# Patient Record
Sex: FEMALE | Race: WHITE | HISPANIC OR LATINO | Employment: OTHER | URBAN - METROPOLITAN AREA
[De-identification: names, ages, dates, MRNs, and addresses within clinical notes are randomized per-mention and may not be internally consistent; named-entity substitution may affect disease eponyms.]

---

## 2017-11-03 ENCOUNTER — TRANSFERRED RECORDS (OUTPATIENT)
Dept: HEALTH INFORMATION MANAGEMENT | Facility: CLINIC | Age: 72
End: 2017-11-03

## 2017-11-07 ENCOUNTER — TRANSFERRED RECORDS (OUTPATIENT)
Dept: HEALTH INFORMATION MANAGEMENT | Facility: CLINIC | Age: 72
End: 2017-11-07

## 2017-11-30 ENCOUNTER — TRANSFERRED RECORDS (OUTPATIENT)
Dept: HEALTH INFORMATION MANAGEMENT | Facility: CLINIC | Age: 72
End: 2017-11-30

## 2017-12-18 ENCOUNTER — TRANSFERRED RECORDS (OUTPATIENT)
Dept: HEALTH INFORMATION MANAGEMENT | Facility: CLINIC | Age: 72
End: 2017-12-18

## 2017-12-18 LAB — PHQ9 SCORE: 1

## 2017-12-21 ENCOUNTER — TRANSFERRED RECORDS (OUTPATIENT)
Dept: HEALTH INFORMATION MANAGEMENT | Facility: CLINIC | Age: 72
End: 2017-12-21

## 2018-02-08 ENCOUNTER — TRANSFERRED RECORDS (OUTPATIENT)
Dept: HEALTH INFORMATION MANAGEMENT | Facility: CLINIC | Age: 73
End: 2018-02-08

## 2018-03-12 ENCOUNTER — TRANSFERRED RECORDS (OUTPATIENT)
Dept: HEALTH INFORMATION MANAGEMENT | Facility: CLINIC | Age: 73
End: 2018-03-12

## 2018-04-03 LAB
ALT SERPL-CCNC: 10.85 U/L (ref 0–40)
AST SERPL-CCNC: 13.66 U/L (ref 0–45)
CREAT SERPL-MCNC: 0.83 MG/DL (ref 0.5–1.4)
GFR SERPL CREATININE-BSD FRML MDRD: >60 ML/MIN/1.73
GLUCOSE SERPL-MCNC: 108 MG/DL (ref 70–115)
POTASSIUM SERPL-SCNC: 4.75 MMOL/L (ref 3.5–5.3)

## 2018-08-01 PROCEDURE — 00000346 ZZHCL STATISTIC REVIEW OUTSIDE SLIDES TC 88321: Performed by: INTERNAL MEDICINE

## 2018-08-02 ENCOUNTER — CARE COORDINATION (OUTPATIENT)
Dept: ONCOLOGY | Facility: CLINIC | Age: 73
End: 2018-08-02
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
## 2018-08-02 NOTE — PROGRESS NOTES
Contacted patient, spoke with sister, who she lives with regarding her consult for tomorrow with Dr. Bee.  Patient, Katarzyna received her chemotherapy in [place] but was diagnosed through [facility].  She is going to hand carry her records from [place] and writer confirmed that they would be in English.  Per sister, the doctor in [place] is referring her to radiation for post-lumpectomy treatment.  Radiation oncology was messaged but they would not be able to see her tomorrow after appointment with Dr Bee - would need to schedule separate day as it would be too late in the day to get planning for radiation done.

## 2018-08-03 ENCOUNTER — ONCOLOGY VISIT (OUTPATIENT)
Dept: ONCOLOGY | Facility: CLINIC | Age: 73
End: 2018-08-03
Payer: COMMERCIAL

## 2018-08-03 VITALS
BODY MASS INDEX: 20.24 KG/M2 | DIASTOLIC BLOOD PRESSURE: 80 MMHG | RESPIRATION RATE: 16 BRPM | WEIGHT: 121.5 LBS | TEMPERATURE: 98 F | OXYGEN SATURATION: 97 % | HEIGHT: 65 IN | HEART RATE: 77 BPM | SYSTOLIC BLOOD PRESSURE: 140 MMHG

## 2018-08-03 DIAGNOSIS — M81.8 OTHER OSTEOPOROSIS WITHOUT CURRENT PATHOLOGICAL FRACTURE: ICD-10-CM

## 2018-08-03 DIAGNOSIS — C50.912 INVASIVE DUCTAL CARCINOMA OF LEFT BREAST (H): Primary | ICD-10-CM

## 2018-08-03 DIAGNOSIS — Z79.811 AROMATASE INHIBITOR USE: ICD-10-CM

## 2018-08-03 DIAGNOSIS — Z78.0 POSTMENOPAUSAL STATUS: ICD-10-CM

## 2018-08-03 LAB
ALBUMIN SERPL-MCNC: 3.9 G/DL (ref 3.4–5)
ALP SERPL-CCNC: 92 U/L (ref 40–150)
ALT SERPL W P-5'-P-CCNC: 24 U/L (ref 0–50)
ANION GAP SERPL CALCULATED.3IONS-SCNC: 6 MMOL/L (ref 3–14)
AST SERPL W P-5'-P-CCNC: 18 U/L (ref 0–45)
BASOPHILS # BLD AUTO: 0 10E9/L (ref 0–0.2)
BASOPHILS NFR BLD AUTO: 0.4 %
BILIRUB SERPL-MCNC: 0.5 MG/DL (ref 0.2–1.3)
BUN SERPL-MCNC: 12 MG/DL (ref 7–30)
CALCIUM SERPL-MCNC: 9.6 MG/DL (ref 8.5–10.1)
CHLORIDE SERPL-SCNC: 109 MMOL/L (ref 94–109)
CO2 SERPL-SCNC: 28 MMOL/L (ref 20–32)
CREAT SERPL-MCNC: 0.77 MG/DL (ref 0.52–1.04)
DIFFERENTIAL METHOD BLD: NORMAL
EOSINOPHIL # BLD AUTO: 0.1 10E9/L (ref 0–0.7)
EOSINOPHIL NFR BLD AUTO: 1.4 %
ERYTHROCYTE [DISTWIDTH] IN BLOOD BY AUTOMATED COUNT: 12.8 % (ref 10–15)
GFR SERPL CREATININE-BSD FRML MDRD: 73 ML/MIN/1.7M2
GLUCOSE SERPL-MCNC: 102 MG/DL (ref 70–99)
HCT VFR BLD AUTO: 38.2 % (ref 35–47)
HGB BLD-MCNC: 12.7 G/DL (ref 11.7–15.7)
IMM GRANULOCYTES # BLD: 0 10E9/L (ref 0–0.4)
IMM GRANULOCYTES NFR BLD: 0.4 %
LYMPHOCYTES # BLD AUTO: 0.8 10E9/L (ref 0.8–5.3)
LYMPHOCYTES NFR BLD AUTO: 11.7 %
MCH RBC QN AUTO: 30.8 PG (ref 26.5–33)
MCHC RBC AUTO-ENTMCNC: 33.2 G/DL (ref 31.5–36.5)
MCV RBC AUTO: 93 FL (ref 78–100)
MONOCYTES # BLD AUTO: 0.7 10E9/L (ref 0–1.3)
MONOCYTES NFR BLD AUTO: 10.2 %
NEUTROPHILS # BLD AUTO: 5.5 10E9/L (ref 1.6–8.3)
NEUTROPHILS NFR BLD AUTO: 75.9 %
PLATELET # BLD AUTO: 183 10E9/L (ref 150–450)
POTASSIUM SERPL-SCNC: 3.9 MMOL/L (ref 3.4–5.3)
PROT SERPL-MCNC: 7.3 G/DL (ref 6.8–8.8)
RBC # BLD AUTO: 4.12 10E12/L (ref 3.8–5.2)
SODIUM SERPL-SCNC: 143 MMOL/L (ref 133–144)
WBC # BLD AUTO: 7.2 10E9/L (ref 4–11)

## 2018-08-03 PROCEDURE — 80053 COMPREHEN METABOLIC PANEL: CPT | Performed by: INTERNAL MEDICINE

## 2018-08-03 PROCEDURE — 99205 OFFICE O/P NEW HI 60 MIN: CPT | Performed by: INTERNAL MEDICINE

## 2018-08-03 PROCEDURE — 85025 COMPLETE CBC W/AUTO DIFF WBC: CPT | Performed by: INTERNAL MEDICINE

## 2018-08-03 PROCEDURE — 36415 COLL VENOUS BLD VENIPUNCTURE: CPT | Performed by: INTERNAL MEDICINE

## 2018-08-03 RX ORDER — EPINEPHRINE 0.3 MG/.3ML
0.3 INJECTION SUBCUTANEOUS EVERY 5 MIN PRN
Status: CANCELLED | OUTPATIENT
Start: 2018-08-10

## 2018-08-03 RX ORDER — ALBUTEROL SULFATE 0.83 MG/ML
2.5 SOLUTION RESPIRATORY (INHALATION)
Status: CANCELLED | OUTPATIENT
Start: 2018-08-10

## 2018-08-03 RX ORDER — MEPERIDINE HYDROCHLORIDE 25 MG/ML
25 INJECTION INTRAMUSCULAR; INTRAVENOUS; SUBCUTANEOUS EVERY 30 MIN PRN
Status: CANCELLED | OUTPATIENT
Start: 2018-08-10

## 2018-08-03 RX ORDER — DIPHENHYDRAMINE HYDROCHLORIDE 50 MG/ML
50 INJECTION INTRAMUSCULAR; INTRAVENOUS
Status: CANCELLED
Start: 2018-08-10

## 2018-08-03 RX ORDER — ALBUTEROL SULFATE 90 UG/1
1-2 AEROSOL, METERED RESPIRATORY (INHALATION)
Status: CANCELLED
Start: 2018-08-10

## 2018-08-03 RX ORDER — METHYLPREDNISOLONE SODIUM SUCCINATE 125 MG/2ML
125 INJECTION, POWDER, LYOPHILIZED, FOR SOLUTION INTRAMUSCULAR; INTRAVENOUS
Status: CANCELLED
Start: 2018-08-10

## 2018-08-03 RX ORDER — SODIUM CHLORIDE 9 MG/ML
1000 INJECTION, SOLUTION INTRAVENOUS CONTINUOUS PRN
Status: CANCELLED
Start: 2018-08-10

## 2018-08-03 RX ORDER — FLUOXETINE 10 MG/1
10 CAPSULE ORAL DAILY
COMMUNITY
Start: 2018-02-16 | End: 2020-03-26

## 2018-08-03 RX ORDER — ANASTROZOLE 1 MG/1
1 TABLET ORAL DAILY
Qty: 90 TABLET | Refills: 3 | Status: SHIPPED | OUTPATIENT
Start: 2018-08-03 | End: 2019-02-15

## 2018-08-03 RX ORDER — EPINEPHRINE 1 MG/ML
0.3 INJECTION, SOLUTION INTRAMUSCULAR; SUBCUTANEOUS EVERY 5 MIN PRN
Status: CANCELLED | OUTPATIENT
Start: 2018-08-10

## 2018-08-03 ASSESSMENT — PAIN SCALES - GENERAL: PAINLEVEL: NO PAIN (0)

## 2018-08-03 NOTE — MR AVS SNAPSHOT
After Visit Summary   8/3/2018    Radha Patel    MRN: [id number]           Patient Information     Date Of Birth          1945        Visit Information        Provider Department      8/3/2018 1:00 PM Chastity Bee MD [facility]        Today's Diagnoses     Invasive ductal carcinoma of left breast (H)    -  1    Other osteoporosis without current pathological fracture        Aromatase inhibitor use        Postmenopausal status           Follow-ups after your visit        Your next 10 appointments already scheduled     Aug 10, 2018 12:30 PM CDT   Level O with [address])    [address] [place] [id number] [phone]            Aug 10, 2018  1:00 PM CDT   New Visit with Melvin Jones MD   [facility])    [address] [place] [id number] [phone]            Aug 10, 2018  2:30 PM CDT   Ct Sim with Melvin Jones MD, MG RT SIMULATION   [facility])    [address] [place] [id number] [phone]            Oct 05, 2018  3:30 PM CDT   Return Visit with Chastity Bee MD   [facility])    [address] [place] [id number]   [phone]              Who to contact     If you have questions or need follow up information about today's clinic visit or your schedule please contact [facility] directly at [phone].  Normal or non-critical lab and imaging results will be communicated to you by MyChart, letter or phone within 4 business days after the clinic has received the results. If you do not hear from us within 7 days, please contact the clinic through MyChart or phone. If you have a critical or abnormal lab result, we will notify you by phone as soon as possible.  Submit  "refill requests through [employer] or call your pharmacy and they will forward the refill request to us. Please allow 3 business days for your refill to be completed.          Additional Information About Your Visit        [employer] Information     [employer] is an electronic gateway that provides easy, online access to your medical records. With [employer], you can request a clinic appointment, read your test results, renew a prescription or communicate with your care team.     To sign up for [employer] visit the website at [email or web address]   You will be asked to enter the access code listed below, as well as some personal information. Please follow the directions to create your username and password.     Your access code is: [email or web address]  Expires: 2018 12:32 PM     Your access code will  in 90 days. If you need help or a new code, please contact your [facility] or call [phone] for assistance.        Care EveryWhere ID     This is your Care EveryWhere ID. This could be used by other organizations to access your [facility] medical records  [id number]        Your Vitals Were     Pulse Temperature Respirations Height Pulse Oximetry BMI (Body Mass Index)    77 98  F (36.7  C) 16 1.655 m (5' 5.16\") 97% 20.12 kg/m2       Blood Pressure from Last 3 Encounters:   18 140/80   12 148/61    Weight from Last 3 Encounters:   18 55.1 kg (121 lb 8 oz)   12 53.1 kg (117 lb 1 oz)              We Performed the Following     CBC with platelets differential     Comprehensive metabolic panel          Today's Medication Changes          These changes are accurate as of 8/3/18  3:17 PM.  If you have any questions, ask your nurse or doctor.               Start taking these medicines.        Dose/Directions    anastrozole 1 MG tablet   Commonly known as:  ARIMIDEX   Used for:  Invasive ductal carcinoma of left breast (H), Other osteoporosis without current pathological " fracture, Aromatase inhibitor use   Started by:  Chastity Bee MD        Dose:  1 mg   Take 1 tablet (1 mg) by mouth daily   Quantity:  90 tablet   Refills:  3            Where to get your medicines      These medications were sent to [id number] - [place] - [address] AT [facility] [address] [place] [address]     Phone:  [phone]     anastrozole 1 MG tablet                Primary Care Provider Office Phone # Fax #    [facility] [phone] [address] [facility] [address]        Equal Access to Services     : Hadii zina morocho hadasho Sociaraali, waaxda luqadaha, qaybta kaalmada adeegyada, waxcintia moody . So [facility] [phone].    ATENCIÓN: Si habla español, tiene a yoon disposición servicios gratuitos de asistencia lingüística. [facility] [phone].    We comply with applicable federal civil rights laws and [place] laws. We do not discriminate on the basis of race, color, national origin, age, disability, sex, sexual orientation, or gender identity.            Thank you!     Thank you for choosing [facility]  for your care. Our goal is always to provide you with excellent care. Hearing back from our patients is one way we can continue to improve our services. Please take a few minutes to complete the written survey that you may receive in the mail after your visit with us. Thank you!             Your Updated Medication List - Protect others around you: Learn how to safely use, store and throw away your medicines at www.disposemymeds.org.          This list is accurate as of 8/3/18  3:17 PM.  Always use your most recent med list.                   Brand Name Dispense Instructions for use Diagnosis    anastrozole 1 MG tablet    ARIMIDEX    90 tablet    Take 1 tablet (1 mg) by mouth daily    Invasive ductal carcinoma of left breast (H),  Other osteoporosis without current pathological fracture, Aromatase inhibitor use       ASPIRIN PO      Take 81 mg by mouth daily    Invasive ductal carcinoma of left breast (H), Other osteoporosis without current pathological fracture, Aromatase inhibitor use       ATENOLOL PO      Take 12.5 mg by mouth daily.        FLUoxetine 10 MG capsule    PROzac     Take 10 mg by mouth daily    Invasive ductal carcinoma of left breast (H), Other osteoporosis without current pathological fracture, Aromatase inhibitor use       hydrochlorothiazide 12.5 MG capsule    MICROZIDE     Take 25 mg by mouth daily.        HYDROcodone-acetaminophen 5-325 MG per tablet    NORCO    30 tablet    Take 1 tablet by mouth every 6 hours as needed for pain.    Caries       ibuprofen 600 MG tablet    IBU    30 tablet    Take 1 tablet by mouth every 6 hours as needed for pain.    Caries       LIPITOR PO      Take 40 mg by mouth daily.        LISINOPRIL PO      Take 20 mg by mouth daily.        METFORMIN HCL PO      Take 750 mg by mouth daily (with breakfast).        PLAVIX PO      Take 75 mg by mouth daily.
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
## 2018-08-03 NOTE — NURSING NOTE
"Oncology Rooming Note    August 3, 2018 1:11 PM   Katarzyna Patel is a 72 year old female who presents for:    Chief Complaint   Patient presents with     Oncology Clinic Visit     new breast ca - transfer of care      Initial Vitals: /72  Pulse 77  Temp 98  F (36.7  C)  Resp 16  Ht 1.655 m (5' 5.16\")  Wt 55.1 kg (121 lb 8 oz)  SpO2 97%  BMI 20.12 kg/m2 Estimated body mass index is 20.12 kg/(m^2) as calculated from the following:    Height as of this encounter: 1.655 m (5' 5.16\").    Weight as of this encounter: 55.1 kg (121 lb 8 oz). Body surface area is 1.59 meters squared.  No Pain (0) Comment: Data Unavailable   No LMP recorded. Patient has had a hysterectomy.  Allergies reviewed: Yes  Medications reviewed: Yes    Medications: MEDICATION REFILLS NEEDED TODAY. Provider was notified.  Pharmacy name entered into Stio:    [facility] - [place], [place] - [address] [facility] [address] [place] [address] [address] [facility]  [address]        5 minutes for nursing intake (face to face time)     Elizabeth Madden LPN              "
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
## 2018-08-10 ENCOUNTER — OFFICE VISIT (OUTPATIENT)
Dept: RADIATION ONCOLOGY | Facility: CLINIC | Age: 73
End: 2018-08-10
Payer: COMMERCIAL

## 2018-08-10 ENCOUNTER — INFUSION THERAPY VISIT (OUTPATIENT)
Dept: INFUSION THERAPY | Facility: CLINIC | Age: 73
End: 2018-08-10
Payer: COMMERCIAL

## 2018-08-10 ENCOUNTER — APPOINTMENT (OUTPATIENT)
Dept: RADIATION ONCOLOGY | Facility: CLINIC | Age: 73
End: 2018-08-10
Payer: COMMERCIAL

## 2018-08-10 VITALS
WEIGHT: 120.8 LBS | TEMPERATURE: 97.7 F | OXYGEN SATURATION: 94 % | DIASTOLIC BLOOD PRESSURE: 62 MMHG | RESPIRATION RATE: 16 BRPM | SYSTOLIC BLOOD PRESSURE: 133 MMHG | HEART RATE: 59 BPM | BODY MASS INDEX: 20.01 KG/M2

## 2018-08-10 VITALS
TEMPERATURE: 98.1 F | BODY MASS INDEX: 20.12 KG/M2 | WEIGHT: 120.8 LBS | RESPIRATION RATE: 18 BRPM | HEART RATE: 55 BPM | SYSTOLIC BLOOD PRESSURE: 145 MMHG | DIASTOLIC BLOOD PRESSURE: 57 MMHG | OXYGEN SATURATION: 96 % | HEIGHT: 65 IN

## 2018-08-10 DIAGNOSIS — C50.912 INVASIVE DUCTAL CARCINOMA OF LEFT BREAST (H): Primary | ICD-10-CM

## 2018-08-10 DIAGNOSIS — Z79.811 AROMATASE INHIBITOR USE: ICD-10-CM

## 2018-08-10 DIAGNOSIS — C50.912 INVASIVE DUCTAL CARCINOMA OF LEFT BREAST (H): ICD-10-CM

## 2018-08-10 DIAGNOSIS — M81.8 OTHER OSTEOPOROSIS WITHOUT CURRENT PATHOLOGICAL FRACTURE: Primary | ICD-10-CM

## 2018-08-10 PROCEDURE — 77263 THER RADIOLOGY TX PLNG CPLX: CPT | Performed by: RADIOLOGY

## 2018-08-10 PROCEDURE — 77290 THER RAD SIMULAJ FIELD CPLX: CPT | Performed by: RADIOLOGY

## 2018-08-10 PROCEDURE — 96372 THER/PROPH/DIAG INJ SC/IM: CPT | Performed by: INTERNAL MEDICINE

## 2018-08-10 PROCEDURE — 99205 OFFICE O/P NEW HI 60 MIN: CPT | Mod: 25 | Performed by: RADIOLOGY

## 2018-08-10 PROCEDURE — 99207 ZZC NO CHARGE NURSE ONLY: CPT

## 2018-08-10 RX ORDER — METOPROLOL TARTRATE 25 MG/1
25 TABLET, FILM COATED ORAL DAILY
COMMUNITY

## 2018-08-10 ASSESSMENT — PAIN SCALES - GENERAL
PAINLEVEL: NO PAIN (0)
PAINLEVEL: NO PAIN (0)

## 2018-08-10 NOTE — NURSING NOTE
INITIAL PATIENT ASSESSMENT      Diagnosis: Breast cancer (left breast)    Prior radiation therapy: None    Prior chemotherapy:   Protocol: CMF x 6 cycles  Facility: Dr. Debra Cuevas [facility]  Dates: Completed 6/8/2018      Prior hormonal therapy: Patient currently taking Arimidex.    Pain Eval:  Denies    Psychosocial  Living arrangements: Lives at home with family in [place], patient works as a  at [employer] three days per week.  Fall Risk: independent   referral needs: Not needed    Advanced Directive: Yes - Location: patient's family has copy  Implantable Cardiac Device: No  Authorization To Share Protected Health Information: Patient and sister report completed Authorization to Discuss form when in clinic for consultation with Dr. Bee, not currently scanned into medical chart, will continue to watch for scanning, otherwise will have patient complete new form.      Onset of menopause: Patient reports history of JELENA/BSO  Abnormal vaginal bleeding/discharge: No  Are you pregnant? No    Review of Systems     Constitutional: Negative.    HENT: Positive for hearing loss. Negative for congestion, ear discharge, ear pain, nosebleeds, sinus pain, sore throat and tinnitus.         Patient reports hearing loss, bilateral hearing aides.   Eyes: Negative.    Respiratory: Negative.  Negative for stridor.    Cardiovascular: Negative.    Gastrointestinal: Negative.    Genitourinary: Negative.    Musculoskeletal: Negative.    Skin: Negative.    Neurological: Positive for tingling, speech change and focal weakness. Negative for dizziness, tremors, sensory change, seizures, loss of consciousness and headaches.        Patient and sister report speech aphasia related to history of stroke, patient also reports slight right sided weakness.  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   Endo/Heme/Allergies: Negative.    Psychiatric/Behavioral: Negative.         Nurse face-to-face time: Level 5:  over 15 min face to face time

## 2018-08-10 NOTE — MR AVS SNAPSHOT
After Visit Summary   8/10/2018    Radha Patel    MRN: [id number]           Patient Information     Date Of Birth          1945        Visit Information        Provider Department      8/10/2018 1:00 PM Melvin Jones MD [facility]        Today's Diagnoses     Invasive ductal carcinoma of left breast (H)    -  1      Care Instructions          What to expect at your Simulation visit:    You will meet with a Radiation Therapist and other team members who will be doing a planning session called a  simulation  with you. This process will determine your daily treatment.    ~ You will lie on a flat table and have a treatment planning CT scan.  It is important during the scan to hold very still and breathe normally.    ~ Your therapist may construct a body mold to help you hold still for your treatments.    ~ If you are having treatment to the head or neck area you will be fitted with a plastic mesh mask that fits very snugly over your face and neck.     ~ Your therapist will be taking some digital photos that will go in your treatment chart.      ~Your therapist will make marks on your skin and take measurements. Your therapist may ask you about making small tattoos (a permanent small dot) over these marks.  These marks are used to position you daily for your radiation therapy treatments. Please do not wash off any marks until all of your radiation therapy treatments are complete unless you are instructed to do so by your therapist.    ~ Once the simulation is completed it can take from 3 to 10 business days before you start radiation therapy treatments.    ~ You may meet with a nurse who will go over management of treatment side effects and self care during your treatments. The nurse will help to plan care with other departments and physicians if needed.      Preventive Care:    Colorectal Cancer Screening: During our visit today, we discussed that it is recommended you  receive colorectal cancer screening. Please call or make an appointment with your primary care provider to discuss this. You may also call the [facility] scheduling line ([phone]) to set up a colonoscopy appointment.    Please contact [facility] RN with questions or concerns following today's appointment: [phone].    Thank you!              Follow-ups after your visit        Your next 10 appointments already scheduled     Aug 10, 2018  2:30 PM CDT   Ct Sim with Melvin Jones MD, MG RT SIMULATION   [facility] ([facility])    [address] [place] [address] [phone]            Oct 05, 2018  3:30 PM CDT   Return Visit with Chastity Bee MD   [facility])    [address] [address] [facility] [address] [phone]              Who to contact     If you have questions or need follow up information about today's clinic visit or your schedule please contact [facility] directly at [phone].  Normal or non-critical lab and imaging results will be communicated to you by Chumbakhart, letter or phone within 4 business days after the clinic has received the results. If you do not hear from us within 7 days, please contact the clinic through Chumbakhart or phone. If you have a critical or abnormal lab result, we will notify you by phone as soon as possible.  Submit refill requests through Scan or call your pharmacy and they will forward the refill request to us. Please allow 3 business days for your refill to be completed.          Additional Information About Your Visit        Scan Information     Scan is an electronic gateway that provides easy, online access to your medical records. With Scan, you can request a clinic appointment, read your test results, renew a prescription or communicate with your care team.     To sign up for Scan visit the website at  "[email or web address]   You will be asked to enter the access code listed below, as well as some personal information. Please follow the directions to create your username and password.     Your access code is: [email or web address]  Expires: 2018 12:32 PM     Your access code will  in 90 days. If you need help or a new code, please contact your [facility] or call [phone] for assistance.        Care EveryWhere ID     This is your Care EveryWhere ID. This could be used by other organizations to access your [facility] medical records  [id number]        Your Vitals Were     Pulse Temperature Respirations Height Pulse Oximetry BMI (Body Mass Index)    55 98.1  F (36.7  C) (Oral) 18 5' 5.16\" 96% 20 kg/m2       Blood Pressure from Last 3 Encounters:   08/10/18 145/57   08/10/18 133/62   18 140/80    Weight from Last 3 Encounters:   08/10/18 120 lb 12.8 oz   08/10/18 120 lb 12.8 oz   18 121 lb 8 oz              We Performed the Following     Mammogram - HIM Scan        Primary Care Provider Office Phone # Fax #    [facility] [phone] [address] [facility] [address]        Equal Access to Services     CÉSAR GIBSON : Hadii zina ku hadasho Soeugene, waaxda luqadaha, qaybta kaalmada adeshaziada, rody gale. So [place] [phone].    ATENCIÓN: Si habla español, tiene a yoon disposición servicios gratuitos de asistencia lingüística. Tom al [phone].    We comply with applicable federal civil rights laws and [place] laws. We do not discriminate on the basis of race, color, national origin, age, disability, sex, sexual orientation, or gender identity.            Thank you!     Thank you for choosing [facility]  for your care. Our goal is always to provide you with excellent care. Hearing back from our patients is one way we can continue to improve our services. " Please take a few minutes to complete the written survey that you may receive in the mail after your visit with us. Thank you!             Your Updated Medication List - Protect others around you: Learn how to safely use, store and throw away your medicines at www.disposemymeds.org.          This list is accurate as of 8/10/18  2:13 PM.  Always use your most recent med list.                   Brand Name Dispense Instructions for use Diagnosis    anastrozole 1 MG tablet    ARIMIDEX    90 tablet    Take 1 tablet (1 mg) by mouth daily    Invasive ductal carcinoma of left breast (H), Other osteoporosis without current pathological fracture, Aromatase inhibitor use       ASPIRIN PO      Take 81 mg by mouth daily    Invasive ductal carcinoma of left breast (H), Other osteoporosis without current pathological fracture, Aromatase inhibitor use       CALCIUM PO      Take 1,000 mg by mouth daily        FLUoxetine 10 MG capsule    PROzac     Take 10 mg by mouth daily    Invasive ductal carcinoma of left breast (H), Other osteoporosis without current pathological fracture, Aromatase inhibitor use       LIPITOR PO      Take 40 mg by mouth daily.        LISINOPRIL PO      Take 20 mg by mouth daily.        metoprolol tartrate 25 MG tablet    LOPRESSOR     Take 25 mg by mouth daily        PLAVIX PO      Take 75 mg by mouth daily.        VITAMIN D-3 PO      Take 2,000 mg by mouth

## 2018-08-10 NOTE — MR AVS SNAPSHOT
After Visit Summary   8/10/2018    Radha Patel    MRN: [id number]           Patient Information     Date Of Birth          1945        Visit Information        Provider Department      8/10/2018 12:30 PM [address] [facility]        Today's Diagnoses     Other osteoporosis without current pathological fracture    -  1    Invasive ductal carcinoma of left breast (H)        Aromatase inhibitor use           Follow-ups after your visit        Your next 10 appointments already scheduled     Aug 10, 2018  1:00 PM CDT   New Visit with Melvin Jones MD   [facility])    [address] [place] [id number] [phone]            Aug 10, 2018  2:30 PM CDT   Ct Sim with Melvin Jones MD, MG RT SIMULATION   [facility])    [address] [place] [address] [phone]            Oct 05, 2018  3:30 PM CDT   Return Visit with Chastity Bee MD   [facility])    [address] [place] [address] [phone]              Who to contact     If you have questions or need follow up information about today's clinic visit or your schedule please contact [facility] directly at [phone].  Normal or non-critical lab and imaging results will be communicated to you by MyChart, letter or phone within 4 business days after the clinic has received the results. If you do not hear from us within 7 days, please contact the clinic through MyChart or phone. If you have a critical or abnormal lab result, we will notify you by phone as soon as possible.  Submit refill requests through MakerCraft or call your pharmacy and they will forward the refill request to us. Please allow 3 business days for your refill to be completed.          Additional Information About Your Visit        Scarossohart  Information     xkoto is an electronic gateway that provides easy, online access to your medical records. With xkoto, you can request a clinic appointment, read your test results, renew a prescription or communicate with your care team.     To sign up for xkoto visit the website at [email or web address]   You will be asked to enter the access code listed below, as well as some personal information. Please follow the directions to create your username and password.     Your access code is: [email or web address]  Expires: 2018 12:32 PM     Your access code will  in 90 days. If you need help or a new code, please contact your [facility] or call [phone] for assistance.        Care EveryWhere ID     This is your Care EveryWhere ID. This could be used by other organizations to access your [facility] medical records  [id number]        Your Vitals Were     Pulse Temperature Respirations Pulse Oximetry BMI (Body Mass Index)       59 97.7  F (36.5  C) (Oral) 16 94% 20.01 kg/m2        Blood Pressure from Last 3 Encounters:   08/10/18 133/62   18 140/80   12 148/61    Weight from Last 3 Encounters:   08/10/18 54.8 kg (120 lb 12.8 oz)   18 55.1 kg (121 lb 8 oz)   12 53.1 kg (117 lb 1 oz)              Today, you had the following     No orders found for display       Primary Care Provider Office Phone # Fax #    [facility] [phone] [address] [facility] [address]        Equal Access to Services     CÉSAR GIBSON : Hadcarolina Justin, yovana greenberg, qabibi barralrody rodriguez. So [facility] [phone].    ATENCIÓN: Si habla español, tiene a yoon disposición servicios gratuitos de asistencia lingüística. Llame al [phone].    We comply with applicable federal civil rights laws and [place] laws. We do not discriminate on the basis of race,  color, national origin, age, disability, sex, sexual orientation, or gender identity.            Thank you!     Thank you for choosing [facility]  for your care. Our goal is always to provide you with excellent care. Hearing back from our patients is one way we can continue to improve our services. Please take a few minutes to complete the written survey that you may receive in the mail after your visit with us. Thank you!             Your Updated Medication List - Protect others around you: Learn how to safely use, store and throw away your medicines at www.disposemymeds.org.          This list is accurate as of 8/10/18 12:36 PM.  Always use your most recent med list.                   Brand Name Dispense Instructions for use Diagnosis    anastrozole 1 MG tablet    ARIMIDEX    90 tablet    Take 1 tablet (1 mg) by mouth daily    Invasive ductal carcinoma of left breast (H), Other osteoporosis without current pathological fracture, Aromatase inhibitor use       ASPIRIN PO      Take 81 mg by mouth daily    Invasive ductal carcinoma of left breast (H), Other osteoporosis without current pathological fracture, Aromatase inhibitor use       ATENOLOL PO      Take 12.5 mg by mouth daily.        FLUoxetine 10 MG capsule    PROzac     Take 10 mg by mouth daily    Invasive ductal carcinoma of left breast (H), Other osteoporosis without current pathological fracture, Aromatase inhibitor use       hydrochlorothiazide 12.5 MG capsule    MICROZIDE     Take 25 mg by mouth daily.        HYDROcodone-acetaminophen 5-325 MG per tablet    NORCO    30 tablet    Take 1 tablet by mouth every 6 hours as needed for pain.    Caries       ibuprofen 600 MG tablet    IBU    30 tablet    Take 1 tablet by mouth every 6 hours as needed for pain.    Caries       LIPITOR PO      Take 40 mg by mouth daily.        LISINOPRIL PO      Take 20 mg by mouth daily.        METFORMIN HCL PO      Take 750 mg by mouth daily (with breakfast).         PLAVIX PO      Take 75 mg by mouth daily.
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
## 2018-08-10 NOTE — PATIENT INSTRUCTIONS
What to expect at your Simulation visit:    You will meet with a Radiation Therapist and other team members who will be doing a planning session called a  simulation  with you. This process will determine your daily treatment.    ~ You will lie on a flat table and have a treatment planning CT scan.  It is important during the scan to hold very still and breathe normally.    ~ Your therapist may construct a body mold to help you hold still for your treatments.    ~ If you are having treatment to the head or neck area you will be fitted with a plastic mesh mask that fits very snugly over your face and neck.     ~ Your therapist will be taking some digital photos that will go in your treatment chart.      ~Your therapist will make marks on your skin and take measurements. Your therapist may ask you about making small tattoos (a permanent small dot) over these marks.  These marks are used to position you daily for your radiation therapy treatments. Please do not wash off any marks until all of your radiation therapy treatments are complete unless you are instructed to do so by your therapist.    ~ Once the simulation is completed it can take from 3 to 10 business days before you start radiation therapy treatments.    ~ You may meet with a nurse who will go over management of treatment side effects and self care during your treatments. The nurse will help to plan care with other departments and physicians if needed.      Preventive Care:    Colorectal Cancer Screening: During our visit today, we discussed that it is recommended you receive colorectal cancer screening. Please call or make an appointment with your primary care provider to discuss this. You may also call the [facility] scheduling line ([phone]) to set up a colonoscopy appointment.    Please contact [facility] RN with questions or concerns following today's appointment: [phone].    Thank you!

## 2018-08-10 NOTE — PROGRESS NOTES
Infusion Nursing Note:  Rahda Patel presents today for Prolia injection.    Patient seen by provider today: No   present during visit today: Not Applicable.    Note: N/A.    Intravenous Access:  No Intravenous access/labs at this visit.    Treatment Conditions:  Not Applicable.      Post Infusion Assessment:  Patient tolerated injection without incident.    Discharge Plan:   Patient discharged in stable condition accompanied by: sister.  Departure Mode: Ambulatory.  Face to Face time: 10.    Radha Treviño RN

## 2018-08-13 ENCOUNTER — PRENATAL OFFICE VISIT (OUTPATIENT)
Dept: RADIATION ONCOLOGY | Facility: CLINIC | Age: 73
End: 2018-08-13
Payer: COMMERCIAL

## 2018-08-13 DIAGNOSIS — Z53.9 ERRONEOUS ENCOUNTER--DISREGARD: Primary | ICD-10-CM

## 2018-08-13 NOTE — MR AVS SNAPSHOT
After Visit Summary   8/13/2018    Radha Patel    MRN: [id number]           Patient Information     Date Of Birth          1945        Visit Information        Provider Department      8/13/2018 8:44 AM Melvin Jones MD [facility]        Today's Diagnoses     ERRONEOUS ENCOUNTER--DISREGARD    -  1       Follow-ups after your visit        Your next 10 appointments already scheduled     Aug 17, 2018 11:00 AM CDT   TREATMENT with RADIATION THERAPIST   [facility] ([facility])    [address] [facility] [address]   [phone]            Aug 20, 2018 11:00 AM CDT   TREATMENT with RADIATION THERAPIST   [facility] ([facility])    [address] [facility] [address]   [phone]            Aug 20, 2018 11:30 AM CDT   on treatment visit with Melvin Jones MD   [facility] ([facility])    [address] [facility] [address]   [phone]            Aug 21, 2018 11:00 AM CDT   TREATMENT with RADIATION THERAPIST   [facility] ([facility])    [address] [facility] [address]   [phone]            Aug 22, 2018 11:00 AM CDT   TREATMENT with RADIATION THERAPIST   [facility] ([facility])    [address] [facility] [address]   [phone]            Aug 24, 2018 11:00 AM CDT   TREATMENT with RADIATION THERAPIST   [facility] ([facility])    [address] [facility] [address]   [phone]            Aug 27, 2018 10:30 AM CDT   TREATMENT with RADIATION THERAPIST   [facility] ([facility])    [address] [facility] [address]   [phone]            Aug 27, 2018 11:00 AM CDT   on treatment visit with Melvin Jones MD   [facility] ([facility])    [address] [place] [address]   [phone]            Aug 28, 2018 11:00 AM CDT   TREATMENT with RADIATION THERAPIST   [facility] ([facility])    [address] [address]   [phone]            Aug 29, 2018 11:00 AM CDT   TREATMENT with RADIATION THERAPIST   [facility] ([facility])    [address] [place] [address] [phone]              Who to contact     If you have questions or need follow up information about today's clinic visit or your schedule please contact [facility] directly at [phone].  Normal or non-critical lab and imaging results will be communicated to you by TopDown Conservationhart, letter or phone within 4 business days after the clinic has received the results. If you do not hear from us within 7 days, please contact the clinic through MyChart or phone. If you have a critical or abnormal lab result, we will notify you by phone as soon as possible.  Submit refill requests through Tehnologii obratnyh zadach or call your pharmacy and they will forward the refill request to us. Please allow 3 business days for your refill to be completed.          Additional Information About Your Visit        Te[employer]ologii obratnyh [employer]adach Information     Te[employer] obratny[employer] zadach is an electronic gateway that provides easy, online access to your medical records. With Tehnologii obratnyh zadach, you can request a clinic appointment, read your test results, renew a prescription or communicate with your care team.     To sign up for Tehnologii obratnyh zadach visit the website at [email or web address]   You will be asked to enter the access code listed below, as well as some personal information. Please follow the directions to create your username and password.     Your access code is: [email or web address]  Expires: 2018 12:32 PM     Your access code will  in 90 days. If you need help or a new code, please contact your  [facility] or call [phone] for assistance.        Care EveryWhere ID     This is your Care EveryWhere ID. This could be used by other organizations to access your [place] medical records  [id number]         Blood Pressure from Last 3 Encounters:   08/10/18 145/57   08/10/18 133/62   08/03/18 140/80    Weight from Last 3 Encounters:   08/10/18 120 lb 12.8 oz   08/10/18 120 lb 12.8 oz   08/03/18 121 lb 8 oz              Today, you had the following     No orders found for display       Primary Care Provider Office Phone # Fax #    [facility] [phone] [address] [facility] [address]        Equal Access to Services     CÉSAR GIBSON : Hadcarolina hollingswortho Soeugene, waaxda luqadaha, qaybta kaalmada adeegyada, rody gale. So [facility] [phone].    ATENCIÓN: Si habla español, tiene a yoon disposición servicios gratuitos de asistencia lingüística. [facility] [phone].    We comply with applicable federal civil rights laws and [place] laws. We do not discriminate on the basis of race, color, national origin, age, disability, sex, sexual orientation, or gender identity.            Thank you!     Thank you for choosing [facility]  for your care. Our goal is always to provide you with excellent care. Hearing back from our patients is one way we can continue to improve our services. Please take a few minutes to complete the written survey that you may receive in the mail after your visit with us. Thank you!             Your Updated Medication List - Protect others around you: Learn how to safely use, store and throw away your medicines at www.disposemymeds.org.          This list is accurate as of 8/13/18 11:59 PM.  Always use your most recent med list.                   Brand Name Dispense Instructions for use Diagnosis    anastrozole 1 MG tablet    ARIMIDEX    90 tablet     Take 1 tablet (1 mg) by mouth daily    Invasive ductal carcinoma of left breast (H), Other osteoporosis without current pathological fracture, Aromatase inhibitor use       ASPIRIN PO      Take 81 mg by mouth daily    Invasive ductal carcinoma of left breast (H), Other osteoporosis without current pathological fracture, Aromatase inhibitor use       CALCIUM PO      Take 1,000 mg by mouth daily        FLUoxetine 10 MG capsule    PROzac     Take 10 mg by mouth daily    Invasive ductal carcinoma of left breast (H), Other osteoporosis without current pathological fracture, Aromatase inhibitor use       LIPITOR PO      Take 40 mg by mouth daily.        LISINOPRIL PO      Take 20 mg by mouth daily.        metoprolol tartrate 25 MG tablet    LOPRESSOR     Take 25 mg by mouth daily        PLAVIX PO      Take 75 mg by mouth daily.        VITAMIN D-3 PO      Take 2,000 mg by mouth

## 2018-08-16 ENCOUNTER — APPOINTMENT (OUTPATIENT)
Dept: RADIATION ONCOLOGY | Facility: CLINIC | Age: 73
End: 2018-08-16
Payer: COMMERCIAL

## 2018-08-16 PROCEDURE — 77300 RADIATION THERAPY DOSE PLAN: CPT | Performed by: RADIOLOGY

## 2018-08-16 PROCEDURE — 77293 RESPIRATOR MOTION MGMT SIMUL: CPT | Performed by: RADIOLOGY

## 2018-08-16 PROCEDURE — 77295 3-D RADIOTHERAPY PLAN: CPT | Performed by: RADIOLOGY

## 2018-08-16 PROCEDURE — 77334 RADIATION TREATMENT AID(S): CPT | Performed by: RADIOLOGY

## 2018-08-17 ENCOUNTER — APPOINTMENT (OUTPATIENT)
Dept: RADIATION ONCOLOGY | Facility: CLINIC | Age: 73
End: 2018-08-17
Payer: COMMERCIAL

## 2018-08-17 PROCEDURE — 77280 THER RAD SIMULAJ FIELD SMPL: CPT | Performed by: RADIOLOGY

## 2018-08-20 ENCOUNTER — OFFICE VISIT (OUTPATIENT)
Dept: RADIATION ONCOLOGY | Facility: CLINIC | Age: 73
End: 2018-08-20
Payer: COMMERCIAL

## 2018-08-20 ENCOUNTER — APPOINTMENT (OUTPATIENT)
Dept: RADIATION ONCOLOGY | Facility: CLINIC | Age: 73
End: 2018-08-20
Payer: COMMERCIAL

## 2018-08-20 VITALS
HEART RATE: 48 BPM | BODY MASS INDEX: 20.2 KG/M2 | WEIGHT: 122 LBS | DIASTOLIC BLOOD PRESSURE: 60 MMHG | SYSTOLIC BLOOD PRESSURE: 137 MMHG | OXYGEN SATURATION: 96 %

## 2018-08-20 DIAGNOSIS — C50.912 INVASIVE DUCTAL CARCINOMA OF LEFT BREAST (H): Primary | ICD-10-CM

## 2018-08-20 PROCEDURE — G6017 INTRAFRACTION TRACK MOTION: HCPCS | Performed by: RADIOLOGY

## 2018-08-20 PROCEDURE — 99207 ZZC DROP WITH A PROCEDURE: CPT | Performed by: RADIOLOGY

## 2018-08-20 PROCEDURE — G6013 RADIATION TREATMENT DELIVERY: HCPCS | Performed by: RADIOLOGY

## 2018-08-20 ASSESSMENT — PAIN SCALES - GENERAL: PAINLEVEL: NO PAIN (0)

## 2018-08-20 NOTE — PROGRESS NOTES
[facility] PHYSICIANS  SPECIALIZING IN BREAKTHROUGHS  Radiation Oncology    On Treatment Visit Note      Radha Patel      Date: 2018   MRN: [id number]   : 1945         Reason for Visit:  On Radiation Treatment Visit     Treatment Summary to Date   L breast and regional LN  200/5000 cGy + boost   fx          Subjective:   Began RT today; tolerated well, no complaints.    Objective:   /60 (BP Location: Right arm, Patient Position: Right side, Cuff Size: Adult Regular)  Pulse (!) 48  Wt 122 lb  SpO2 96%  BMI 20.2 kg/m2  NAD  No skin changes    Labs:  CBC RESULTS:   Recent Labs   Lab Test  18   1409   WBC  7.2   RBC  4.12   HGB  12.7   HCT  38.2   MCV  93   MCH  30.8   MCHC  33.2   RDW  12.8   PLT  183     ELECTROLYTES:  Recent Labs   Lab Test  18   1409   NA  143   POTASSIUM  3.9   CHLORIDE  109   BERTIN  9.6   CO2  28   BUN  12   CR  0.77   GLC  102*       Assessment:    Tolerating radiation therapy well.  All questions and concerns addressed.    Plan:   1. Continue current therapy.  Discussed side fx and skin care.      [facility] chart and setup information reviewed  Ports checked                Melvin Jones MD

## 2018-08-20 NOTE — MR AVS SNAPSHOT
After Visit Summary   8/20/2018    Radha Patel    MRN: [id number]           Patient Information     Date Of Birth          1945        Visit Information        Provider Department      8/20/2018 11:30 AM Melvin Jones MD [facility]        Today's Diagnoses     Invasive ductal carcinoma of left breast (H)    -  1       Follow-ups after your visit        Your next 10 appointments already scheduled     Aug 21, 2018  8:15 AM CDT   TREATMENT with RADIATION THERAPIST   [facility] ([facility])    [address] [facility] [address]   [phone]            Aug 22, 2018  9:30 AM CDT   TREATMENT with RADIATION THERAPIST   [facility] ([facility])    [address] [facility] [address]   [phone]            Aug 24, 2018  9:30 AM CDT   TREATMENT with RADIATION THERAPIST   [facility] ([facility])    [address] [facility] [address]   [phone]            Aug 27, 2018  9:30 AM CDT   TREATMENT with RADIATION THERAPIST   [facility] ([facility])    [address] [facility] [address]   [phone]            Aug 27, 2018 10:00 AM CDT   on treatment visit with Melvin Jones MD   [facility] ([facility])    [address] [facility] [address]   [phone]            Aug 28, 2018  9:30 AM CDT   TREATMENT with RADIATION THERAPIST   [facility] ([facility])    [address] [facility] [address]   [phone]            Aug 29, 2018  9:30 AM CDT   TREATMENT with RADIATION THERAPIST   [facility] ([facility])    [address] [facility] [address]   [phone]            Aug 30, 2018  9:15 AM CDT   TREATMENT with RADIATION THERAPIST   [facility] ([facility])    [address] [place] [address]   [phone]            Aug 31, 2018 11:00 AM CDT   TREATMENT with RADIATION THERAPIST   [facility] ([facility])    [address] [facility] [address]   [phone]            Sep 04, 2018  7:30 AM CDT   TREATMENT with RADIATION THERAPIST   [facility] ([facility])    [address] [place] [address]   [phone]              Who to contact     If you have questions or need follow up information about today's clinic visit or your schedule please contact [facility] directly at [phone].  Normal or non-critical lab and imaging results will be communicated to you by MyChart, letter or phone within 4 business days after the clinic has received the results. If you do not hear from us within 7 days, please contact the clinic through MyChart or phone. If you have a critical or abnormal lab result, we will notify you by phone as soon as possible.  Submit refill requests through Lifetable or call your pharmacy and they will forward the refill request to us. Please allow 3 business days for your refill to be completed.          Additional Information About Your Visit        Lifetable Information     Lifetable is an electronic gateway that provides easy, online access to your medical records. With Lifetable, you can request a clinic appointment, read your test results, renew a prescription or communicate with your care team.     To sign up for Lifetable visit the website at [email or web address]   You will be asked to enter the access code listed below, as well as some personal information. Please follow the directions to create your username and password.     Your access code is: [email or web address]  Expires: 2018 12:32 PM     Your access code will  in 90 days. If you need help or a new code, please contact  your [facility] or call [phone] for assistance.        Care EveryWhere ID     This is your Care EveryWhere ID. This could be used by other organizations to access your [facility] medical records  [id number]        Your Vitals Were     Pulse Pulse Oximetry BMI (Body Mass Index)             48 96% 20.2 kg/m2          Blood Pressure from Last 3 Encounters:   08/20/18 137/60   08/10/18 145/57   08/10/18 133/62    Weight from Last 3 Encounters:   08/20/18 122 lb   08/10/18 120 lb 12.8 oz   08/10/18 120 lb 12.8 oz              Today, you had the following     No orders found for display       Primary Care Provider Office Phone # Fax #    [facility] [phone] [address] [facility] [address]        Equal Access to Services     CÉSAR GIBSON : Hadii zina hollingswortho Soeugene, waaxda luqadaha, qaybta kaalmada adeegyada, rody moody . So [facility] [phone].    ATENCIÓN: Si habla español, tiene a yoon disposición servicios gratuitos de asistencia lingüística. Tom al [phone].    We comply with applicable federal civil rights laws and [place] laws. We do not discriminate on the basis of race, color, national origin, age, disability, sex, sexual orientation, or gender identity.            Thank you!     Thank you for choosing [facility]  for your care. Our goal is always to provide you with excellent care. Hearing back from our patients is one way we can continue to improve our services. Please take a few minutes to complete the written survey that you may receive in the mail after your visit with us. Thank you!             Your Updated Medication List - Protect others around you: Learn how to safely use, store and throw away your medicines at www.disposemymeds.org.          This list is accurate as of 8/20/18 11:37 AM.  Always use your most recent med list.                   Brand  Name Dispense Instructions for use Diagnosis    anastrozole 1 MG tablet    ARIMIDEX    90 tablet    Take 1 tablet (1 mg) by mouth daily    Invasive ductal carcinoma of left breast (H), Other osteoporosis without current pathological fracture, Aromatase inhibitor use       ASPIRIN PO      Take 81 mg by mouth daily    Invasive ductal carcinoma of left breast (H), Other osteoporosis without current pathological fracture, Aromatase inhibitor use       CALCIUM PO      Take 1,000 mg by mouth daily        FLUoxetine 10 MG capsule    PROzac     Take 10 mg by mouth daily    Invasive ductal carcinoma of left breast (H), Other osteoporosis without current pathological fracture, Aromatase inhibitor use       LIPITOR PO      Take 40 mg by mouth daily.        LISINOPRIL PO      Take 20 mg by mouth daily.        metoprolol tartrate 25 MG tablet    LOPRESSOR     Take 25 mg by mouth daily        PLAVIX PO      Take 75 mg by mouth daily.        VITAMIN D-3 PO      Take 2,000 mg by mouth

## 2018-08-21 ENCOUNTER — APPOINTMENT (OUTPATIENT)
Dept: RADIATION ONCOLOGY | Facility: CLINIC | Age: 73
End: 2018-08-21
Payer: COMMERCIAL

## 2018-08-21 PROCEDURE — G6013 RADIATION TREATMENT DELIVERY: HCPCS | Performed by: RADIOLOGY

## 2018-08-21 PROCEDURE — G6017 INTRAFRACTION TRACK MOTION: HCPCS | Performed by: RADIOLOGY

## 2018-08-22 ENCOUNTER — APPOINTMENT (OUTPATIENT)
Dept: RADIATION ONCOLOGY | Facility: CLINIC | Age: 73
End: 2018-08-22
Payer: COMMERCIAL

## 2018-08-22 PROCEDURE — G6017 INTRAFRACTION TRACK MOTION: HCPCS | Performed by: RADIOLOGY

## 2018-08-22 PROCEDURE — G6013 RADIATION TREATMENT DELIVERY: HCPCS | Performed by: RADIOLOGY

## 2018-08-23 ENCOUNTER — APPOINTMENT (OUTPATIENT)
Dept: RADIATION ONCOLOGY | Facility: CLINIC | Age: 73
End: 2018-08-23
Payer: COMMERCIAL

## 2018-08-23 PROCEDURE — G6013 RADIATION TREATMENT DELIVERY: HCPCS | Performed by: RADIOLOGY

## 2018-08-23 PROCEDURE — G6017 INTRAFRACTION TRACK MOTION: HCPCS | Performed by: RADIOLOGY

## 2018-08-24 ENCOUNTER — APPOINTMENT (OUTPATIENT)
Dept: RADIATION ONCOLOGY | Facility: CLINIC | Age: 73
End: 2018-08-24
Payer: COMMERCIAL

## 2018-08-24 PROCEDURE — 77336 RADIATION PHYSICS CONSULT: CPT | Performed by: RADIOLOGY

## 2018-08-24 PROCEDURE — G6013 RADIATION TREATMENT DELIVERY: HCPCS | Performed by: RADIOLOGY

## 2018-08-24 PROCEDURE — G6017 INTRAFRACTION TRACK MOTION: HCPCS | Performed by: RADIOLOGY

## 2018-08-24 PROCEDURE — 77427 RADIATION TX MANAGEMENT X5: CPT | Performed by: RADIOLOGY

## 2018-08-24 PROCEDURE — 77417 THER RADIOLOGY PORT IMAGE(S): CPT | Performed by: RADIOLOGY

## 2018-08-27 ENCOUNTER — APPOINTMENT (OUTPATIENT)
Dept: RADIATION ONCOLOGY | Facility: CLINIC | Age: 73
End: 2018-08-27
Payer: COMMERCIAL

## 2018-08-27 ENCOUNTER — OFFICE VISIT (OUTPATIENT)
Dept: RADIATION ONCOLOGY | Facility: CLINIC | Age: 73
End: 2018-08-27
Payer: COMMERCIAL

## 2018-08-27 VITALS
WEIGHT: 120.5 LBS | OXYGEN SATURATION: 99 % | SYSTOLIC BLOOD PRESSURE: 158 MMHG | DIASTOLIC BLOOD PRESSURE: 78 MMHG | RESPIRATION RATE: 16 BRPM | BODY MASS INDEX: 19.95 KG/M2 | HEART RATE: 50 BPM

## 2018-08-27 DIAGNOSIS — C50.912 INVASIVE DUCTAL CARCINOMA OF LEFT BREAST (H): Primary | ICD-10-CM

## 2018-08-27 PROCEDURE — G6013 RADIATION TREATMENT DELIVERY: HCPCS | Performed by: RADIOLOGY

## 2018-08-27 PROCEDURE — 99207 ZZC DROP WITH A PROCEDURE: CPT | Performed by: RADIOLOGY

## 2018-08-27 PROCEDURE — G6017 INTRAFRACTION TRACK MOTION: HCPCS | Performed by: RADIOLOGY

## 2018-08-27 ASSESSMENT — PAIN SCALES - GENERAL: PAINLEVEL: NO PAIN (0)

## 2018-08-27 NOTE — MR AVS SNAPSHOT
After Visit Summary   8/27/2018    Radha Patel    MRN: [id number]           Patient Information     Date Of Birth          1945        Visit Information        Provider Department      8/27/2018 10:00 AM Melvin Jones MD [facility]        Today's Diagnoses     Invasive ductal carcinoma of left breast (H)    -  1      Care Instructions    Please contact [facility] RN with questions or concerns following today's appointment: [phone].    Thank you!            Follow-ups after your visit        Your next 10 appointments already scheduled     Aug 28, 2018  9:30 AM CDT   TREATMENT with RADIATION THERAPIST   [facility] ([facility])    [address] [facility] [address]   [phone]            Aug 29, 2018  9:30 AM CDT   TREATMENT with RADIATION THERAPIST   [facility] ([facility])    [address] [facility] [address]   [phone]            Aug 30, 2018  9:15 AM CDT   TREATMENT with RADIATION THERAPIST   [facility] ([facility])    [address] [facility] [address]   [phone]            Aug 31, 2018 11:00 AM CDT   TREATMENT with RADIATION THERAPIST   [facility] ([facility])    [address] [facility] [address]   [phone]            Sep 04, 2018  7:30 AM CDT   TREATMENT with RADIATION THERAPIST   [facility] ([facility])    [address] [facility] [address]   [phone]            Sep 04, 2018  8:00 AM CDT   on treatment visit with Melvin Joens MD   [facility] ([facility])    [address] [facility] [address]   [phone]            Sep 05, 2018 11:00 AM CDT   TREATMENT with RADIATION THERAPIST   [facility] ([facility])    [address] [place] [address]   [phone]            Sep 06, 2018 11:00 AM CDT   TREATMENT with RADIATION THERAPIST   [facility]    [address] [address]   [phone]            Sep 07, 2018 11:00 AM CDT   TREATMENT with RADIATION THERAPIST   [facility] ([facility])    [address] [place] [address]   [phone]            Sep 10, 2018 10:30 AM CDT   TREATMENT with RADIATION THERAPIST   [facility])    [address] [place] [address]   [phone]              Who to contact     If you have questions or need follow up information about today's clinic visit or your schedule please contact [facility] directly at [phone].  Normal or non-critical lab and imaging results will be communicated to you by Skyline Financialhart, letter or phone within 4 business days after the clinic has received the results. If you do not hear from us within 7 days, please contact the clinic through FanBreadt or phone. If you have a critical or abnormal lab result, we will notify you by phone as soon as possible.  Submit refill requests through Eachpal or call your pharmacy and they will forward the refill request to us. Please allow 3 business days for your refill to be completed.          Additional Information About Your Visit        Eachpal Information     Eachpal is an electronic gateway that provides easy, online access to your medical records. With Eachpal, you can request a clinic appointment, read your test results, renew a prescription or communicate with your care team.     To sign up for Eachpal visit the website at [email or web address]   You will be asked to enter the access code listed below, as well as some personal information. Please follow the directions to create your username and  password.     Your access code is: [email or web address]  Expires: 2018 12:32 PM     Your access code will  in 90 days. If you need help or a new code, please contact your [facility] or call [phone] for assistance.        Care EveryWhere ID     This is your Care EveryWhere ID. This could be used by other organizations to access your [place] medical records  [id number]        Your Vitals Were     Pulse Respirations Pulse Oximetry BMI (Body Mass Index)          50 16 99% 19.95 kg/m2         Blood Pressure from Last 3 Encounters:   18 158/78   18 137/60   08/10/18 145/57    Weight from Last 3 Encounters:   18 120 lb 8 oz   18 122 lb   08/10/18 120 lb 12.8 oz              Today, you had the following     No orders found for display       Primary Care Provider Office Phone # Fax #    [facility] [phone] [address] [facility] [address]        Equal Access to Services     CARLA [facility] Hospital[facility] : Hadii aad ku hadasho Soomaali, waaxda luqadaha, qaybta kaalmada adeegyathien, rody moody . So [facility] [phone].    ATENCIÓN: Si habla español, tiene a yoon disposición servicios gratuitos de asistencia lingüística. [facility] [phone].    We comply with applicable federal civil rights laws and [place] laws. We do not discriminate on the basis of race, color, national origin, age, disability, sex, sexual orientation, or gender identity.            Thank you!     Thank you for choosing [facility]  for your care. Our goal is always to provide you with excellent care. Hearing back from our patients is one way we can continue to improve our services. Please take a few minutes to complete the written survey that you may receive in the mail after your visit with us. Thank you!             Your Updated Medication List - Protect others around you: Learn how to safely use,  store and throw away your medicines at www.disposemymeds.org.          This list is accurate as of 8/27/18 10:26 AM.  Always use your most recent med list.                   Brand Name Dispense Instructions for use Diagnosis    anastrozole 1 MG tablet    ARIMIDEX    90 tablet    Take 1 tablet (1 mg) by mouth daily    Invasive ductal carcinoma of left breast (H), Other osteoporosis without current pathological fracture, Aromatase inhibitor use       ASPIRIN PO      Take 81 mg by mouth daily    Invasive ductal carcinoma of left breast (H), Other osteoporosis without current pathological fracture, Aromatase inhibitor use       CALCIUM PO      Take 1,000 mg by mouth daily        FLUoxetine 10 MG capsule    PROzac     Take 10 mg by mouth daily    Invasive ductal carcinoma of left breast (H), Other osteoporosis without current pathological fracture, Aromatase inhibitor use       LIPITOR PO      Take 40 mg by mouth daily.        LISINOPRIL PO      Take 20 mg by mouth daily.        metoprolol tartrate 25 MG tablet    LOPRESSOR     Take 25 mg by mouth daily        PLAVIX PO      Take 75 mg by mouth daily.        VITAMIN D-3 PO      Take 2,000 mg by mouth

## 2018-08-27 NOTE — PATIENT INSTRUCTIONS
Please contact [facility] RN with questions or concerns following today's appointment: [phone].    Thank you!

## 2018-08-27 NOTE — PROGRESS NOTES
[facility] PHYSICIANS  SPECIALIZING IN BREAKTHROUGHS  Radiation Oncology    On Treatment Visit Note  [facility] PHYSICIANS  SPECIALIZING IN BREAKTHROUGHS  Radiation Oncology    On Treatment Visit Note      Radha Patel      Date: 2018   MRN: [id number]   : 1945  Diagnosis: breast cancer      Reason for Visit:  On Radiation Treatment Visit     Treatment Summary to Date  Treatment Site: left breast + LNs, left SCV Current Dose: 1200/6000, 1200/5000 cGy Fractions: ,       Chemotherapy  Chemo concurrent with radx?: No (Chemo completed 2018 - Dr. Bee)    Subjective:   Tolerating RT well, with moderate fatigue.    Nursing ROS:   Nutrition Alteration  Diet Type: Patient's Preference  Skin  Skin Reaction: 0 - No changes  Skin Intervention: patient using Aquaphor two to three times daily        Cardiovascular  Respiratory effort: 1 - Normal - without distress        Psychosocial  Mood - Anxiety: 0 - Normal  Mood - Depression: 0 - Normal  Pyschosocial Note: patient reports slept most of day yesterday, feels re-energized today  Pain Assessment  0-10 Pain Scale: 0      Objective:   /78 (BP Location: Right arm, Patient Position: Chair, Cuff Size: Adult Regular)  Pulse 50  Resp 16  Wt 120 lb 8 oz  SpO2 99%  BMI 19.95 kg/m2  NAD  No skin changes in L breast/axilla  Labs:  CBC RESULTS:   Recent Labs   Lab Test  18   1409   WBC  7.2   RBC  4.12   HGB  12.7   HCT  38.2   MCV  93   MCH  30.8   MCHC  33.2   RDW  12.8   PLT  183     ELECTROLYTES:  Recent Labs   Lab Test  18   1409   NA  143   POTASSIUM  3.9   CHLORIDE  109   BERTIN  9.6   CO2  28   BUN  12   CR  0.77   GLC  102*       Assessment:    Tolerating radiation therapy well.  All questions and concerns addressed.    Plan:   1. Continue current therapy.        Mosaiq chart and setup information reviewed  Ports checked    Medication Review  Med list reviewed with patient?: Yes  Med list printed and  given: Offered and declined    Educational Topic Discussed  Education Instructions: radiation therapy side effects: fatigue and fatigue management, skin changes and skin cares      Melvin Jones MD

## 2018-08-28 ENCOUNTER — APPOINTMENT (OUTPATIENT)
Dept: RADIATION ONCOLOGY | Facility: CLINIC | Age: 73
End: 2018-08-28
Payer: COMMERCIAL

## 2018-08-28 PROCEDURE — G6013 RADIATION TREATMENT DELIVERY: HCPCS | Performed by: RADIOLOGY

## 2018-08-28 PROCEDURE — G6017 INTRAFRACTION TRACK MOTION: HCPCS | Performed by: RADIOLOGY

## 2018-08-29 ENCOUNTER — APPOINTMENT (OUTPATIENT)
Dept: RADIATION ONCOLOGY | Facility: CLINIC | Age: 73
End: 2018-08-29
Payer: COMMERCIAL

## 2018-08-29 PROCEDURE — G6013 RADIATION TREATMENT DELIVERY: HCPCS | Performed by: RADIOLOGY

## 2018-08-29 PROCEDURE — G6017 INTRAFRACTION TRACK MOTION: HCPCS | Performed by: RADIOLOGY

## 2018-08-30 ENCOUNTER — APPOINTMENT (OUTPATIENT)
Dept: RADIATION ONCOLOGY | Facility: CLINIC | Age: 73
End: 2018-08-30
Payer: COMMERCIAL

## 2018-08-30 PROCEDURE — G6013 RADIATION TREATMENT DELIVERY: HCPCS | Performed by: RADIOLOGY

## 2018-08-30 PROCEDURE — G6017 INTRAFRACTION TRACK MOTION: HCPCS | Performed by: RADIOLOGY

## 2018-08-31 ENCOUNTER — APPOINTMENT (OUTPATIENT)
Dept: RADIATION ONCOLOGY | Facility: CLINIC | Age: 73
End: 2018-08-31
Payer: COMMERCIAL

## 2018-08-31 PROCEDURE — G6013 RADIATION TREATMENT DELIVERY: HCPCS | Performed by: RADIOLOGY

## 2018-08-31 PROCEDURE — 77334 RADIATION TREATMENT AID(S): CPT | Performed by: RADIOLOGY

## 2018-08-31 PROCEDURE — G6017 INTRAFRACTION TRACK MOTION: HCPCS | Performed by: RADIOLOGY

## 2018-08-31 PROCEDURE — 77427 RADIATION TX MANAGEMENT X5: CPT | Performed by: RADIOLOGY

## 2018-08-31 PROCEDURE — 77336 RADIATION PHYSICS CONSULT: CPT | Mod: 59 | Performed by: RADIOLOGY

## 2018-08-31 PROCEDURE — 77417 THER RADIOLOGY PORT IMAGE(S): CPT | Performed by: RADIOLOGY

## 2018-08-31 PROCEDURE — 77307 TELETHX ISODOSE PLAN CPLX: CPT | Performed by: RADIOLOGY

## 2018-09-04 ENCOUNTER — APPOINTMENT (OUTPATIENT)
Dept: RADIATION ONCOLOGY | Facility: CLINIC | Age: 73
End: 2018-09-04
Payer: COMMERCIAL

## 2018-09-04 ENCOUNTER — OFFICE VISIT (OUTPATIENT)
Dept: RADIATION ONCOLOGY | Facility: CLINIC | Age: 73
End: 2018-09-04
Payer: COMMERCIAL

## 2018-09-04 VITALS
DIASTOLIC BLOOD PRESSURE: 65 MMHG | BODY MASS INDEX: 19.71 KG/M2 | WEIGHT: 119 LBS | HEART RATE: 59 BPM | SYSTOLIC BLOOD PRESSURE: 130 MMHG

## 2018-09-04 DIAGNOSIS — C50.912 INVASIVE DUCTAL CARCINOMA OF LEFT BREAST (H): Primary | ICD-10-CM

## 2018-09-04 PROCEDURE — 99207 ZZC DROP WITH A PROCEDURE: CPT | Performed by: RADIOLOGY

## 2018-09-04 PROCEDURE — G6013 RADIATION TREATMENT DELIVERY: HCPCS | Performed by: RADIOLOGY

## 2018-09-04 PROCEDURE — G6017 INTRAFRACTION TRACK MOTION: HCPCS | Performed by: RADIOLOGY

## 2018-09-04 NOTE — MR AVS SNAPSHOT
After Visit Summary   9/4/2018    Radha Patel    MRN: [id number]           Patient Information     Date Of Birth          1945        Visit Information        Provider Department      9/4/2018 8:00 AM Melvin Jones MD [facility]        Today's Diagnoses     Invasive ductal carcinoma of left breast (H)    -  1       Follow-ups after your visit        Your next 10 appointments already scheduled     Sep 05, 2018 11:00 AM CDT   TREATMENT with RADIATION THERAPIST   [facility]    [address] [facility] [address]   [phone]            Sep 06, 2018 11:00 AM CDT   TREATMENT with RADIATION THERAPIST   [facility]    [address] [facility] [address]   [phone]            Sep 07, 2018 11:00 AM CDT   TREATMENT with RADIATION THERAPIST   [facility] ([facility]    [address] [facility] [address]   [phone]            Sep 10, 2018 10:30 AM CDT   TREATMENT with RADIATION THERAPIST   [facility] ([facility]    [address] [facility] [address]   [phone]            Sep 10, 2018 11:00 AM CDT   on treatment visit with Melvin Jones MD   [facility] ([facility]    [address] [facility] [address]   [phone]            Sep 11, 2018  9:45 AM CDT   TREATMENT with RADIATION THERAPIST   [facility] ([facility])    [address] [facility] [address]   [phone]            Sep 12, 2018  8:15 AM CDT   TREATMENT with RADIATION THERAPIST   [facility] ([facility])    [address] [facility] [address]   [phone]            Sep 13, 2018  8:15 AM CDT   TREATMENT with RADIATION THERAPIST   [facility]    [address] [facility] [address]   [phone]            Sep 14, 2018  8:15 AM CDT   TREATMENT with RAD[facility] THERAPIST   [facility]    [address] [facility] [address] [phone]            Sep 17, 2018  8:15 AM CDT   TREATMENT with RADIATION THERAPIST   [facility]    [address] [id number] [phone]              Who to contact     If you have questions or need follow up information about today's clinic visit or your schedule please contact [facility] directly at [phone].  Normal or non-critical lab and imaging results will be communicated to you by MyChart, letter or phone within 4 business days after the clinic has received the results. If you do not hear from us within 7 days, please contact the clinic through MyChart or phone. If you have a critical or abnormal lab result, we will notify you by phone as soon as possible.  Submit refill requests through [employer] V[employer] or call your pharmacy and they will forward the refill request to us. Please allow 3 business days for your refill to be completed.          Additional Information About Your Visit        Care EveryWhere ID     This is your Care EveryWhere ID. This could be used by other organizations to access your [facility] medical records  [id number]        Your Vitals Were     Pulse BMI (Body Mass Index)                59 19.71 kg/m2           Blood Pressure from Last 3 Encounters:   09/04/18 130/65   08/27/18 158/78   08/20/18 137/60    Weight from Last 3 Encounters:   09/04/18 119 lb   08/27/18 120 lb 8 oz   08/20/18 122 lb              Today, you had the following     No orders found for display       Primary Care Provider Office Phone # Fax #    [facility] [phone] [address] [facility]  [facility] [address]        Equal Access to Services     CÉSAR GIBSON : Hadii aad ku hadameenahollie Justin, wagriseldathien greenberg, qarody lowery. So [facility] [phone].    ATENCIÓN: Si habla español, tiene a yoon disposición servicios gratuitos de asistencia lingüística. Llame al [phone].    We comply with applicable federal civil rights laws and [place] laws. We do not discriminate on the basis of race, color, national origin, age, disability, sex, sexual orientation, or gender identity.            Thank you!     Thank you for choosing [facility]  for your care. Our goal is always to provide you with excellent care. Hearing back from our patients is one way we can continue to improve our services. Please take a few minutes to complete the written survey that you may receive in the mail after your visit with us. Thank you!             Your Updated Medication List - Protect others around you: Learn how to safely use, store and throw away your medicines at www.disposemymeds.org.          This list is accurate as of 9/4/18  8:30 AM.  Always use your most recent med list.                   Brand Name Dispense Instructions for use Diagnosis    anastrozole 1 MG tablet    ARIMIDEX    90 tablet    Take 1 tablet (1 mg) by mouth daily    Invasive ductal carcinoma of left breast (H), Other osteoporosis without current pathological fracture, Aromatase inhibitor use       ASPIRIN PO      Take 81 mg by mouth daily    Invasive ductal carcinoma of left breast (H), Other osteoporosis without current pathological fracture, Aromatase inhibitor use       CALCIUM PO      Take 1,000 mg by mouth daily        FLUoxetine 10 MG capsule    PROzac     Take 10 mg by mouth daily    Invasive ductal carcinoma of left breast (H), Other osteoporosis without current pathological fracture, Aromatase inhibitor use       LIPITOR PO      Take 40 mg by mouth daily.         LISINOPRIL PO      Take 20 mg by mouth daily.        metoprolol tartrate 25 MG tablet    LOPRESSOR     Take 25 mg by mouth daily        PLAVIX PO      Take 75 mg by mouth daily.        VITAMIN D-3 PO      Take 2,000 mg by mouth

## 2018-09-04 NOTE — PROGRESS NOTES
[facility] PHYSICIANS  SPECIALIZING IN BREAKTHROUGHS  Radiation Oncology    On Treatment Visit Note      Radha Patel      Date: 2018   MRN: [id number]   : 1945  Diagnosis: breast cancer      Reason for Visit:  On Radiation Treatment Visit     Treatment Summary to Date  Treatment Site: left breast + LNs, left SCV Current Dose: 2000/6000, 2000/5000 cGy Fractions: 10/30, 10/25      Chemotherapy  Chemo concurrent with radx?: No (Chemo completed 2018 - Dr. Bee)    Subjective:   Tolerating RT well, no significant skin reaction or fatigue.    Nursing ROS:   Nutrition Alteration  Diet Type: Patient's Preference  Skin  Skin Reaction: 0 - No changes  Skin Intervention: Aquaphor twice daily        Cardiovascular  Respiratory effort: 1 - Normal - without distress        Psychosocial  Mood - Anxiety: 0 - Normal  Mood - Depression: 0 - Normal  Pain Assessment  0-10 Pain Scale: 0      Objective:   /65  Pulse 59  Wt 119 lb  BMI 19.71 kg/m2  NAD  No erythema or desquamation in L breast/axilla    Labs:  CBC RESULTS:   Recent Labs   Lab Test  18   1409   WBC  7.2   RBC  4.12   HGB  12.7   HCT  38.2   MCV  93   MCH  30.8   MCHC  33.2   RDW  12.8   PLT  183     ELECTROLYTES:  Recent Labs   Lab Test  18   1409   NA  143   POTASSIUM  3.9   CHLORIDE  109   BERTIN  9.6   CO2  28   BUN  12   CR  0.77   GLC  102*       Assessment:    Tolerating radiation therapy well.  All questions and concerns addressed.    Plan:   1. Continue current therapy.  Discussed skin care and plan for boost.      Mosaiq chart and setup information reviewed  Ports checked    Medication Review  Med list reviewed with patient?: Yes    Educational Topic Discussed  Additional Instructions: Hard of Hearing      Melvin Jones MD

## 2018-09-05 ENCOUNTER — APPOINTMENT (OUTPATIENT)
Dept: RADIATION ONCOLOGY | Facility: CLINIC | Age: 73
End: 2018-09-05
Payer: COMMERCIAL

## 2018-09-05 PROCEDURE — G6017 INTRAFRACTION TRACK MOTION: HCPCS | Performed by: RADIOLOGY

## 2018-09-05 PROCEDURE — G6013 RADIATION TREATMENT DELIVERY: HCPCS | Performed by: RADIOLOGY

## 2018-09-07 ENCOUNTER — APPOINTMENT (OUTPATIENT)
Dept: RADIATION ONCOLOGY | Facility: CLINIC | Age: 73
End: 2018-09-07
Payer: COMMERCIAL

## 2018-09-07 PROCEDURE — G6013 RADIATION TREATMENT DELIVERY: HCPCS | Performed by: RADIOLOGY

## 2018-09-07 PROCEDURE — G6017 INTRAFRACTION TRACK MOTION: HCPCS | Performed by: RADIOLOGY

## 2018-09-10 ENCOUNTER — APPOINTMENT (OUTPATIENT)
Dept: RADIATION ONCOLOGY | Facility: CLINIC | Age: 73
End: 2018-09-10
Payer: COMMERCIAL

## 2018-09-10 ENCOUNTER — OFFICE VISIT (OUTPATIENT)
Dept: RADIATION ONCOLOGY | Facility: CLINIC | Age: 73
End: 2018-09-10
Payer: COMMERCIAL

## 2018-09-10 VITALS
HEART RATE: 53 BPM | RESPIRATION RATE: 16 BRPM | WEIGHT: 119 LBS | SYSTOLIC BLOOD PRESSURE: 136 MMHG | BODY MASS INDEX: 19.71 KG/M2 | OXYGEN SATURATION: 97 % | DIASTOLIC BLOOD PRESSURE: 57 MMHG

## 2018-09-10 DIAGNOSIS — C50.912 INVASIVE DUCTAL CARCINOMA OF LEFT BREAST (H): Primary | ICD-10-CM

## 2018-09-10 PROCEDURE — G6013 RADIATION TREATMENT DELIVERY: HCPCS | Performed by: RADIOLOGY

## 2018-09-10 PROCEDURE — 99207 ZZC DROP WITH A PROCEDURE: CPT | Performed by: RADIOLOGY

## 2018-09-10 PROCEDURE — G6017 INTRAFRACTION TRACK MOTION: HCPCS | Performed by: RADIOLOGY

## 2018-09-10 ASSESSMENT — PAIN SCALES - GENERAL: PAINLEVEL: NO PAIN (0)

## 2018-09-10 NOTE — MR AVS SNAPSHOT
After Visit Summary   9/10/2018    Radha Patel    MRN: [id number]           Patient Information     Date Of Birth          1945        Visit Information        Provider Department      9/10/2018 11:00 AM Melvin Jones MD [facility]        Today's Diagnoses     Invasive ductal carcinoma of left breast (H)    -  1      Care Instructions    [facility] RN with questions or concerns following today's appointment: [phone].    Thank you!            Follow-ups after your visit        Your next 10 appointments already scheduled     Sep 10, 2018 11:00 AM CDT   on treatment visit with Melvin Jones MD   [facility] ([facility]    [address] [facility] [address]   [phone]            Sep 11, 2018  9:45 AM CDT   TREATMENT with RADIATION THERAPIST   [facility] ([facility])    [address] [facility] [address]   [phone]            Sep 12, 2018  8:15 AM CDT   TREATMENT with RADIATION THERAPIST   [facility] ([facility])    [address] [facility] [address]   [phone]            Sep 13, 2018  8:15 AM CDT   TREATMENT with RADIATION THERAPIST   [facility] ([facility])    [address] [facility] [address]   [phone]            Sep 14, 2018  8:15 AM CDT   TREATMENT with RADIATION THERAPIST   [facility] ([facility])    [address] [facility] [address]   [phone]            Sep 17, 2018  8:15 AM CDT   TREATMENT with RADIATION THERAPIST   [facility] ([facility])    [address] [facility] [address]   [phone]            Sep 17, 2018  8:45 AM CDT   on treatment visit with Melvin Jones MD   [facility])    [address] [facility] [address]   [phone]            Sep 18, 2018  8:15 AM CDT   TREATMENT with RADIATION THERAPIST   [facility]    [address] [facility] [address]   [phone]            Sep 19, 2018  8:15 AM CDT   TREATMENT with RADIATION THERAPIST   [facility] ([facility])    [address] [facility] [address]   [phone]            Sep 20, 2018  8:15 AM CDT   TREATMENT with RADIATION THERAPIST   [facility] ([facility])    [address] [facility] [address]   [phone]              Who to contact     If you have questions or need follow up information about today's clinic visit or your schedule please contact [facility] directly at [phone].  Normal or non-critical lab and imaging results will be communicated to you by MyChart, letter or phone within 4 business days after the clinic has received the results. If you do not hear from us within 7 days, please contact the clinic through Netcord[employer]art or phone. If you have a critical or abnormal lab result, we will notify you by phone as soon as possible.  Submit refill requests through [employer] or call your pharmacy and they will forward the refill request to us. Please allow 3 business days for your refill to be completed.          Additional Information About Your Visit        Care EveryWhere ID     This is your Care EveryWhere ID. This could be used by other organizations to access your [employer] medical records  [id number]        Your Vitals Were     Pulse Respirations Pulse Oximetry BMI (Body Mass Index)          53 16 97% 19.71 kg/m2         Blood Pressure from Last 3 Encounters:   09/10/18 136/57   09/04/18 130/65   08/27/18 158/78    Weight from Last 3 Encounters:   09/10/18 119 lb   09/04/18 119 lb   08/27/18 120 lb 8 oz               Today, you had the following     No orders found for display       Primary Care Provider Office Phone # Fax #    [facility] [phone] [address] [facility] [address]        Equal Access to Services     CÉSAR GIBSON : Hadcarolina morocho darricko Sociaraali, waaxda luqadaha, qaybta kaalmada adeegyada, rody lesiain hayaan ambrosevannessa real heidy gale. So [facility] [phone].    ATENCIÓN: Si habla español, tiene a yoon disposición servicios gratuitos de asistencia lingüística. Llame al [phone].    We comply with applicable federal civil rights laws and [place] laws. We do not discriminate on the basis of race, color, national origin, age, disability, sex, sexual orientation, or gender identity.            Thank you!     Thank you for choosing [facility]  for your care. Our goal is always to provide you with excellent care. Hearing back from our patients is one way we can continue to improve our services. Please take a few minutes to complete the written survey that you may receive in the mail after your visit with us. Thank you!             Your Updated Medication List - Protect others around you: Learn how to safely use, store and throw away your medicines at www.disposemymeds.org.          This list is accurate as of 9/10/18 10:32 AM.  Always use your most recent med list.                   Brand Name Dispense Instructions for use Diagnosis    anastrozole 1 MG tablet    ARIMIDEX    90 tablet    Take 1 tablet (1 mg) by mouth daily    Invasive ductal carcinoma of left breast (H), Other osteoporosis without current pathological fracture, Aromatase inhibitor use       ASPIRIN PO      Take 81 mg by mouth daily    Invasive ductal carcinoma of left breast (H), Other osteoporosis without current pathological fracture, Aromatase inhibitor use       CALCIUM PO      Take 1,000 mg by mouth daily        FLUoxetine 10 MG capsule    PROzac     Take 10 mg by  mouth daily    Invasive ductal carcinoma of left breast (H), Other osteoporosis without current pathological fracture, Aromatase inhibitor use       LIPITOR PO      Take 40 mg by mouth daily.        LISINOPRIL PO      Take 20 mg by mouth daily.        metoprolol tartrate 25 MG tablet    LOPRESSOR     Take 25 mg by mouth daily        PLAVIX PO      Take 75 mg by mouth daily.        VITAMIN D-3 PO      Take 2,000 mg by mouth

## 2018-09-10 NOTE — PATIENT INSTRUCTIONS
Please contact [facility] On[facility] RN with questions or concerns following today's appointment: [phone].    Thank you!

## 2018-09-10 NOTE — PROGRESS NOTES
[facility] PHYSICIANS  SPECIALIZING IN BREAKTHROUGHS  Radiation Oncology    On Treatment Visit Note      Radha Patel      Date: 9/10/2018   MRN: [id number]   : 1945  Diagnosis: breast cancer      Reason for Visit:  On Radiation Treatment Visit     Treatment Summary to Date  Treatment Site: left breast + LNs, left SCV Current Dose: 2800/6000, 2800/5000 cGy Fractions: ,       Chemotherapy  Chemo concurrent with radx?: No (Chemo completed 2018 - Dr. Bee)    Subjective:   Starting to have itching in the L breast, but not bad. No pain or erythema in breast. Mild fatigue.    Nursing ROS:   Nutrition Alteration  Diet Type: Patient's Preference  Skin  Skin Reaction: 0 - No changes  Skin Intervention: Aquaphor twice daily        Cardiovascular  Respiratory effort: 1 - Normal - without distress        Psychosocial  Mood - Anxiety: 0 - Normal  Mood - Depression: 0 - Normal  Pain Assessment  0-10 Pain Scale: 0      Objective:   /57 (BP Location: Right arm, Patient Position: Chair, Cuff Size: Adult Regular)  Pulse 53  Resp 16  Wt 119 lb  SpO2 97%  BMI 19.71 kg/m2  NAD  No erythema or desquamation in L breast    Labs:  CBC RESULTS:   Recent Labs   Lab Test  18   1409   WBC  7.2   RBC  4.12   HGB  12.7   HCT  38.2   MCV  93   MCH  30.8   MCHC  33.2   RDW  12.8   PLT  183     ELECTROLYTES:  Recent Labs   Lab Test  18   1409   NA  143   POTASSIUM  3.9   CHLORIDE  109   BERTIN  9.6   CO2  28   BUN  12   CR  0.77   GLC  102*       Assessment:    Tolerating radiation therapy well.  All questions and concerns addressed.    Plan:   1. Continue current therapy.  Cont aquaphor on skin, may use calamine lotion or HC cream if itching gets bothersome.      Mosaiq chart and setup information reviewed  Ports checked    Medication Review  Med list reviewed with patient?: Yes  Med list printed and given: Offered and declined           Melvin Jones MD

## 2018-09-11 ENCOUNTER — APPOINTMENT (OUTPATIENT)
Dept: RADIATION ONCOLOGY | Facility: CLINIC | Age: 73
End: 2018-09-11
Payer: COMMERCIAL

## 2018-09-11 PROCEDURE — 77427 RADIATION TX MANAGEMENT X5: CPT | Performed by: RADIOLOGY

## 2018-09-11 PROCEDURE — G6013 RADIATION TREATMENT DELIVERY: HCPCS | Performed by: RADIOLOGY

## 2018-09-11 PROCEDURE — 77336 RADIATION PHYSICS CONSULT: CPT | Performed by: RADIOLOGY

## 2018-09-11 PROCEDURE — G6017 INTRAFRACTION TRACK MOTION: HCPCS | Performed by: RADIOLOGY

## 2018-09-12 ENCOUNTER — APPOINTMENT (OUTPATIENT)
Dept: RADIATION ONCOLOGY | Facility: CLINIC | Age: 73
End: 2018-09-12
Payer: COMMERCIAL

## 2018-09-12 PROCEDURE — G6017 INTRAFRACTION TRACK MOTION: HCPCS | Performed by: RADIOLOGY

## 2018-09-12 PROCEDURE — 77417 THER RADIOLOGY PORT IMAGE(S): CPT | Performed by: RADIOLOGY

## 2018-09-12 PROCEDURE — G6013 RADIATION TREATMENT DELIVERY: HCPCS | Performed by: RADIOLOGY

## 2018-09-13 ENCOUNTER — APPOINTMENT (OUTPATIENT)
Dept: RADIATION ONCOLOGY | Facility: CLINIC | Age: 73
End: 2018-09-13
Payer: COMMERCIAL

## 2018-09-13 PROCEDURE — G6013 RADIATION TREATMENT DELIVERY: HCPCS | Performed by: RADIOLOGY

## 2018-09-13 PROCEDURE — G6017 INTRAFRACTION TRACK MOTION: HCPCS | Performed by: RADIOLOGY

## 2018-09-14 ENCOUNTER — APPOINTMENT (OUTPATIENT)
Dept: RADIATION ONCOLOGY | Facility: CLINIC | Age: 73
End: 2018-09-14
Payer: COMMERCIAL

## 2018-09-14 PROCEDURE — G6013 RADIATION TREATMENT DELIVERY: HCPCS | Performed by: RADIOLOGY

## 2018-09-14 PROCEDURE — G6017 INTRAFRACTION TRACK MOTION: HCPCS | Performed by: RADIOLOGY

## 2018-09-17 ENCOUNTER — OFFICE VISIT (OUTPATIENT)
Dept: RADIATION ONCOLOGY | Facility: CLINIC | Age: 73
End: 2018-09-17
Payer: COMMERCIAL

## 2018-09-17 ENCOUNTER — APPOINTMENT (OUTPATIENT)
Dept: RADIATION ONCOLOGY | Facility: CLINIC | Age: 73
End: 2018-09-17
Payer: COMMERCIAL

## 2018-09-17 VITALS
SYSTOLIC BLOOD PRESSURE: 147 MMHG | HEART RATE: 60 BPM | OXYGEN SATURATION: 97 % | BODY MASS INDEX: 19.87 KG/M2 | WEIGHT: 120 LBS | DIASTOLIC BLOOD PRESSURE: 70 MMHG | RESPIRATION RATE: 18 BRPM

## 2018-09-17 DIAGNOSIS — C50.912 INVASIVE DUCTAL CARCINOMA OF LEFT BREAST (H): Primary | ICD-10-CM

## 2018-09-17 PROCEDURE — 99207 ZZC DROP WITH A PROCEDURE: CPT | Performed by: RADIOLOGY

## 2018-09-17 PROCEDURE — G6013 RADIATION TREATMENT DELIVERY: HCPCS | Performed by: RADIOLOGY

## 2018-09-17 PROCEDURE — G6017 INTRAFRACTION TRACK MOTION: HCPCS | Performed by: RADIOLOGY

## 2018-09-17 ASSESSMENT — PAIN SCALES - GENERAL: PAINLEVEL: NO PAIN (0)

## 2018-09-17 NOTE — MR AVS SNAPSHOT
After Visit Summary   9/17/2018    Radha Patel    MRN: [id number]           Patient Information     Date Of Birth          1945        Visit Information        Provider Department      9/17/2018 8:45 AM Melvin Jones MD [facility]        Today's Diagnoses     Invasive ductal carcinoma of left breast (H)    -  1      Care Instructions    Please contact [facility] RN with questions or concerns following today's appointment: [phone].    Thank you!            Follow-ups after your visit        Your next 10 appointments already scheduled     Sep 18, 2018  8:15 AM CDT   TREATMENT with RADIATION THERAPIST   [facility]    [address] [facility] [address]   [phone]            Sep 19, 2018  8:15 AM CDT   TREATMENT with RADIATION THERAPIST   [facility]    [address] [facility] [address]   [phone]            Sep 20, 2018  8:15 AM CDT   TREATMENT with RADIATION THERAPIST   [facility]    [address] [facility] [address]   [phone]            Sep 21, 2018  8:15 AM CDT   TREATMENT with RADIATION THERAPIST   [facility]    [address] [facility] [address]   [phone]            Sep 24, 2018  8:15 AM CDT   TREATMENT with RADIATION THERAPIST   [facility]    [address] [facility] [address]   [phone]            Sep 24, 2018  8:45 AM CDT   on treatment visit with Melvin Jones MD   [facility]    [address] [facility] [address]   [phone]            Sep 25, 2018  8:15 AM CDT   TREATMENT with RADIATION THERAPIST   [facility] ([facility]  [facility])    [address] [facility] [address]   [phone]            Sep 26, 2018  8:15 AM CDT   TREATMENT with RADIATION THERAPIST   [facility] ([facility])    [address] [facility] [address]   [phone]            Sep 27, 2018  8:15 AM CDT   TREATMENT with RADIATION THERAPIST   [facility] ([facility])    [address] [facility] [address] [phone]            Sep 28, 2018  8:15 AM CDT   TREATMENT with RADIATION THERAPIST   [facility] ([facility])    [address] [facility] [address] [phone]              Who to contact     If you have questions or need follow up information about today's clinic visit or your schedule please contact [facility] directly at [phone].  Normal or non-critical lab and imaging results will be communicated to you by MyChart, letter or phone within 4 business days after the clinic has received the results. If you do not hear from us within 7 days, please contact the clinic through [employer] or phone. If you have a critical or abnormal lab result, we will notify you by phone as soon as possible.  Submit refill requests through [employer] or call your pharmacy and they will forward the refill request to us. Please allow 3 business days for your refill to be completed.          Additional Information About Your Visit        Care EveryWhere ID     This is your Care EveryWhere ID. This could be used by other organizations to access your [place] medical records  [id number]        Your Vitals Were     Pulse Respirations Pulse Oximetry BMI (Body Mass Index)          60 18 97% 19.87 kg/m2         Blood Pressure from Last 3 Encounters:   09/17/18 147/70   09/10/18 136/57   09/04/18 130/65    Weight from Last 3 Encounters:   09/17/18 120 lb   09/10/18 119 lb   09/04/18 119 lb              Today, you had  the following     No orders found for display       Primary Care Provider Office Phone # Fax #    [facility] [phone] [address] [facility] [address]        Equal Access to Services     CÉSAR GIBSON : Hadii zina morocho darricko Sociaraali, waaxda luqadaha, qaybta kaalmada adeeli, rody real heidy gale. So [facility] [phone].    ATENCIÓN: Si habla español, tiene a yoon disposición servicios gratuitos de asistencia lingüística. Llame al [phone].    We comply with applicable federal civil rights laws and [place] laws. We do not discriminate on the basis of race, color, national origin, age, disability, sex, sexual orientation, or gender identity.            Thank you!     Thank you for choosing [facility]  for your care. Our goal is always to provide you with excellent care. Hearing back from our patients is one way we can continue to improve our services. Please take a few minutes to complete the written survey that you may receive in the mail after your visit with us. Thank you!             Your Updated Medication List - Protect others around you: Learn how to safely use, store and throw away your medicines at www.disposemymeds.org.          This list is accurate as of 9/17/18  9:10 AM.  Always use your most recent med list.                   Brand Name Dispense Instructions for use Diagnosis    anastrozole 1 MG tablet    ARIMIDEX    90 tablet    Take 1 tablet (1 mg) by mouth daily    Invasive ductal carcinoma of left breast (H), Other osteoporosis without current pathological fracture, Aromatase inhibitor use       ASPIRIN PO      Take 81 mg by mouth daily    Invasive ductal carcinoma of left breast (H), Other osteoporosis without current pathological fracture, Aromatase inhibitor use       CALCIUM PO      Take 1,000 mg by mouth daily        FLUoxetine 10 MG capsule    PROzac     Take 10 mg by mouth daily     Invasive ductal carcinoma of left breast (H), Other osteoporosis without current pathological fracture, Aromatase inhibitor use       LIPITOR PO      Take 40 mg by mouth daily.        LISINOPRIL PO      Take 20 mg by mouth daily.        metoprolol tartrate 25 MG tablet    LOPRESSOR     Take 25 mg by mouth daily        PLAVIX PO      Take 75 mg by mouth daily.        VITAMIN D-3 PO      Take 2,000 mg by mouth

## 2018-09-17 NOTE — PATIENT INSTRUCTIONS
Please contact [facility] RN with questions or concerns following today's appointment: [phone].    Thank you!

## 2018-09-17 NOTE — PROGRESS NOTES
[facility] PHYSICIANS  SPECIALIZING IN BREAKTHROUGHS  Radiation Oncology    On Treatment Visit Note      Radha Patel      Date: 2018   MRN: [id number]   : 1945  Diagnosis: breast cancer      Reason for Visit:  On Radiation Treatment Visit     Treatment Summary to Date  Treatment Site: left breast + LNs, left SCV Current Dose: 3800/6000, 3800/5000,  cGy Fractions: , , 0/5      Chemotherapy  Chemo concurrent with radx?: No (Chemo completed 2018 - Dr. Bee)    Subjective:   Tolerating RT well, faint hyperpigmentation in L axilla and itchiness. Good energy/appetite.    Nursing ROS:   Nutrition Alteration  Diet Type: Patient's Preference  Skin  Skin Reaction: 1 - Faint erythema or dry desquamation  Skin Intervention: Aquaphor twice daily  Skin Progress: patient reports pruritis of left breast/nipple, discussed use of Hydrocortisone 1% cream  Skin Note: hyperpigmentation noted, greater in left axilla, no desquamation noted        Cardiovascular  Respiratory effort: 1 - Normal - without distress        Psychosocial  Mood - Anxiety: 0 - Normal  Mood - Depression: 0 - Normal  Pyschosocial Note: energy level at baseline  Pain Assessment  0-10 Pain Scale: 0      Objective:   /70 (BP Location: Right arm, Patient Position: Chair, Cuff Size: Adult Regular)  Pulse 60  Resp 18  Wt 120 lb  SpO2 97%  BMI 19.87 kg/m2  NAD  Faint hyperpigmentation w/o desquamation in L axilla    Labs:  CBC RESULTS:   Recent Labs   Lab Test  18   1409   WBC  7.2   RBC  4.12   HGB  12.7   HCT  38.2   MCV  93   MCH  30.8   MCHC  33.2   RDW  12.8   PLT  183     ELECTROLYTES:  Recent Labs   Lab Test  18   1409   NA  143   POTASSIUM  3.9   CHLORIDE  109   BERTIN  9.6   CO2  28   BUN  12   CR  0.77   GLC  102*       Assessment:    Tolerating radiation therapy well.  All questions and concerns addressed.    Plan:   1. Continue current therapy. Discussed skin care.      Mosaiq chart and  setup information reviewed  Ports checked    Medication Review  Med list reviewed with patient?: Yes  Med list printed and given: Offered and declined    Educational Topic Discussed  Education Instructions: reviewed continued skin cares, reviewed skin changes, discussed use of Hydrocortisone 1% cream      Melvin Jones MD

## 2018-09-18 ENCOUNTER — APPOINTMENT (OUTPATIENT)
Dept: RADIATION ONCOLOGY | Facility: CLINIC | Age: 73
End: 2018-09-18
Payer: COMMERCIAL

## 2018-09-18 PROCEDURE — 77427 RADIATION TX MANAGEMENT X5: CPT | Performed by: RADIOLOGY

## 2018-09-18 PROCEDURE — G6013 RADIATION TREATMENT DELIVERY: HCPCS | Performed by: RADIOLOGY

## 2018-09-18 PROCEDURE — G6002 STEREOSCOPIC X-RAY GUIDANCE: HCPCS | Performed by: RADIOLOGY

## 2018-09-18 PROCEDURE — 77336 RADIATION PHYSICS CONSULT: CPT | Performed by: RADIOLOGY

## 2018-09-19 ENCOUNTER — APPOINTMENT (OUTPATIENT)
Dept: RADIATION ONCOLOGY | Facility: CLINIC | Age: 73
End: 2018-09-19
Payer: COMMERCIAL

## 2018-09-19 PROCEDURE — G6002 STEREOSCOPIC X-RAY GUIDANCE: HCPCS | Performed by: RADIOLOGY

## 2018-09-19 PROCEDURE — G6013 RADIATION TREATMENT DELIVERY: HCPCS | Performed by: RADIOLOGY

## 2018-09-20 ENCOUNTER — APPOINTMENT (OUTPATIENT)
Dept: RADIATION ONCOLOGY | Facility: CLINIC | Age: 73
End: 2018-09-20
Payer: COMMERCIAL

## 2018-09-20 PROCEDURE — G6013 RADIATION TREATMENT DELIVERY: HCPCS | Performed by: RADIOLOGY

## 2018-09-20 PROCEDURE — 77280 THER RAD SIMULAJ FIELD SMPL: CPT | Performed by: RADIOLOGY

## 2018-09-20 PROCEDURE — G6002 STEREOSCOPIC X-RAY GUIDANCE: HCPCS | Performed by: RADIOLOGY

## 2018-09-21 ENCOUNTER — APPOINTMENT (OUTPATIENT)
Dept: RADIATION ONCOLOGY | Facility: CLINIC | Age: 73
End: 2018-09-21
Payer: COMMERCIAL

## 2018-09-21 PROCEDURE — G6002 STEREOSCOPIC X-RAY GUIDANCE: HCPCS | Performed by: RADIOLOGY

## 2018-09-21 PROCEDURE — G6013 RADIATION TREATMENT DELIVERY: HCPCS | Performed by: RADIOLOGY

## 2018-09-23 LAB — COPATH REPORT: NORMAL

## 2018-09-24 ENCOUNTER — OFFICE VISIT (OUTPATIENT)
Dept: RADIATION ONCOLOGY | Facility: CLINIC | Age: 73
End: 2018-09-24
Payer: COMMERCIAL

## 2018-09-24 ENCOUNTER — APPOINTMENT (OUTPATIENT)
Dept: RADIATION ONCOLOGY | Facility: CLINIC | Age: 73
End: 2018-09-24
Payer: COMMERCIAL

## 2018-09-24 VITALS
OXYGEN SATURATION: 100 % | WEIGHT: 120 LBS | BODY MASS INDEX: 19.87 KG/M2 | RESPIRATION RATE: 18 BRPM | SYSTOLIC BLOOD PRESSURE: 144 MMHG | DIASTOLIC BLOOD PRESSURE: 64 MMHG | HEART RATE: 53 BPM | TEMPERATURE: 97.7 F

## 2018-09-24 DIAGNOSIS — C50.912 INVASIVE DUCTAL CARCINOMA OF LEFT BREAST (H): Primary | ICD-10-CM

## 2018-09-24 PROCEDURE — G6002 STEREOSCOPIC X-RAY GUIDANCE: HCPCS | Performed by: RADIOLOGY

## 2018-09-24 PROCEDURE — 99207 ZZC DROP WITH A PROCEDURE: CPT | Performed by: RADIOLOGY

## 2018-09-24 PROCEDURE — G6013 RADIATION TREATMENT DELIVERY: HCPCS | Performed by: RADIOLOGY

## 2018-09-24 ASSESSMENT — PAIN SCALES - GENERAL: PAINLEVEL: NO PAIN (0)

## 2018-09-24 NOTE — PATIENT INSTRUCTIONS
Please contact [facility] RN with questions or concerns following today's appointment: [phone].    Thank you!

## 2018-09-24 NOTE — MR AVS SNAPSHOT
After Visit Summary   9/24/2018    Radha Patel    MRN: [id number]           Patient Information     Date Of Birth          1945        Visit Information        Provider Department      9/24/2018 8:45 AM Melvin Jones MD [facility]        Today's Diagnoses     Invasive ductal carcinoma of left breast (H)    -  1      Care Instructions    Please contact [facility] RN with questions or concerns following today's appointment: [phone].    Thank you!            Follow-ups after your visit        Your next 10 appointments already scheduled     Sep 25, 2018  8:15 AM CDT   TREATMENT with RADIATION THERAPIST   [facility] ([facility])    [address] [facility] [address]   [phone]            Sep 26, 2018  8:15 AM CDT   TREATMENT with RADIATION THERAPIST   [facility] ([facility])    [address] [facility] [address]   [phone]            Sep 27, 2018  8:15 AM CDT   TREATMENT with RADIATION THERAPIST   [facility] ([facility])    [address] [facility] [address]   [phone]            Sep 28, 2018  8:15 AM CDT   TREATMENT with RADIATION THERAPIST   [facility] ([facility])    [address] [facility] [address]   [phone]            Oct 01, 2018  8:15 AM CDT   TREATMENT with RADIATION THERAPIST   [facility] ([facility])    [address] [facility] [address]   [phone]            Oct 01, 2018  8:45 AM CDT   on treatment visit with Melvin Jones MD   [facility] ([facility])    [address] [facility] [address]   [phone]            Oct 02, 2018  9:15 AM CDT   TREATMENT with RADIATION THERAPIST   [facility] ([facility]  [facility])    [address] [place] [id number] [phone]            Oct 05, 2018  3:30 PM CDT   Return Visit with Chastity Bee MD   [facility] ([facility])    [address] [place] [address] [phone]              Who to contact     If you have questions or need follow up information about today's clinic visit or your schedule please contact [facility] directly at [phone].  Normal or non-critical lab and imaging results will be communicated to you by MyChart, letter or phone within 4 business days after the clinic has received the results. If you do not hear from us within 7 days, please contact the clinic through MyChart or phone. If you have a critical or abnormal lab result, we will notify you by phone as soon as possible.  Submit refill requests through [employer] or call your pharmacy and they will forward the refill request to us. Please allow 3 business days for your refill to be completed.          Additional Information About Your Visit        Care EveryWhere ID     This is your Care EveryWhere ID. This could be used by other organizations to access your [place] medical records  [id number]        Your Vitals Were     Pulse Temperature Respirations Pulse Oximetry BMI (Body Mass Index)       53 97.7  F (36.5  C) (Oral) 18 100% 19.87 kg/m2        Blood Pressure from Last 3 Encounters:   09/24/18 144/64   09/17/18 147/70   09/10/18 136/57    Weight from Last 3 Encounters:   09/24/18 120 lb   09/17/18 120 lb   09/10/18 119 lb              Today, you had the following     No orders found for display       Primary Care Provider Office Phone # Fax #    [facility] [phone] [address] [facility] [address]        Equal Access to Services     CÉSAR GIBSON AH: Tony Justin, waaxda luqadaha, qaybta kaalmada carmita, rody levine  farhan moody ah. So [facility] [phone].    ATENCIÓN: Si habla corrie, tiene a yoon disposición servicios gratuitos de asistencia lingüística. Tom al [phone].    We comply with applicable federal civil rights laws and [place] laws. We do not discriminate on the basis of race, color, national origin, age, disability, sex, sexual orientation, or gender identity.            Thank you!     Thank you for choosing [facility]  for your care. Our goal is always to provide you with excellent care. Hearing back from our patients is one way we can continue to improve our services. Please take a few minutes to complete the written survey that you may receive in the mail after your visit with us. Thank you!             Your Updated Medication List - Protect others around you: Learn how to safely use, store and throw away your medicines at www.disposemymeds.org.          This list is accurate as of 9/24/18  9:35 AM.  Always use your most recent med list.                   Brand Name Dispense Instructions for use Diagnosis    anastrozole 1 MG tablet    ARIMIDEX    90 tablet    Take 1 tablet (1 mg) by mouth daily    Invasive ductal carcinoma of left breast (H), Other osteoporosis without current pathological fracture, Aromatase inhibitor use       ASPIRIN PO      Take 81 mg by mouth daily    Invasive ductal carcinoma of left breast (H), Other osteoporosis without current pathological fracture, Aromatase inhibitor use       CALCIUM PO      Take 1,000 mg by mouth daily        FLUoxetine 10 MG capsule    PROzac     Take 10 mg by mouth daily    Invasive ductal carcinoma of left breast (H), Other osteoporosis without current pathological fracture, Aromatase inhibitor use       LIPITOR PO      Take 40 mg by mouth daily.        LISINOPRIL PO      Take 20 mg by mouth daily.        metoprolol tartrate 25 MG tablet    LOPRESSOR     Take 25 mg by mouth daily        PLAVIX PO      Take 75 mg by mouth daily.         VITAMIN D-3 PO      Take 2,000 mg by mouth

## 2018-09-24 NOTE — PROGRESS NOTES
[facility] PHYSICIANS  SPECIALIZING IN BREAKTHROUGHS  Radiation Oncology    On Treatment Visit Note      Radha Patel      Date: 2018   MRN: [id number]   : 1945  Diagnosis: breast cancer      Reason for Visit:  On Radiation Treatment Visit     Treatment Summary to Date  Treatment Site: left breast + LNs, left SCV, left axillary boost Current Dose: 4800/6000, 4800/5000,  cGy Fractions: 24/30, 24, 0/5      Chemotherapy  Chemo concurrent with radx?: No (Chemo completed 2018 - Dr. Bee)    Subjective:   Tolerating RT well, faint erythema in L breast. No pain. Sister notes her hearing has gotten worse.    Nursing ROS:   Nutrition Alteration  Diet Type: Patient's Preference  Skin  Skin Intervention: Aquaphor twice daily  Skin Note: Pt reports some redness at treatment site        Cardiovascular  Respiratory effort: 1 - Normal - without distress        Psychosocial  Pyschosocial Note: denies fatigue         Objective:   /64 (BP Location: Right arm, Patient Position: Sitting, Cuff Size: Adult Regular)  Pulse 53  Temp 97.7  F (36.5  C) (Oral)  Resp 18  Wt 120 lb  SpO2 100%  BMI 19.87 kg/m2  No acute distress.  Erythema/hyperpigmentation in radiation fields. No desquamation.    Labs:  CBC RESULTS:   Recent Labs   Lab Test  18   1409   WBC  7.2   RBC  4.12   HGB  12.7   HCT  38.2   MCV  93   MCH  30.8   MCHC  33.2   RDW  12.8   PLT  183     ELECTROLYTES:  Recent Labs   Lab Test  18   1409   NA  143   POTASSIUM  3.9   CHLORIDE  109   BERTIN  9.6   CO2  28   BUN  12   CR  0.77   GLC  102*       Assessment:    Tolerating radiation therapy well.  All questions and concerns addressed.    Plan:   1. Continue current therapy and skin care.  Discussed plan of care and prognosis. Hearing loss not common from her chemo regimen, but recommended discussing with med onc. Not related to RT.      [employer] chart and setup information reviewed  Ports checked    Medication  Review  Med list reviewed with patient?: Yes  Med list printed and given: Offered and declined           Melvin Jones MD

## 2018-09-25 ENCOUNTER — APPOINTMENT (OUTPATIENT)
Dept: RADIATION ONCOLOGY | Facility: CLINIC | Age: 73
End: 2018-09-25
Payer: COMMERCIAL

## 2018-09-25 PROCEDURE — 77427 RADIATION TX MANAGEMENT X5: CPT | Performed by: RADIOLOGY

## 2018-09-25 PROCEDURE — G6002 STEREOSCOPIC X-RAY GUIDANCE: HCPCS | Performed by: RADIOLOGY

## 2018-09-25 PROCEDURE — 77336 RADIATION PHYSICS CONSULT: CPT | Performed by: RADIOLOGY

## 2018-09-25 PROCEDURE — G6013 RADIATION TREATMENT DELIVERY: HCPCS | Performed by: RADIOLOGY

## 2018-09-26 ENCOUNTER — APPOINTMENT (OUTPATIENT)
Dept: RADIATION ONCOLOGY | Facility: CLINIC | Age: 73
End: 2018-09-26
Payer: COMMERCIAL

## 2018-09-26 PROCEDURE — G6012 RADIATION TREATMENT DELIVERY: HCPCS | Performed by: RADIOLOGY

## 2018-09-27 ENCOUNTER — APPOINTMENT (OUTPATIENT)
Dept: RADIATION ONCOLOGY | Facility: CLINIC | Age: 73
End: 2018-09-27
Payer: COMMERCIAL

## 2018-09-27 PROCEDURE — G6012 RADIATION TREATMENT DELIVERY: HCPCS | Performed by: RADIOLOGY

## 2018-09-28 ENCOUNTER — APPOINTMENT (OUTPATIENT)
Dept: RADIATION ONCOLOGY | Facility: CLINIC | Age: 73
End: 2018-09-28
Payer: COMMERCIAL

## 2018-09-28 PROCEDURE — G6012 RADIATION TREATMENT DELIVERY: HCPCS | Performed by: RADIOLOGY

## 2018-10-01 ENCOUNTER — APPOINTMENT (OUTPATIENT)
Dept: RADIATION ONCOLOGY | Facility: CLINIC | Age: 73
End: 2018-10-01
Payer: COMMERCIAL

## 2018-10-01 ENCOUNTER — OFFICE VISIT (OUTPATIENT)
Dept: RADIATION ONCOLOGY | Facility: CLINIC | Age: 73
End: 2018-10-01
Payer: COMMERCIAL

## 2018-10-01 VITALS
RESPIRATION RATE: 16 BRPM | DIASTOLIC BLOOD PRESSURE: 55 MMHG | OXYGEN SATURATION: 99 % | BODY MASS INDEX: 19.87 KG/M2 | HEART RATE: 50 BPM | SYSTOLIC BLOOD PRESSURE: 105 MMHG | WEIGHT: 120 LBS

## 2018-10-01 DIAGNOSIS — C50.912 INVASIVE DUCTAL CARCINOMA OF LEFT BREAST (H): Primary | ICD-10-CM

## 2018-10-01 PROCEDURE — G6012 RADIATION TREATMENT DELIVERY: HCPCS | Performed by: RADIOLOGY

## 2018-10-01 PROCEDURE — 99207 ZZC DROP WITH A PROCEDURE: CPT | Performed by: RADIOLOGY

## 2018-10-01 ASSESSMENT — PAIN SCALES - GENERAL: PAINLEVEL: NO PAIN (0)

## 2018-10-01 NOTE — MR AVS SNAPSHOT
After Visit Summary   10/1/2018    Radha Patel    MRN: [id number]           Patient Information     Date Of Birth          1945        Visit Information        Provider Department      10/1/2018 8:45 AM Melvin Jones MD [facility]        Today's Diagnoses     Invasive ductal carcinoma of left breast (H)    -  1      Care Instructions          Managing radiation side effects after treatment has ended    The side effects of radiation therapy should gradually decrease in 2 to 3 weeks after you have finished radiation.  Some effects take longer to resolve.    Fatigue caused by radiation therapy will decrease and your energy will improve.    Skin reactions:  Skin changes (such as redness or irritation) will slowly begin to get better. Some people may have a permanent change in skin color.  Their skin may be more pink or  tan  than the untreated skin. The skin may be thinner or more fragile than before treatment.  Continue to use a gentle moisturizing lotion for several months.  You should always protect the skin in the area that was treated by using sunscreen of SPF30 or higher.      Other skin care instructions: continue with twice daily skin cares using Aquaphor for the next one month.        For concerns or questions call [facility] [phone]          Follow-ups after your visit        Your next 10 appointments already scheduled     Oct 02, 2018  9:15 AM CDT   TREATMENT with RADIATION THERAPIST   [facility]    [address] [place] [address]   [phone]            Oct 05, 2018  3:30 PM CDT   Return Visit with Chastity eBe MD   [facility])    [address] [place] [address]   [phone]            Nov 02, 2018  8:30 AM CDT   Return Visit with Melvin Jones MD   [facility]  [facility])    [address] [place] [address] [phone]              Who to contact     If you have questions or need follow up information about today's clinic visit or your schedule please contact [facility] directly at [phone].  Normal or non-critical lab and imaging results will be communicated to you by MyChart, letter or phone within 4 business days after the clinic has received the results. If you do not hear from us within 7 days, please contact the clinic through MyChart or phone. If you have a critical or abnormal lab result, we will notify you by phone as soon as possible.  Submit refill requests through [employer] or call your pharmacy and they will forward the refill request to us. Please allow 3 business days for your refill to be completed.          Additional Information About Your Visit        Care EveryWhere ID     This is your Care EveryWhere ID. This could be used by other organizations to access your [place] medical records  [id number]        Your Vitals Were     Pulse Respirations Pulse Oximetry BMI (Body Mass Index)          50 16 99% 19.87 kg/m2         Blood Pressure from Last 3 Encounters:   10/01/18 105/55   09/24/18 144/64   09/17/18 147/70    Weight from Last 3 Encounters:   10/01/18 120 lb   09/24/18 120 lb   09/17/18 120 lb              Today, you had the following     No orders found for display       Primary Care Provider Office Phone # Fax #    [facility] [phone] [address] [facility] [address]        Equal Access to Services     CARLA GIBSON AH: Hadii aad ku hadasho Soomaali, waaxda luqadaha, qaybta kaalmada adeegyada, rody gale. So [facility] [phone].    ATENCIÓN: Si habla español, tiene a yoon disposición servicios gratuitos de asistencia lingüística. Llame al [phone].    We comply with applicable federal civil rights laws and [place] laws.  We do not discriminate on the basis of race, color, national origin, age, disability, sex, sexual orientation, or gender identity.            Thank you!     Thank you for choosing [facility]  for your care. Our goal is always to provide you with excellent care. Hearing back from our patients is one way we can continue to improve our services. Please take a few minutes to complete the written survey that you may receive in the mail after your visit with us. Thank you!             Your Updated Medication List - Protect others around you: Learn how to safely use, store and throw away your medicines at www.disposemymeds.org.          This list is accurate as of 10/1/18  8:52 AM.  Always use your most recent med list.                   Brand Name Dispense Instructions for use Diagnosis    anastrozole 1 MG tablet    ARIMIDEX    90 tablet    Take 1 tablet (1 mg) by mouth daily    Invasive ductal carcinoma of left breast (H), Other osteoporosis without current pathological fracture, Aromatase inhibitor use       ASPIRIN PO      Take 81 mg by mouth daily    Invasive ductal carcinoma of left breast (H), Other osteoporosis without current pathological fracture, Aromatase inhibitor use       CALCIUM PO      Take 1,000 mg by mouth daily        FLUoxetine 10 MG capsule    PROzac     Take 10 mg by mouth daily    Invasive ductal carcinoma of left breast (H), Other osteoporosis without current pathological fracture, Aromatase inhibitor use       LIPITOR PO      Take 40 mg by mouth daily.        LISINOPRIL PO      Take 20 mg by mouth daily.        metoprolol tartrate 25 MG tablet    LOPRESSOR     Take 25 mg by mouth daily        PLAVIX PO      Take 75 mg by mouth daily.        VITAMIN D-3 PO      Take 2,000 mg by mouth

## 2018-10-01 NOTE — PATIENT INSTRUCTIONS
Managing radiation side effects after treatment has ended    The side effects of radiation therapy should gradually decrease in 2 to 3 weeks after you have finished radiation.  Some effects take longer to resolve.    Fatigue caused by radiation therapy will decrease and your energy will improve.    Skin reactions:  Skin changes (such as redness or irritation) will slowly begin to get better. Some people may have a permanent change in skin color.  Their skin may be more pink or  tan  than the untreated skin. The skin may be thinner or more fragile than before treatment.  Continue to use a gentle moisturizing lotion for several months.  You should always protect the skin in the area that was treated by using sunscreen of SPF30 or higher.      Other skin care instructions: continue with twice daily skin cares using Aquaphor for the next one month.        For concerns or questions call [facility] [phone]

## 2018-10-01 NOTE — PROGRESS NOTES
[facility] PHYSICIANS  SPECIALIZING IN BREAKTHROUGHS  Radiation Oncology    On Treatment Visit Note      Radha Patel      Date: 10/1/2018   MRN: [id number]   : 1945  Diagnosis: breast cancer      Reason for Visit:  On Radiation Treatment Visit     Treatment Summary to Date  Treatment Site: left breast + LNs, left SCV, left axillary boost Current Dose: 5800/6000, 5000/5000, 800/1000 cGy Fractions: , , /5      Chemotherapy  Chemo concurrent with radx?: No (Chemo completed 2018 - Dr. Bee)    Subjective:   Completes RT Tomorrow; tolerating well. Darkening skin in axilla. No pain. Energy level stable.    Nursing ROS:   Nutrition Alteration  Diet Type: Patient's Preference  Skin  Skin Reaction: 2 - Moderate to brisk erythema, patchy moist desquamation, mostly confined to skin folds and creases, moderate edema (no desquamation, hyperpigmentation noted)  Skin Intervention: Aquaphor twice daily        Cardiovascular  Respiratory effort: 1 - Normal - without distress        Psychosocial  Pyschosocial Note: denies fatigue  Pain Assessment  0-10 Pain Scale: 0      Objective:   /55 (BP Location: Right arm, Patient Position: Chair, Cuff Size: Adult Regular)  Pulse 50  Resp 16  Wt 120 lb  SpO2 99%  BMI 19.87 kg/m2  NAD  Hyperpigmentation in L axilla w/o desquamation    Labs:  CBC RESULTS:   Recent Labs   Lab Test  18   1409   WBC  7.2   RBC  4.12   HGB  12.7   HCT  38.2   MCV  93   MCH  30.8   MCHC  33.2   RDW  12.8   PLT  183     ELECTROLYTES:  Recent Labs   Lab Test  18   1409   NA  143   POTASSIUM  3.9   CHLORIDE  109   BERTIN  9.6   CO2  28   BUN  12   CR  0.77   GLC  102*       Assessment:    Tolerating radiation therapy well.  All questions and concerns addressed.    Plan:   1. Continue current therapy, completes tomorrow. Cont aquaphor on skin. F/u in 1 mo.      Mosaiq chart and setup information reviewed  Ports checked    Medication Review  Med list reviewed  with patient?: Yes  Med list printed and given: Offered and declined    Educational Topic Discussed  Additional Instructions: follow-up with Dr. Bee scheduled 10/5/2018 and follow-up with Dr. Jones on 11/2/2018  Education Instructions: reviewed continued skin cares with use of Aquaphor two times daily for one month      Melvin Jones MD

## 2018-10-02 ENCOUNTER — APPOINTMENT (OUTPATIENT)
Dept: RADIATION ONCOLOGY | Facility: CLINIC | Age: 73
End: 2018-10-02
Payer: COMMERCIAL

## 2018-10-02 PROCEDURE — 77336 RADIATION PHYSICS CONSULT: CPT | Performed by: RADIOLOGY

## 2018-10-02 PROCEDURE — G6012 RADIATION TREATMENT DELIVERY: HCPCS | Performed by: RADIOLOGY

## 2018-10-02 PROCEDURE — 77427 RADIATION TX MANAGEMENT X5: CPT | Performed by: RADIOLOGY

## 2018-10-03 ENCOUNTER — ONCOLOGY VISIT (OUTPATIENT)
Dept: RADIATION ONCOLOGY | Facility: CLINIC | Age: 73
End: 2018-10-03
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
## 2018-10-04 NOTE — PROCEDURES
Radiotherapy Treatment Summary          Date of Report: 2018     PATIENT: SHAN RECINOS  MEDICAL RECORD NO: [id number]  : 1945     DIAGNOSIS: C50.412 Malignant neoplasm of upper-outer quadrant of left female breast  INTENT OF RADIOTHERAPY: Cure  PATHOLOGY: invasive ductal carcinoma of breast, ER/LA+, HER2-                                 STAGE:  pT2N1a                         Details of the treatments summarized below are found in records kept in the Department of Radiation Oncology at [facility].     Treatment Summary:  Radiation Oncology - Course: 1    Treatment Site Current Dose Modality From  To Elapsed Days Fx.  1Lt DIBH Breast + LN  5,000 cGy 6X/10X   8/20/2018  2018  36 25  2Lt DIBH SCV   5,000 cGy 6X/18X   8/20/2018  2018  36 25  1Lt Breast Bst   1,000 cGy e09   2018 10/02/2018   6  5  2Lt Axilla Bst   1,000 cGy 10 X   9/26/2018 10/02/2018   6  5                Dose per Fraction:  200 cGy      Total Dose:      5000 cGy to L breast and LN, with 1000   cGy boost to surgical cavity and suspicious axillary LN        COMMENTS:                      Patient experienced grade 2 skin reaction managed well with moisturizer.  Patient experienced mild fatigue   related to radiation therapy.     PAIN MANAGEMENT:      Patient did not require any pain medications.                          FOLLOW UP PLAN:  Patient will follow up in Radiation Oncology in 1 month or sooner if any concerns..                            Staff Physician: Melvin Jones M.D.  Physicist: Isidra Harris MS     CC:   MD Dr. Linette Saucedo           Radiation Oncology [address] [place] [address] Phone: [phone]

## 2018-10-05 ENCOUNTER — ONCOLOGY VISIT (OUTPATIENT)
Dept: ONCOLOGY | Facility: CLINIC | Age: 73
End: 2018-10-05
Payer: COMMERCIAL

## 2018-10-05 ENCOUNTER — TELEPHONE (OUTPATIENT)
Dept: RADIATION ONCOLOGY | Facility: CLINIC | Age: 73
End: 2018-10-05

## 2018-10-05 VITALS
SYSTOLIC BLOOD PRESSURE: 157 MMHG | BODY MASS INDEX: 20.1 KG/M2 | HEART RATE: 57 BPM | OXYGEN SATURATION: 97 % | DIASTOLIC BLOOD PRESSURE: 103 MMHG | TEMPERATURE: 97.6 F | WEIGHT: 121.4 LBS

## 2018-10-05 DIAGNOSIS — Z78.0 POSTMENOPAUSAL STATUS: ICD-10-CM

## 2018-10-05 DIAGNOSIS — Z79.811 AROMATASE INHIBITOR USE: ICD-10-CM

## 2018-10-05 DIAGNOSIS — Z12.31 VISIT FOR SCREENING MAMMOGRAM: ICD-10-CM

## 2018-10-05 DIAGNOSIS — C50.912 INVASIVE DUCTAL CARCINOMA OF LEFT BREAST (H): Primary | ICD-10-CM

## 2018-10-05 PROCEDURE — 99214 OFFICE O/P EST MOD 30 MIN: CPT | Performed by: INTERNAL MEDICINE

## 2018-10-05 RX ORDER — ALBUTEROL SULFATE 0.83 MG/ML
2.5 SOLUTION RESPIRATORY (INHALATION)
Status: CANCELLED | OUTPATIENT
Start: 2019-02-15

## 2018-10-05 RX ORDER — EPINEPHRINE 0.3 MG/.3ML
0.3 INJECTION SUBCUTANEOUS EVERY 5 MIN PRN
Status: CANCELLED | OUTPATIENT
Start: 2019-02-15

## 2018-10-05 RX ORDER — MEPERIDINE HYDROCHLORIDE 25 MG/ML
25 INJECTION INTRAMUSCULAR; INTRAVENOUS; SUBCUTANEOUS EVERY 30 MIN PRN
Status: CANCELLED | OUTPATIENT
Start: 2019-02-15

## 2018-10-05 RX ORDER — METHYLPREDNISOLONE SODIUM SUCCINATE 125 MG/2ML
125 INJECTION, POWDER, LYOPHILIZED, FOR SOLUTION INTRAMUSCULAR; INTRAVENOUS
Status: CANCELLED
Start: 2019-02-15

## 2018-10-05 RX ORDER — SODIUM CHLORIDE 9 MG/ML
1000 INJECTION, SOLUTION INTRAVENOUS CONTINUOUS PRN
Status: CANCELLED
Start: 2019-02-15

## 2018-10-05 RX ORDER — ALBUTEROL SULFATE 90 UG/1
1-2 AEROSOL, METERED RESPIRATORY (INHALATION)
Status: CANCELLED
Start: 2019-02-15

## 2018-10-05 RX ORDER — EPINEPHRINE 1 MG/ML
0.3 INJECTION, SOLUTION INTRAMUSCULAR; SUBCUTANEOUS EVERY 5 MIN PRN
Status: CANCELLED | OUTPATIENT
Start: 2019-02-15

## 2018-10-05 RX ORDER — DIPHENHYDRAMINE HYDROCHLORIDE 50 MG/ML
50 INJECTION INTRAMUSCULAR; INTRAVENOUS
Status: CANCELLED
Start: 2019-02-15

## 2018-10-05 ASSESSMENT — PAIN SCALES - GENERAL: PAINLEVEL: NO PAIN (0)

## 2018-10-05 NOTE — MR AVS SNAPSHOT
After Visit Summary   10/5/2018    Radha Patel    MRN: [id number]           Patient Information     Date Of Birth          1945        Visit Information        Provider Department      10/5/2018 3:30 PM Chastity Bee MD [facility]        Today's Diagnoses     Invasive ductal carcinoma of left breast (H)    -  1    Visit for screening mammogram           Follow-ups after your visit        Your next 10 appointments already scheduled     Nov 02, 2018  8:30 AM CDT   Return Visit with Melvin Joens MD   [facility] ([facility])    [address] [place] [address] [id number] [phone]            Feb 15, 2019  2:15 PM CST   MA SCREENING BILATERAL W/ KEV with MGMA2, MG MA TECH   [facility] ([facility])    [address] [place] [address] [id number] [phone]           How do I prepare for my exam? (Food and drink instructions) No Food and Drink Restrictions.  How do I prepare for my exam? (Other instructions) Do not use any powder, lotion or deodorant under your arms or on your breast. If you do, we will ask you to remove it before your exam.  What should I wear: Wear comfortable, two-piece clothing.  How long does the exam take: Most scans will take 15 minutes.  What should I bring: Bring any previous mammograms from other facilities or have them mailed to the breast center.  Do I need a :  No  is needed.  What do I need to tell my doctor: If you have any allergies, tell your care team.  What should I do after the exam: No restrictions, You may resume normal activities.  What is this test: This test is an x-ray of the breast to look for breast disease. The breast is pressed between two plates to flatten and spread the tissue. An X-ray is taken of the breast from different angles.  Who should I call with questions: If you have any questions, please call the Imaging Department  "where you will have your exam. Directions, parking instructions, and other information is available on our website, [email or web address].  Other information about my exam Three-dimensional (3D) mammograms are available at [facility] locations in [facility], [facility], [place], and [place]. [facility] locations include [place] and the [facility] in [place].  Benefits of 3D mammograms include: * Improved rate of cancer detection * Decreases your chance of having to go back for more tests, which means fewer: * \"False-positive\" results (This means that there is an abnormal area but it isn't cancer.) * Invasive testing procedures, such as a biopsy or surgery * Can provide clearer images of the breast if you have dense breast tissue.  *3D mammography is an optional exam that anyone can have with a 2D mammogram. It doesn't replace or take the place of a 2D mammogram. 2D mammograms remain an effective screening test for all women.  Not all insurance companies cover the cost of a 3D mammogram. Check with your insurance.            Feb 15, 2019  2:45 PM CST   LAB with LAB ONC [facility] ([facility])    [address] [place] [phone]           Please do not eat 10-12 hours before your appointment if you are coming in fasting for labs on lipids, cholesterol, or glucose (sugar). This does not apply to pregnant women. Water, hot tea and black coffee (with nothing added) are okay. Do not drink other fluids, diet soda or chew gum.            Feb 15, 2019  3:30 PM CST   Return Visit with Chastity Bee MD   [facility] ([facility])    [address] [place] [address] [phone]            Feb 15, 2019  4:00 PM CST   Level O with [address])    [address] [place] [address] " [id number]   [phone]              Future tests that were ordered for you today     Open Future Orders        Priority Expected Expires Ordered    Comprehensive metabolic panel Routine  10/5/2019 10/5/2018    MA Screen Bilateral w/William Routine  10/5/2019 10/5/2018            Who to contact     If you have questions or need follow up information about today's clinic visit or your schedule please contact [facility] directly at [phone].  Normal or non-critical lab and imaging results will be communicated to you by MyChart, letter or phone within 4 business days after the clinic has received the results. If you do not hear from us within 7 days, please contact the clinic through MyChart or phone. If you have a critical or abnormal lab result, we will notify you by phone as soon as possible.  Submit refill requests through [email or web address] or call your pharmacy and they will forward the refill request to us. Please allow 3 business days for your refill to be completed.          Additional Information About Your Visit        Care EveryWhere ID     This is your Care EveryWhere ID. This could be used by other organizations to access your [facility] medical records  [id number]        Your Vitals Were     Pulse Temperature Pulse Oximetry BMI (Body Mass Index)          57 97.6  F (36.4  C) (Oral) 97% 20.1 kg/m2         Blood Pressure from Last 3 Encounters:   10/05/18 (!) 157/103   10/01/18 105/55   09/24/18 144/64    Weight from Last 3 Encounters:   10/05/18 55.1 kg (121 lb 6.4 oz)   10/01/18 54.4 kg (120 lb)   09/24/18 54.4 kg (120 lb)               Primary Care Provider Office Phone # Fax #    [facility] [phone] [address] [facility] [address]        Equal Access to Services     CÉSAR GIBSON : Tony Justin, waeunice greenberg, qaybta kaalmarody bosch. So [facility]  [phone].    ATENCIÓN: Si miguelito denton, tiene a yoon disposición servicios gratuitos de asistencia lingüística. Tom reddy [phone].    We comply with applicable federal civil rights laws and [place] laws. We do not discriminate on the basis of race, color, national origin, age, disability, sex, sexual orientation, or gender identity.            Thank you!     Thank you for choosing [facility]  for your care. Our goal is always to provide you with excellent care. Hearing back from our patients is one way we can continue to improve our services. Please take a few minutes to complete the written survey that you may receive in the mail after your visit with us. Thank you!             Your Updated Medication List - Protect others around you: Learn how to safely use, store and throw away your medicines at www.disposemymeds.org.          This list is accurate as of 10/5/18  4:28 PM.  Always use your most recent med list.                   Brand Name Dispense Instructions for use Diagnosis    anastrozole 1 MG tablet    ARIMIDEX    90 tablet    Take 1 tablet (1 mg) by mouth daily    Invasive ductal carcinoma of left breast (H), Other osteoporosis without current pathological fracture, Aromatase inhibitor use       ASPIRIN PO      Take 81 mg by mouth daily    Invasive ductal carcinoma of left breast (H), Other osteoporosis without current pathological fracture, Aromatase inhibitor use       CALCIUM PO      Take 1,000 mg by mouth daily        FLUoxetine 10 MG capsule    PROzac     Take 10 mg by mouth daily    Invasive ductal carcinoma of left breast (H), Other osteoporosis without current pathological fracture, Aromatase inhibitor use       LIPITOR PO      Take 40 mg by mouth daily.        LISINOPRIL PO      Take 20 mg by mouth daily.        metoprolol tartrate 25 MG tablet    LOPRESSOR     Take 25 mg by mouth daily        PLAVIX PO      Take 75 mg by mouth daily.        VITAMIN D-3 PO      Take 2,000  mg by mouth
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
## 2018-10-05 NOTE — NURSING NOTE
"Oncology Rooming Note    October 5, 2018 3:41 PM   Radha Patel is a 72 year old female who presents for:    Chief Complaint   Patient presents with     Oncology Clinic Visit     Return 8 week f/u     Initial Vitals: BP (!) 157/103  Pulse 57  Temp 97.6  F (36.4  C) (Oral)  Wt 55.1 kg (121 lb 6.4 oz)  SpO2 97%  BMI 20.1 kg/m2 Estimated body mass index is 20.1 kg/(m^2) as calculated from the following:    Height as of 8/10/18: 1.655 m (5' 5.16\").    Weight as of this encounter: 55.1 kg (121 lb 6.4 oz). Body surface area is 1.59 meters squared.  No Pain (0) Comment: Data Unavailable   No LMP recorded. Patient has had a hysterectomy.  Allergies reviewed: Yes  Medications reviewed: Yes    Medications: Medication refills not needed today.  Pharmacy name entered into b-datum:    [facility] - [place], [place] - [address] [facility] [place] [address] [facility] [address]    Clinical concerns: worried about what MD will tell her Dr. Bee was notified.    5 minutes for nursing intake (face to face time)     Kashmir Frost RN              "

## 2018-10-18 ENCOUNTER — TELEPHONE (OUTPATIENT)
Dept: RADIATION ONCOLOGY | Facility: CLINIC | Age: 73
End: 2018-10-18

## 2018-10-18 NOTE — TELEPHONE ENCOUNTER
I left a voicemail for Bridgette on 10/18/2018 at 1500, I am attempting to contact patient to reschedule appointment on 11/02/2018 at 0830 with Dr. Jones at [facility].  Dr. Jones will not be available that time/day and has asked that I contact the patient to reschedule.      Evelyn Brandt MA

## 2018-10-19 ENCOUNTER — TELEPHONE (OUTPATIENT)
Dept: RADIATION ONCOLOGY | Facility: CLINIC | Age: 73
End: 2018-10-19

## 2018-10-19 NOTE — TELEPHONE ENCOUNTER
Patient contacted to reschedule appointment for 11/02/2018 at 0830.  Patient selected a new appointment date and time.  Scheduled new appointment with Dr. Jones on 11/09/2018 at 1500 at [facility].  Patient confirmed appointment date and time and had no questions at this time.      Evelyn Brandt MA

## 2018-11-02 ENCOUNTER — TELEPHONE (OUTPATIENT)
Dept: RADIATION ONCOLOGY | Facility: CLINIC | Age: 73
End: 2018-11-02

## 2018-11-02 NOTE — TELEPHONE ENCOUNTER
Attempted to contact patient for a reminder call regarding their appointment with Dr. Jones on 11/09/2018 at 1500 at [facility].  Voicemail was left with appointment time, date and contact information for rescheduling if needed.  Requested that patient return call to confirm appointment date and time to [phone].        Evelyn Brandt MA

## 2018-11-09 ENCOUNTER — TELEPHONE (OUTPATIENT)
Dept: RADIATION ONCOLOGY | Facility: CLINIC | Age: 73
End: 2018-11-09

## 2018-11-09 ENCOUNTER — OFFICE VISIT (OUTPATIENT)
Dept: RADIATION ONCOLOGY | Facility: CLINIC | Age: 73
End: 2018-11-09
Payer: COMMERCIAL

## 2018-11-09 VITALS
WEIGHT: 121.4 LBS | BODY MASS INDEX: 20.1 KG/M2 | RESPIRATION RATE: 18 BRPM | HEART RATE: 58 BPM | DIASTOLIC BLOOD PRESSURE: 67 MMHG | SYSTOLIC BLOOD PRESSURE: 137 MMHG

## 2018-11-09 DIAGNOSIS — C50.912 INVASIVE DUCTAL CARCINOMA OF LEFT BREAST (H): Primary | ICD-10-CM

## 2018-11-09 PROCEDURE — 99024 POSTOP FOLLOW-UP VISIT: CPT | Performed by: RADIOLOGY

## 2018-11-09 RX ORDER — AMLODIPINE BESYLATE 5 MG/1
5 TABLET ORAL
COMMUNITY
Start: 2018-11-09 | End: 2020-03-26

## 2018-11-09 ASSESSMENT — PAIN SCALES - GENERAL: PAINLEVEL: NO PAIN (0)
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
## 2018-11-09 NOTE — NURSING NOTE
FOLLOW-UP VISIT    Patient Name: Radha Patel      : 1945     Age: 73 year old        ______________________________________________________________________________     Chief Complaint   Patient presents with     Cancer     Radiation Oncology return visit with Dr. Jones     /67 (BP Location: Left arm, Patient Position: Sitting, Cuff Size: Adult Regular)  Pulse 58  Resp 18  Wt 121 lb 6.4 oz  BMI 20.1 kg/m2     Date Radiation Completed: 6,000 cGy to left breast + LNs completed on 10/02/2018.    Pain  Denies    Labs  Other Labs: Yes: 10/05/2018    Imaging  Mammogram: 10/05/2018        Other Appointments:     MD Name: Dr. Bee Appointment Date: 02/15/2019   MD Name: Dr. Jones Appointment Date: 2019   MD Name: Appointment Date:   Other Appointment Notes: CT scan 12/10/2018           Nurse face-to-face time: Level 2:  5 min face to face time

## 2018-11-09 NOTE — TELEPHONE ENCOUNTER
I left a voicemail for patient to reschedule her missed appointment on 11/09/2018 at 1500 with Dr. Jones.      Evelyn Brandt MA

## 2018-11-09 NOTE — PATIENT INSTRUCTIONS
Please contact [facility] RN with questions or concerns following today's appointment: [phone].    Thank you!

## 2018-11-09 NOTE — MR AVS SNAPSHOT
After Visit Summary   11/9/2018    Radha Patel    MRN: [id number]           Patient Information     Date Of Birth          1945        Visit Information        Provider Department      11/9/2018 3:00 PM Melvin Jones MD [facility]        Today's Diagnoses     Invasive ductal carcinoma of left breast (H)    -  1      Care Instructions    Please contact [facility] RN with questions or concerns following today's appointment: [phone].    Thank you!            Follow-ups after your visit        Your next 10 appointments already scheduled     Dec 10, 2018  9:15 AM CST   CT CHEST W/O & W CONTRAST with MGCT1   [facility] ([facility])    [address] [place] [id number] [phone]           How do I prepare for my exam? (Food and drink instructions) **You will have contrast for this exam.** Do not eat or drink for 2 hours before your exam. If you need to take medicine, you may take it with small sips of water. (We may ask you to take liquid medicine as well.)  The day before your exam, drink extra fluids at least six 8-ounce glasses (unless your doctor tells you to restrict your fluids).  How do I prepare for my exam? (Other instructions) Patients over 70 or patients with diabetes or kidney problems: If you haven t had a blood test (creatinine test) within the last 30 days, the Cardiologist/Radiologist may require you to get this test prior to your exam.  What should I wear: Please wear loose clothing, such as a sweat suit or jogging clothes.  Avoid snaps, zippers and other metal. We may ask you to undress and put on a hospital gown.  How long does the exam take: Most scans take less than 20 minutes.  What should I bring: Please bring any scans or X-rays taken at other hospitals, if similar tests were done. Also bring a list of your medicines, including vitamins, minerals and over-the-counter drugs. It  is safest to leave personal items at home.  Do I need a :  No  is needed.  What do I need to tell my doctor? Be sure to tell your doctor: * If you have any allergies. * If there s any chance you are pregnant. * If you are breastfeeding. * If you have diabetes as your medication may need to be adjusted for this exam.  What should I do after the exam: No restrictions, You may resume normal activities.  What is this test: A CT (computed tomography) scan is a series of pictures that allows us to look inside your body. The scanner creates images of the body in cross sections, much like slices of bread. This helps us see any problems more clearly. You may receive contrast (X-ray dye) before or during your scan. Contrast is given through an IV (small needle in your arm).  Who should I call with questions: If you have any questions, please call the Imaging Department where you will have your exam. Directions, parking instructions, and other information is available on our website, [email or web address].            Feb 08, 2019  3:00 PM CST   [facility] Visit with Melvin Jones MD   [facility]    [address] [place] [id number] [phone]            Feb 15, 2019  2:15 PM CST   MA SCREENING BILATERAL W/ KEV with MGMA2, MG MA TECH   [facility] ([facility])    [address] [place] [id number] [phone]           How do I prepare for my exam? (Food and drink instructions) No Food and Drink Restrictions.  How do I prepare for my exam? (Other instructions) Do not use any powder, lotion or deodorant under your arms or on your breast. If you do, we will ask you to remove it before your exam.  What should I wear: Wear comfortable, two-piece clothing.  How long does the exam take: Most scans will take 15 minutes.  What should I bring: Bring any previous mammograms from other facilities or have them  "mailed to the breast center.  Do I need a :  No  is needed.  What do I need to tell my doctor: If you have any allergies, tell your care team.  What should I do after the exam: No restrictions, You may resume normal activities.  What is this test: This test is an x-ray of the breast to look for breast disease. The breast is pressed between two plates to flatten and spread the tissue. An X-ray is taken of the breast from different angles.  Who should I call with questions: If you have any questions, please call the Imaging Department where you will have your exam. Directions, parking instructions, and other information is available on our website, [email or web address].  Other information about my exam Three-dimensional (3D) mammograms are available at [facility] locations in [facility], [place], and [place]. [facility] locations include [place] and the [facility] and [facility] Center in [place].  Benefits of 3D mammograms include: * Improved rate of cancer detection * Decreases your chance of having to go back for more tests, which means fewer: * \"False-positive\" results (This means that there is an abnormal area but it isn't cancer.) * Invasive testing procedures, such as a biopsy or surgery * Can provide clearer images of the breast if you have dense breast tissue.  *3D mammography is an optional exam that anyone can have with a 2D mammogram. It doesn't replace or take the place of a 2D mammogram. 2D mammograms remain an effective screening test for all women.  Not all insurance companies cover the cost of a 3D mammogram. Check with your insurance.            Feb 15, 2019  2:45 PM CST   LAB with LAB ONC [facility] ([facility])    [address] [place] [address] [phone]           Please do not eat 10-12 hours before your appointment if you are coming in fasting for labs on lipids, cholesterol, or " glucose (sugar). This does not apply to pregnant women. Water, hot tea and black coffee (with nothing added) are okay. Do not drink other fluids, diet soda or chew gum.            Feb 15, 2019  3:30 PM CST   Return Visit with Chastity Bee MD   [facility] ([facility]    [id number] [facility] [address] [phone]            Feb 15, 2019  4:00 PM CST   Level O with BAY 9 INFUSION   [facility] ([facility])    [id number] [facility] [address] [phone]              Future tests that were ordered for you today     Open Future Orders        Priority Expected Expires Ordered    CT Chest w/o & w Contrast Routine 12/6/2018 11/9/2019 11/9/2018            Who to contact     If you have questions or need follow up information about today's clinic visit or your schedule please contact [facility] directly at [phone].  Normal or non-critical lab and imaging results will be communicated to you by MyChart, letter or phone within 4 business days after the clinic has received the results. If you do not hear from us within 7 days, please contact the clinic through MyChart or phone. If you have a critical or abnormal lab result, we will notify you by phone as soon as possible.  Submit refill requests through [employer] or call your pharmacy and they will forward the refill request to us. Please allow 3 business days for your refill to be completed.          Additional Information About Your Visit        Care EveryWhere ID     This is your Care EveryWhere ID. This could be used by other organizations to access your [facility] medical records  [id number]        Your Vitals Were     Pulse Respirations BMI (Body Mass Index)             58 18 20.1 kg/m2          Blood Pressure from Last 3 Encounters:   11/09/18 137/67   10/05/18 (!) 157/103   10/01/18 105/55    Weight from Last 3 Encounters:   11/09/18 121 lb  6.4 oz   10/05/18 121 lb 6.4 oz   10/01/18 120 lb               Primary Care Provider Office Phone # Fax #    [facility] [phone] [address] [facility] [address]        Equal Access to Services     CÉSAR GIBSON : Hadii aad ku hadasho Soomaali, waaxda luqadaha, qaybta kaalmada adeegyada, waxay lesiain hayjose antonion adevannessa dharabarney gale. So [facility] [phone].    ATENCIÓN: Si habla español, tiene a yoon disposición servicios gratuitos de asistencia lingüística. Rachelame al [phone].    We comply with applicable federal civil rights laws and [place] laws. We do not discriminate on the basis of race, color, national origin, age, disability, sex, sexual orientation, or gender identity.            Thank you!     Thank you for choosing [facility]  for your care. Our goal is always to provide you with excellent care. Hearing back from our patients is one way we can continue to improve our services. Please take a few minutes to complete the written survey that you may receive in the mail after your visit with us. Thank you!             Your Updated Medication List - Protect others around you: Learn how to safely use, store and throw away your medicines at www.disposemymeds.org.          This list is accurate as of 11/9/18  4:15 PM.  Always use your most recent med list.                   Brand Name Dispense Instructions for use Diagnosis    amLODIPine 5 MG tablet    NORVASC     Take 5 mg by mouth        anastrozole 1 MG tablet    ARIMIDEX    90 tablet    Take 1 tablet (1 mg) by mouth daily    Invasive ductal carcinoma of left breast (H), Other osteoporosis without current pathological fracture, Aromatase inhibitor use       ASPIRIN PO      Take 81 mg by mouth daily    Invasive ductal carcinoma of left breast (H), Other osteoporosis without current pathological fracture, Aromatase inhibitor use       CALCIUM PO      Take 1,000 mg by mouth daily         FLUoxetine 10 MG capsule    PROzac     Take 10 mg by mouth daily    Invasive ductal carcinoma of left breast (H), Other osteoporosis without current pathological fracture, Aromatase inhibitor use       LIPITOR PO      Take 80 mg by mouth daily        LISINOPRIL PO      Take 20 mg by mouth daily.        metoprolol tartrate 25 MG tablet    LOPRESSOR     Take 25 mg by mouth daily        PLAVIX PO      Take 75 mg by mouth daily.        VITAMIN D-3 PO      Take 2,000 mg by mouth

## 2018-12-10 ENCOUNTER — ANCILLARY PROCEDURE (OUTPATIENT)
Dept: CT IMAGING | Facility: CLINIC | Age: 73
End: 2018-12-10
Attending: RADIOLOGY
Payer: COMMERCIAL

## 2018-12-10 DIAGNOSIS — C50.912 INVASIVE DUCTAL CARCINOMA OF LEFT BREAST (H): ICD-10-CM

## 2018-12-10 LAB
CREAT BLD-MCNC: 0.8 MG/DL (ref 0.52–1.04)
GFR SERPL CREATININE-BSD FRML MDRD: 70 ML/MIN/1.7M2

## 2018-12-10 PROCEDURE — 82565 ASSAY OF CREATININE: CPT | Performed by: RADIOLOGY

## 2018-12-10 PROCEDURE — 71260 CT THORAX DX C+: CPT | Performed by: RADIOLOGY

## 2018-12-10 RX ORDER — IOPAMIDOL 755 MG/ML
89 INJECTION, SOLUTION INTRAVASCULAR ONCE
Status: DISCONTINUED | OUTPATIENT
Start: 2018-12-10 | End: 2018-12-10 | Stop reason: CLARIF

## 2018-12-10 RX ORDER — IOPAMIDOL 755 MG/ML
59 INJECTION, SOLUTION INTRAVASCULAR ONCE
Status: COMPLETED | OUTPATIENT
Start: 2018-12-10 | End: 2018-12-10

## 2018-12-10 RX ADMIN — IOPAMIDOL 59 ML: 755 INJECTION, SOLUTION INTRAVASCULAR at 09:46

## 2018-12-11 DIAGNOSIS — C50.912 INVASIVE DUCTAL CARCINOMA OF LEFT BREAST (H): Primary | ICD-10-CM

## 2018-12-11 PROCEDURE — 99024 POSTOP FOLLOW-UP VISIT: CPT | Performed by: RADIOLOGY

## 2018-12-12 ENCOUNTER — TELEPHONE (OUTPATIENT)
Dept: RADIATION ONCOLOGY | Facility: CLINIC | Age: 73
End: 2018-12-12

## 2018-12-12 NOTE — TELEPHONE ENCOUNTER
Patient's sister Fabiola contacted and informed regarding the following appointments:  03/11/2018 1145 CT Chest   03/15/2018 1400 Follow up with Dr. Jones   Both appointments located at [facility] in [place]    Fabiola repeated information back to me and had no further questions at this time    Evelyn Rikki BYRNES          ----- Message from Adele Reza RN sent at 12/11/2018  1:56 PM CST -----  Regarding: FW: ct  Hi Evelyn,    Could you call and have a CT chest scheduled for patient approximately Monday, March 11th and then change her February follow-up with Dr. Jones to Friday, March 15th?    I spoke with her sister, Laurita and she knows this will be done.  If you could call her with updated appointment information, that would be great.    The patient is off on Monday and Fridays, that is why I am trying to coordinate as noted.    Thank you!  Adele    ----- Message -----  From: Melvin Jones MD  Sent: 12/11/2018   1:27 PM  To: Adele Reza RN  Subject: ct                                               Telma Angulo, can you please schedule a follow up chest CT for atif carr in 3 mos, and let her know the appt? thx

## 2019-02-12 ENCOUNTER — TELEPHONE (OUTPATIENT)
Dept: RADIATION ONCOLOGY | Facility: CLINIC | Age: 74
End: 2019-02-12

## 2019-02-12 NOTE — TELEPHONE ENCOUNTER
I left a voicemail for patient to call me back.  I would like to see if she can come in at 1200 on 03/15/2019, in place of 1400 on 03/15/2019.  I need to let her know that Dr. Jones will not be able to see her that day and it will need to be changed to Dr. Boateng.        Evelyn Brandt MA

## 2019-02-15 ENCOUNTER — INFUSION THERAPY VISIT (OUTPATIENT)
Dept: INFUSION THERAPY | Facility: CLINIC | Age: 74
End: 2019-02-15
Payer: MEDICARE

## 2019-02-15 ENCOUNTER — ONCOLOGY VISIT (OUTPATIENT)
Dept: ONCOLOGY | Facility: CLINIC | Age: 74
End: 2019-02-15
Payer: MEDICARE

## 2019-02-15 ENCOUNTER — TELEPHONE (OUTPATIENT)
Dept: RADIATION ONCOLOGY | Facility: CLINIC | Age: 74
End: 2019-02-15

## 2019-02-15 ENCOUNTER — ANCILLARY PROCEDURE (OUTPATIENT)
Dept: MAMMOGRAPHY | Facility: CLINIC | Age: 74
End: 2019-02-15
Attending: INTERNAL MEDICINE
Payer: MEDICARE

## 2019-02-15 VITALS
TEMPERATURE: 98.1 F | BODY MASS INDEX: 20.26 KG/M2 | SYSTOLIC BLOOD PRESSURE: 135 MMHG | DIASTOLIC BLOOD PRESSURE: 67 MMHG | WEIGHT: 121.63 LBS | HEIGHT: 65 IN | OXYGEN SATURATION: 99 % | RESPIRATION RATE: 18 BRPM | HEART RATE: 63 BPM

## 2019-02-15 DIAGNOSIS — C50.912 INVASIVE DUCTAL CARCINOMA OF LEFT BREAST (H): ICD-10-CM

## 2019-02-15 DIAGNOSIS — Z79.811 AROMATASE INHIBITOR USE: ICD-10-CM

## 2019-02-15 DIAGNOSIS — M81.8 OTHER OSTEOPOROSIS WITHOUT CURRENT PATHOLOGICAL FRACTURE: ICD-10-CM

## 2019-02-15 DIAGNOSIS — Z12.31 VISIT FOR SCREENING MAMMOGRAM: ICD-10-CM

## 2019-02-15 DIAGNOSIS — Z78.0 POSTMENOPAUSAL STATUS: Primary | ICD-10-CM

## 2019-02-15 DIAGNOSIS — M81.8 OTHER OSTEOPOROSIS WITHOUT CURRENT PATHOLOGICAL FRACTURE: Primary | ICD-10-CM

## 2019-02-15 LAB
ALBUMIN SERPL-MCNC: 3.7 G/DL (ref 3.4–5)
ALP SERPL-CCNC: 51 U/L (ref 40–150)
ALT SERPL W P-5'-P-CCNC: 22 U/L (ref 0–50)
ANION GAP SERPL CALCULATED.3IONS-SCNC: 7 MMOL/L (ref 3–14)
AST SERPL W P-5'-P-CCNC: 16 U/L (ref 0–45)
BILIRUB SERPL-MCNC: 0.4 MG/DL (ref 0.2–1.3)
BUN SERPL-MCNC: 18 MG/DL (ref 7–30)
CALCIUM SERPL-MCNC: 9.5 MG/DL (ref 8.5–10.1)
CHLORIDE SERPL-SCNC: 111 MMOL/L (ref 94–109)
CO2 SERPL-SCNC: 25 MMOL/L (ref 20–32)
CREAT SERPL-MCNC: 0.94 MG/DL (ref 0.52–1.04)
GFR SERPL CREATININE-BSD FRML MDRD: 59 ML/MIN/{1.73_M2}
GLUCOSE SERPL-MCNC: 143 MG/DL (ref 70–99)
POTASSIUM SERPL-SCNC: 4 MMOL/L (ref 3.4–5.3)
PROT SERPL-MCNC: 7 G/DL (ref 6.8–8.8)
SODIUM SERPL-SCNC: 143 MMOL/L (ref 133–144)

## 2019-02-15 PROCEDURE — 99214 OFFICE O/P EST MOD 30 MIN: CPT | Mod: 25 | Performed by: INTERNAL MEDICINE

## 2019-02-15 PROCEDURE — 99207 ZZC NO CHARGE LOS: CPT

## 2019-02-15 PROCEDURE — 80053 COMPREHEN METABOLIC PANEL: CPT | Performed by: INTERNAL MEDICINE

## 2019-02-15 PROCEDURE — 77067 SCR MAMMO BI INCL CAD: CPT | Performed by: STUDENT IN AN ORGANIZED HEALTH CARE EDUCATION/TRAINING PROGRAM

## 2019-02-15 PROCEDURE — 96372 THER/PROPH/DIAG INJ SC/IM: CPT | Performed by: INTERNAL MEDICINE

## 2019-02-15 PROCEDURE — 36415 COLL VENOUS BLD VENIPUNCTURE: CPT | Performed by: INTERNAL MEDICINE

## 2019-02-15 RX ORDER — METHYLPREDNISOLONE SODIUM SUCCINATE 125 MG/2ML
125 INJECTION, POWDER, LYOPHILIZED, FOR SOLUTION INTRAMUSCULAR; INTRAVENOUS
Status: CANCELLED
Start: 2019-08-16

## 2019-02-15 RX ORDER — ALBUTEROL SULFATE 90 UG/1
1-2 AEROSOL, METERED RESPIRATORY (INHALATION)
Status: CANCELLED
Start: 2020-02-21

## 2019-02-15 RX ORDER — MEPERIDINE HYDROCHLORIDE 25 MG/ML
25 INJECTION INTRAMUSCULAR; INTRAVENOUS; SUBCUTANEOUS EVERY 30 MIN PRN
Status: CANCELLED | OUTPATIENT
Start: 2020-02-21

## 2019-02-15 RX ORDER — ANASTROZOLE 1 MG/1
1 TABLET ORAL DAILY
Qty: 90 TABLET | Refills: 3 | Status: SHIPPED | OUTPATIENT
Start: 2019-02-15 | End: 2019-07-26

## 2019-02-15 RX ORDER — ALBUTEROL SULFATE 0.83 MG/ML
2.5 SOLUTION RESPIRATORY (INHALATION)
Status: CANCELLED | OUTPATIENT
Start: 2019-08-16

## 2019-02-15 RX ORDER — DIPHENHYDRAMINE HYDROCHLORIDE 50 MG/ML
50 INJECTION INTRAMUSCULAR; INTRAVENOUS
Status: CANCELLED
Start: 2019-08-16

## 2019-02-15 RX ORDER — ALBUTEROL SULFATE 0.83 MG/ML
2.5 SOLUTION RESPIRATORY (INHALATION)
Status: CANCELLED | OUTPATIENT
Start: 2020-02-21

## 2019-02-15 RX ORDER — DIPHENHYDRAMINE HYDROCHLORIDE 50 MG/ML
50 INJECTION INTRAMUSCULAR; INTRAVENOUS
Status: CANCELLED
Start: 2020-02-21

## 2019-02-15 RX ORDER — METHYLPREDNISOLONE SODIUM SUCCINATE 125 MG/2ML
125 INJECTION, POWDER, LYOPHILIZED, FOR SOLUTION INTRAMUSCULAR; INTRAVENOUS
Status: CANCELLED
Start: 2020-02-21

## 2019-02-15 RX ORDER — EPINEPHRINE 1 MG/ML
0.3 INJECTION, SOLUTION INTRAMUSCULAR; SUBCUTANEOUS EVERY 5 MIN PRN
Status: CANCELLED | OUTPATIENT
Start: 2020-02-21

## 2019-02-15 RX ORDER — EPINEPHRINE 0.3 MG/.3ML
0.3 INJECTION SUBCUTANEOUS EVERY 5 MIN PRN
Status: CANCELLED | OUTPATIENT
Start: 2020-02-21

## 2019-02-15 RX ORDER — EPINEPHRINE 0.3 MG/.3ML
0.3 INJECTION SUBCUTANEOUS EVERY 5 MIN PRN
Status: CANCELLED | OUTPATIENT
Start: 2019-08-16

## 2019-02-15 RX ORDER — MEPERIDINE HYDROCHLORIDE 25 MG/ML
25 INJECTION INTRAMUSCULAR; INTRAVENOUS; SUBCUTANEOUS EVERY 30 MIN PRN
Status: CANCELLED | OUTPATIENT
Start: 2019-08-16

## 2019-02-15 RX ORDER — EPINEPHRINE 1 MG/ML
0.3 INJECTION, SOLUTION INTRAMUSCULAR; SUBCUTANEOUS EVERY 5 MIN PRN
Status: CANCELLED | OUTPATIENT
Start: 2019-08-16

## 2019-02-15 RX ORDER — ALBUTEROL SULFATE 90 UG/1
1-2 AEROSOL, METERED RESPIRATORY (INHALATION)
Status: CANCELLED
Start: 2019-08-16

## 2019-02-15 RX ORDER — SODIUM CHLORIDE 9 MG/ML
1000 INJECTION, SOLUTION INTRAVENOUS CONTINUOUS PRN
Status: CANCELLED
Start: 2019-08-16

## 2019-02-15 RX ORDER — SODIUM CHLORIDE 9 MG/ML
1000 INJECTION, SOLUTION INTRAVENOUS CONTINUOUS PRN
Status: CANCELLED
Start: 2020-02-21

## 2019-02-15 ASSESSMENT — MIFFLIN-ST. JEOR: SCORE: 1055.06

## 2019-02-15 ASSESSMENT — PAIN SCALES - GENERAL: PAINLEVEL: MILD PAIN (2)

## 2019-02-15 NOTE — PROGRESS NOTES
Infusion Nursing Note:  Radha Patel presents today for Prolia.    Patient seen by provider today: No   present during visit today: Not Applicable.    Note: N/A.    Intravenous Access:  No Intravenous access/labs at this visit.    Treatment Conditions:  Not Applicable.      Post Infusion Assessment:  Patient tolerated injection without incident.    Discharge Plan:   Patient and/or family verbalized understanding of discharge instructions and all questions answered.  Patient discharged in stable condition accompanied by: self.  Departure Mode: Ambulatory.    Radha Treviño RN

## 2019-02-15 NOTE — NURSING NOTE
"Oncology Rooming Note    February 15, 2019 3:30 PM   Radha Patel is a 73 year old female who presents for:    Chief Complaint   Patient presents with     Oncology Clinic Visit     Follow up     Initial Vitals: /67 (BP Location: Right arm)   Pulse 63   Temp 98.1  F (36.7  C) (Oral)   Resp 18   Ht 1.655 m (5' 5.16\")   Wt 55.2 kg (121 lb 10 oz)   SpO2 99%   BMI 20.14 kg/m   Estimated body mass index is 20.14 kg/m  as calculated from the following:    Height as of this encounter: 1.655 m (5' 5.16\").    Weight as of this encounter: 55.2 kg (121 lb 10 oz). Body surface area is 1.59 meters squared.  Mild Pain (2) Comment: Data Unavailable   No LMP recorded. Patient has had a hysterectomy.  Allergies reviewed: Yes  Medications reviewed: Yes    Medications: Medication refills not needed today.  Pharmacy name entered into Clinic[facility]:    [facility] - [place], [place] - [address] [facility] [place] [address] [facility]  [address]    5 minutes for nursing intake (face to face time)     Hope Osei LPN              "
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
## 2019-02-18 NOTE — TELEPHONE ENCOUNTER
I have spoke to patient and they are aware that their provider will be changing to Dr. Boateng for their upcoming appointment on 03/15/2019 at 1200.        Evelyn Brandt MA

## 2019-03-08 ENCOUNTER — TELEPHONE (OUTPATIENT)
Dept: RADIATION ONCOLOGY | Facility: CLINIC | Age: 74
End: 2019-03-08

## 2019-03-08 NOTE — TELEPHONE ENCOUNTER
Attempted to contact patient for a reminder call regarding their appointment with Dr. Boateng on 03/15/2019 at 1200 at [facility].  Voicemail was left with appointment time, date and contact information for rescheduling if needed.        Evelyn Brandt MA

## 2019-03-11 ENCOUNTER — ANCILLARY PROCEDURE (OUTPATIENT)
Dept: CT IMAGING | Facility: CLINIC | Age: 74
End: 2019-03-11
Attending: RADIOLOGY
Payer: MEDICARE

## 2019-03-11 DIAGNOSIS — C50.912 INVASIVE DUCTAL CARCINOMA OF LEFT BREAST (H): ICD-10-CM

## 2019-03-11 LAB — RADIOLOGIST FLAGS: NORMAL

## 2019-03-11 PROCEDURE — 71250 CT THORAX DX C-: CPT | Performed by: RADIOLOGY

## 2019-03-15 ENCOUNTER — OFFICE VISIT (OUTPATIENT)
Dept: RADIATION ONCOLOGY | Facility: CLINIC | Age: 74
End: 2019-03-15
Payer: MEDICARE

## 2019-03-15 VITALS
RESPIRATION RATE: 18 BRPM | BODY MASS INDEX: 20.29 KG/M2 | DIASTOLIC BLOOD PRESSURE: 52 MMHG | WEIGHT: 122.5 LBS | OXYGEN SATURATION: 96 % | SYSTOLIC BLOOD PRESSURE: 111 MMHG | TEMPERATURE: 97 F | HEART RATE: 57 BPM

## 2019-03-15 DIAGNOSIS — R91.8 PULMONARY NODULES: ICD-10-CM

## 2019-03-15 DIAGNOSIS — C50.912 INVASIVE DUCTAL CARCINOMA OF LEFT BREAST (H): Primary | ICD-10-CM

## 2019-03-15 PROCEDURE — 99024 POSTOP FOLLOW-UP VISIT: CPT | Performed by: RADIOLOGY

## 2019-03-15 ASSESSMENT — PAIN SCALES - GENERAL: PAINLEVEL: NO PAIN (0)

## 2019-03-15 NOTE — NURSING NOTE
FOLLOW-UP VISIT    Patient Name: Radha Patel      : 1945     Age: 73 year old        ______________________________________________________________________________     Chief Complaint   Patient presents with     Cancer     Radiation Oncology return visit with Dr. Boateng     /52 (BP Location: Right arm, Patient Position: Sitting, Cuff Size: Adult Regular)   Pulse 57   Temp 97  F (36.1  C) (Oral)   Resp 18   Wt 55.6 kg (122 lb 8 oz)   SpO2 96%   BMI 20.29 kg/m       Date Radiation Completed: 6,000 cGy to left breast + LNs completed on 10/02/2018    Pain  Denies    Labs  Other Labs: Yes: 02/15/2019    Imaging  CT: 2019        Other Appointments:     MD Name: Dr. Bee Appointment Date: 02/15/2019   MD Name: Dr. Boateng Appointment Date: 2019   MD Name: Appointment Date:               Nurse face-to-face time: Level 2:  5 min face to face time
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
## 2019-03-15 NOTE — PATIENT INSTRUCTIONS
Preventive Care:    Colorectal Cancer Screening: During our visit today, we discussed that it is recommended you receive colorectal cancer screening. Please call or make an appointment with your primary care provider to discuss this. You may also call the elicit scheduling line ([phone]) to set up a colonoscopy appointment.    Please contact [facility] RN with questions or concerns following today's appointment: [phone].    Thank you!

## 2019-03-18 ENCOUNTER — TELEPHONE (OUTPATIENT)
Dept: RADIATION ONCOLOGY | Facility: CLINIC | Age: 74
End: 2019-03-18

## 2019-03-18 NOTE — TELEPHONE ENCOUNTER
I left a voicemail and mailed out a patient calendar to inform patient of appointments set up for September 2019.    CT scan 09/16/2019 1100    Follow up with Dr. Boateng 09/20/2019 1115      Evelyn Brandt MA

## 2019-06-25 ENCOUNTER — TELEPHONE (OUTPATIENT)
Dept: RADIATION ONCOLOGY | Facility: CLINIC | Age: 74
End: 2019-06-25

## 2019-06-25 NOTE — TELEPHONE ENCOUNTER
I left a voicemail for patient regarding upcoming appointment on 09/20/2019 this appointment needs to be reschedule due to office remodeling, Dr. Boateng will only be seeing patients on Wednesdays during this time.    Please call Katarzyna directly for this matter at [phone] per her sister     Evelyn Brandt MA

## 2019-07-26 DIAGNOSIS — C50.912 INVASIVE DUCTAL CARCINOMA OF LEFT BREAST (H): ICD-10-CM

## 2019-07-26 DIAGNOSIS — M81.8 OTHER OSTEOPOROSIS WITHOUT CURRENT PATHOLOGICAL FRACTURE: ICD-10-CM

## 2019-07-26 DIAGNOSIS — Z79.811 AROMATASE INHIBITOR USE: ICD-10-CM

## 2019-07-26 RX ORDER — ANASTROZOLE 1 MG/1
1 TABLET ORAL DAILY
Qty: 90 TABLET | Refills: 3 | Status: SHIPPED | OUTPATIENT
Start: 2019-07-26 | End: 2020-02-21

## 2019-08-12 ENCOUNTER — ANCILLARY PROCEDURE (OUTPATIENT)
Dept: BONE DENSITY | Facility: CLINIC | Age: 74
End: 2019-08-12
Attending: INTERNAL MEDICINE
Payer: MEDICARE

## 2019-08-12 DIAGNOSIS — Z78.0 POSTMENOPAUSAL STATUS: ICD-10-CM

## 2019-08-12 PROCEDURE — 77080 DXA BONE DENSITY AXIAL: CPT | Performed by: RADIOLOGY

## 2019-08-16 ENCOUNTER — INFUSION THERAPY VISIT (OUTPATIENT)
Dept: INFUSION THERAPY | Facility: CLINIC | Age: 74
End: 2019-08-16
Payer: MEDICARE

## 2019-08-16 ENCOUNTER — ONCOLOGY VISIT (OUTPATIENT)
Dept: ONCOLOGY | Facility: CLINIC | Age: 74
End: 2019-08-16
Attending: INTERNAL MEDICINE
Payer: MEDICARE

## 2019-08-16 VITALS
TEMPERATURE: 98.4 F | RESPIRATION RATE: 18 BRPM | BODY MASS INDEX: 21.56 KG/M2 | SYSTOLIC BLOOD PRESSURE: 136 MMHG | HEART RATE: 73 BPM | DIASTOLIC BLOOD PRESSURE: 68 MMHG | OXYGEN SATURATION: 97 % | HEIGHT: 65 IN | WEIGHT: 129.41 LBS

## 2019-08-16 VITALS
WEIGHT: 129.3 LBS | HEIGHT: 65 IN | DIASTOLIC BLOOD PRESSURE: 68 MMHG | BODY MASS INDEX: 21.54 KG/M2 | HEART RATE: 73 BPM | TEMPERATURE: 98.4 F | RESPIRATION RATE: 18 BRPM | SYSTOLIC BLOOD PRESSURE: 136 MMHG | OXYGEN SATURATION: 97 %

## 2019-08-16 DIAGNOSIS — C50.912 INVASIVE DUCTAL CARCINOMA OF LEFT BREAST (H): ICD-10-CM

## 2019-08-16 DIAGNOSIS — Z79.811 AROMATASE INHIBITOR USE: ICD-10-CM

## 2019-08-16 DIAGNOSIS — Z78.0 POSTMENOPAUSAL STATUS: ICD-10-CM

## 2019-08-16 DIAGNOSIS — Z12.31 VISIT FOR SCREENING MAMMOGRAM: Primary | ICD-10-CM

## 2019-08-16 DIAGNOSIS — M81.8 OTHER OSTEOPOROSIS WITHOUT CURRENT PATHOLOGICAL FRACTURE: Primary | ICD-10-CM

## 2019-08-16 LAB
ALBUMIN SERPL-MCNC: 3.9 G/DL (ref 3.4–5)
ALP SERPL-CCNC: 57 U/L (ref 40–150)
ALT SERPL W P-5'-P-CCNC: 24 U/L (ref 0–50)
ANION GAP SERPL CALCULATED.3IONS-SCNC: 7 MMOL/L (ref 3–14)
AST SERPL W P-5'-P-CCNC: 21 U/L (ref 0–45)
BASOPHILS # BLD AUTO: 0 10E9/L (ref 0–0.2)
BASOPHILS NFR BLD AUTO: 0.4 %
BILIRUB SERPL-MCNC: 0.7 MG/DL (ref 0.2–1.3)
BUN SERPL-MCNC: 14 MG/DL (ref 7–30)
CALCIUM SERPL-MCNC: 9.2 MG/DL (ref 8.5–10.1)
CHLORIDE SERPL-SCNC: 110 MMOL/L (ref 94–109)
CO2 SERPL-SCNC: 24 MMOL/L (ref 20–32)
CREAT SERPL-MCNC: 0.77 MG/DL (ref 0.52–1.04)
DIFFERENTIAL METHOD BLD: ABNORMAL
EOSINOPHIL # BLD AUTO: 0.1 10E9/L (ref 0–0.7)
EOSINOPHIL NFR BLD AUTO: 1.9 %
ERYTHROCYTE [DISTWIDTH] IN BLOOD BY AUTOMATED COUNT: 12 % (ref 10–15)
GFR SERPL CREATININE-BSD FRML MDRD: 75 ML/MIN/{1.73_M2}
GLUCOSE SERPL-MCNC: 111 MG/DL (ref 70–99)
HCT VFR BLD AUTO: 36.3 % (ref 35–47)
HGB BLD-MCNC: 12.5 G/DL (ref 11.7–15.7)
IMM GRANULOCYTES # BLD: 0 10E9/L (ref 0–0.4)
IMM GRANULOCYTES NFR BLD: 0.3 %
LYMPHOCYTES # BLD AUTO: 0.7 10E9/L (ref 0.8–5.3)
LYMPHOCYTES NFR BLD AUTO: 9.9 %
MCH RBC QN AUTO: 31.6 PG (ref 26.5–33)
MCHC RBC AUTO-ENTMCNC: 34.4 G/DL (ref 31.5–36.5)
MCV RBC AUTO: 92 FL (ref 78–100)
MONOCYTES # BLD AUTO: 0.7 10E9/L (ref 0–1.3)
MONOCYTES NFR BLD AUTO: 10.8 %
NEUTROPHILS # BLD AUTO: 5.3 10E9/L (ref 1.6–8.3)
NEUTROPHILS NFR BLD AUTO: 76.7 %
PLATELET # BLD AUTO: 197 10E9/L (ref 150–450)
POTASSIUM SERPL-SCNC: 3.9 MMOL/L (ref 3.4–5.3)
PROT SERPL-MCNC: 7.5 G/DL (ref 6.8–8.8)
RBC # BLD AUTO: 3.96 10E12/L (ref 3.8–5.2)
SODIUM SERPL-SCNC: 141 MMOL/L (ref 133–144)
WBC # BLD AUTO: 6.9 10E9/L (ref 4–11)

## 2019-08-16 PROCEDURE — 85025 COMPLETE CBC W/AUTO DIFF WBC: CPT | Performed by: INTERNAL MEDICINE

## 2019-08-16 PROCEDURE — 80053 COMPREHEN METABOLIC PANEL: CPT | Performed by: INTERNAL MEDICINE

## 2019-08-16 PROCEDURE — 36415 COLL VENOUS BLD VENIPUNCTURE: CPT | Performed by: INTERNAL MEDICINE

## 2019-08-16 PROCEDURE — 96372 THER/PROPH/DIAG INJ SC/IM: CPT | Performed by: NURSE PRACTITIONER

## 2019-08-16 PROCEDURE — 99214 OFFICE O/P EST MOD 30 MIN: CPT | Mod: 25 | Performed by: INTERNAL MEDICINE

## 2019-08-16 PROCEDURE — 99207 ZZC NO CHARGE NURSE ONLY: CPT

## 2019-08-16 ASSESSMENT — MIFFLIN-ST. JEOR
SCORE: 1089.87
SCORE: 1090.37

## 2019-08-16 ASSESSMENT — PAIN SCALES - GENERAL
PAINLEVEL: NO PAIN (0)
PAINLEVEL: NO PAIN (0)

## 2019-08-16 NOTE — PROGRESS NOTES
Infusion Nursing Note:  Radha Patel presents today for Prolia.    Patient seen by provider today: Yes: Dr. Bee   present during visit today: Not Applicable.    Note: Patient assessed by Dr. Bee prior to injection. See provider visit for note.    Intravenous Access:  Labs drawn without difficulty.    Treatment Conditions:  Lab Results   Component Value Date    HGB 12.5 08/16/2019     Lab Results   Component Value Date    WBC 6.9 08/16/2019      Lab Results   Component Value Date    ANEU 5.3 08/16/2019     Lab Results   Component Value Date     08/16/2019      Lab Results   Component Value Date     08/16/2019                   Lab Results   Component Value Date    POTASSIUM 3.9 08/16/2019           No results found for: MAG         Lab Results   Component Value Date    CR 0.77 08/16/2019                   Lab Results   Component Value Date    BERTIN 9.2 08/16/2019                Lab Results   Component Value Date    BILITOTAL 0.7 08/16/2019           Lab Results   Component Value Date    ALBUMIN 3.9 08/16/2019                    Lab Results   Component Value Date    ALT 24 08/16/2019           Lab Results   Component Value Date    AST 21 08/16/2019           Post Infusion Assessment:  Patient tolerated injection without incident.  Site patent and intact, free from redness, edema or discomfort.       Discharge Plan:   Patient declined prescription refills.  Patient and/or family verbalized understanding of discharge instructions and all questions answered.  Copy of AVS reviewed with patient and/or family.  Patient brought to scheduling desk to schedule next appointment.  Patient discharged in stable condition accompanied by: self.  Departure Mode: Ambulatory.    Hope Osei LPN

## 2019-08-16 NOTE — NURSING NOTE
"Oncology Rooming Note    August 16, 2019 1:43 PM   Radha Patel is a 73 year old female who presents for:    Chief Complaint   Patient presents with     Oncology Clinic Visit     6 mon f/u     Initial Vitals: /68 (BP Location: Right arm)   Pulse 73   Temp 98.4  F (36.9  C) (Oral)   Resp 18   Ht 1.655 m (5' 5.16\")   Wt 58.7 kg (129 lb 4.8 oz)   SpO2 97%   BMI 21.41 kg/m   Estimated body mass index is 21.41 kg/m  as calculated from the following:    Height as of this encounter: 1.655 m (5' 5.16\").    Weight as of this encounter: 58.7 kg (129 lb 4.8 oz). Body surface area is 1.64 meters squared.  No Pain (0) Comment: Data Unavailable   No LMP recorded. Patient has had a hysterectomy.  Allergies reviewed: Yes  Medications reviewed: Yes    Medications: Medication refills not needed today.  Pharmacy name entered into [employer]:    [facility] - [place], [place] - [address] [id number] - [place], [place] - [address] [facility]  [address]    Hope Osei LPN              "
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
## 2019-09-16 ENCOUNTER — ANCILLARY PROCEDURE (OUTPATIENT)
Dept: CT IMAGING | Facility: CLINIC | Age: 74
End: 2019-09-16
Attending: RADIOLOGY
Payer: MEDICARE

## 2019-09-16 DIAGNOSIS — R91.8 PULMONARY NODULES: ICD-10-CM

## 2019-09-16 PROCEDURE — 71250 CT THORAX DX C-: CPT | Performed by: RADIOLOGY
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
## 2019-09-18 ENCOUNTER — OFFICE VISIT (OUTPATIENT)
Dept: RADIATION ONCOLOGY | Facility: CLINIC | Age: 74
End: 2019-09-18
Payer: MEDICARE

## 2019-09-18 VITALS
RESPIRATION RATE: 16 BRPM | SYSTOLIC BLOOD PRESSURE: 121 MMHG | HEART RATE: 70 BPM | TEMPERATURE: 97.5 F | BODY MASS INDEX: 20.8 KG/M2 | OXYGEN SATURATION: 95 % | WEIGHT: 125.6 LBS | DIASTOLIC BLOOD PRESSURE: 62 MMHG

## 2019-09-18 DIAGNOSIS — R91.8 PULMONARY NODULES: Primary | ICD-10-CM

## 2019-09-18 PROCEDURE — 99214 OFFICE O/P EST MOD 30 MIN: CPT | Performed by: RADIOLOGY

## 2019-09-18 ASSESSMENT — PAIN SCALES - GENERAL: PAINLEVEL: NO PAIN (0)

## 2019-09-18 NOTE — NURSING NOTE
FOLLOW-UP VISIT    Patient Name: Radha Patel      : 1945     Age: 73 year old        ______________________________________________________________________________     Chief Complaint   Patient presents with     Cancer     Radiation oncology return visit with Dr. Boateng     /62 (BP Location: Right arm, Patient Position: Chair, Cuff Size: Adult Regular)   Pulse 70   Temp 97.5  F (36.4  C) (Oral)   Resp 16   Wt 57 kg (125 lb 9.6 oz)   SpO2 95%   BMI 20.80 kg/m       Date Radiation Completed: 6,000 cGy to left breast + LNs and 5,000 cGy to left SCV completed on 10/2/2018    Pain  Denies    Labs  Other Labs: No    Imaging  CT Chest: 2019          Other Appointments:     MD Name: Dr. Bee Appointment Date: 2020   MD Name: Appointment Date:   MD Name: Appointment Date:   Other Appointment Notes: Labs and Mammogram scheduled 2020    Residual Radiation side effect: Patient reports she is feeling well, denies fatigue, continues working at [employer].  Patient denies pain.  Denies skin concerns in previous radiation treatment field, denies concerns with ROM of left upper extremity.  Patient denies coughing, shortness of breath or difficulty breathing.  Patient reports she is taking Arimidex as prescribed by Dr. Bee and denies concerns.     Nurse face-to-face time: Level 4:  15 min face to face time

## 2019-09-18 NOTE — PATIENT INSTRUCTIONS
Preventive Care:    Colorectal Cancer Screening: During our visit today, we discussed that it is recommended you receive colorectal cancer screening. Please call or make an appointment with your primary care provider to discuss this. You may also call the [facility] scheduling line ([phone]) to set up a colonoscopy appointment.      Please contact [facility] RN with questions or concerns following today's appointment: [phone].    Thank you!

## 2020-02-21 ENCOUNTER — ANCILLARY PROCEDURE (OUTPATIENT)
Dept: MAMMOGRAPHY | Facility: CLINIC | Age: 75
End: 2020-02-21
Attending: INTERNAL MEDICINE
Payer: MEDICARE

## 2020-02-21 ENCOUNTER — INFUSION THERAPY VISIT (OUTPATIENT)
Dept: INFUSION THERAPY | Facility: CLINIC | Age: 75
End: 2020-02-21
Payer: MEDICARE

## 2020-02-21 ENCOUNTER — ONCOLOGY VISIT (OUTPATIENT)
Dept: ONCOLOGY | Facility: CLINIC | Age: 75
End: 2020-02-21
Attending: INTERNAL MEDICINE
Payer: MEDICARE

## 2020-02-21 VITALS
SYSTOLIC BLOOD PRESSURE: 122 MMHG | BODY MASS INDEX: 21.13 KG/M2 | DIASTOLIC BLOOD PRESSURE: 67 MMHG | OXYGEN SATURATION: 99 % | WEIGHT: 126.8 LBS | HEIGHT: 65 IN | HEART RATE: 64 BPM | RESPIRATION RATE: 16 BRPM

## 2020-02-21 VITALS
WEIGHT: 126.76 LBS | OXYGEN SATURATION: 99 % | BODY MASS INDEX: 21.12 KG/M2 | HEART RATE: 64 BPM | RESPIRATION RATE: 16 BRPM | HEIGHT: 65 IN | DIASTOLIC BLOOD PRESSURE: 67 MMHG | SYSTOLIC BLOOD PRESSURE: 122 MMHG

## 2020-02-21 DIAGNOSIS — R20.2 NUMBNESS AND TINGLING IN LEFT HAND: Primary | ICD-10-CM

## 2020-02-21 DIAGNOSIS — Z12.31 VISIT FOR SCREENING MAMMOGRAM: ICD-10-CM

## 2020-02-21 DIAGNOSIS — M81.8 OTHER OSTEOPOROSIS WITHOUT CURRENT PATHOLOGICAL FRACTURE: Primary | ICD-10-CM

## 2020-02-21 DIAGNOSIS — Z79.811 AROMATASE INHIBITOR USE: ICD-10-CM

## 2020-02-21 DIAGNOSIS — M81.8 OTHER OSTEOPOROSIS WITHOUT CURRENT PATHOLOGICAL FRACTURE: ICD-10-CM

## 2020-02-21 DIAGNOSIS — C50.912 INVASIVE DUCTAL CARCINOMA OF LEFT BREAST (H): ICD-10-CM

## 2020-02-21 DIAGNOSIS — R20.0 NUMBNESS AND TINGLING IN LEFT HAND: Primary | ICD-10-CM

## 2020-02-21 LAB
ALBUMIN SERPL-MCNC: 3.8 G/DL (ref 3.4–5)
ALP SERPL-CCNC: 65 U/L (ref 40–150)
ALT SERPL W P-5'-P-CCNC: 21 U/L (ref 0–50)
ANION GAP SERPL CALCULATED.3IONS-SCNC: 8 MMOL/L (ref 3–14)
AST SERPL W P-5'-P-CCNC: 13 U/L (ref 0–45)
BILIRUB SERPL-MCNC: 0.5 MG/DL (ref 0.2–1.3)
BUN SERPL-MCNC: 14 MG/DL (ref 7–30)
CALCIUM SERPL-MCNC: 9.5 MG/DL (ref 8.5–10.1)
CHLORIDE SERPL-SCNC: 109 MMOL/L (ref 94–109)
CO2 SERPL-SCNC: 23 MMOL/L (ref 20–32)
CREAT SERPL-MCNC: 0.77 MG/DL (ref 0.52–1.04)
GFR SERPL CREATININE-BSD FRML MDRD: 76 ML/MIN/{1.73_M2}
GLUCOSE SERPL-MCNC: 108 MG/DL (ref 70–99)
POTASSIUM SERPL-SCNC: 3.9 MMOL/L (ref 3.4–5.3)
PROT SERPL-MCNC: 7.7 G/DL (ref 6.8–8.8)
SODIUM SERPL-SCNC: 140 MMOL/L (ref 133–144)

## 2020-02-21 PROCEDURE — 36415 COLL VENOUS BLD VENIPUNCTURE: CPT | Performed by: INTERNAL MEDICINE

## 2020-02-21 PROCEDURE — 77063 BREAST TOMOSYNTHESIS BI: CPT | Performed by: RADIOLOGY

## 2020-02-21 PROCEDURE — 77067 SCR MAMMO BI INCL CAD: CPT | Performed by: RADIOLOGY

## 2020-02-21 PROCEDURE — 80053 COMPREHEN METABOLIC PANEL: CPT | Performed by: INTERNAL MEDICINE

## 2020-02-21 PROCEDURE — 96372 THER/PROPH/DIAG INJ SC/IM: CPT | Performed by: INTERNAL MEDICINE

## 2020-02-21 PROCEDURE — 99214 OFFICE O/P EST MOD 30 MIN: CPT | Mod: 25 | Performed by: INTERNAL MEDICINE

## 2020-02-21 PROCEDURE — 99207 ZZC NO CHARGE NURSE ONLY: CPT

## 2020-02-21 RX ORDER — ANASTROZOLE 1 MG/1
1 TABLET ORAL DAILY
Qty: 90 TABLET | Refills: 3 | Status: SHIPPED | OUTPATIENT
Start: 2020-02-21 | End: 2020-08-04

## 2020-02-21 ASSESSMENT — MIFFLIN-ST. JEOR
SCORE: 1073.53
SCORE: 1073.37

## 2020-02-21 ASSESSMENT — PAIN SCALES - GENERAL
PAINLEVEL: NO PAIN (0)
PAINLEVEL: NO PAIN (0)
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
## 2020-02-21 NOTE — NURSING NOTE
"Oncology Rooming Note    February 21, 2020 10:55 AM   Radha Patel is a 74 year old female who presents for:    Chief Complaint   Patient presents with     Oncology Clinic Visit     6 month follow up     Initial Vitals: /67 (BP Location: Right arm)   Pulse 64   Resp 16   Ht 1.655 m (5' 5.16\")   Wt 57.5 kg (126 lb 12.8 oz)   SpO2 99%   BMI 21.00 kg/m   Estimated body mass index is 21 kg/m  as calculated from the following:    Height as of this encounter: 1.655 m (5' 5.16\").    Weight as of this encounter: 57.5 kg (126 lb 12.8 oz). Body surface area is 1.63 meters squared.  No Pain (0) Comment: Data Unavailable   No LMP recorded. Patient has had a hysterectomy.  Allergies reviewed: Yes  Medications reviewed: Yes    Medications: Medication refills not needed today.  Pharmacy name entered into [facility]:    [facility] - [place], [place] - [address] [id number] - [place], [place] - [facility] [address] [facility]  [address]    Alessandra Henao LPN              "

## 2020-02-21 NOTE — PROGRESS NOTES
Infusion Nursing Note:  Radha Patel presents today for Prolia injection.    Patient seen by provider today: Yes: .   present during visit today: Not Applicable.    Note: Patient assessed prior at clinic visit with . Patient instructed to schedule follow up with labs and Prolia in 6 months.     Intravenous Access:  Labs drawn without difficulty.    Treatment Conditions:  Lab Results   Component Value Date    HGB 12.5 08/16/2019     Lab Results   Component Value Date    WBC 6.9 08/16/2019      Lab Results   Component Value Date    ANEU 5.3 08/16/2019     Lab Results   Component Value Date     08/16/2019      Results reviewed, labs MET treatment parameters, ok to proceed with treatment.      Post Infusion Assessment:  Patient tolerated injection without incident.  Site patent and intact, free from redness, edema or discomfort.       Discharge Plan:   Patient discharged in stable condition accompanied by: self.  Departure Mode: Ambulatory.    Alessandra Henao LPN
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
## 2020-03-25 ENCOUNTER — TELEPHONE (OUTPATIENT)
Dept: NEUROLOGY | Facility: CLINIC | Age: 75
End: 2020-03-25

## 2020-03-25 NOTE — TELEPHONE ENCOUNTER
Left message for pt to call back and complete screening before appt tomorrow.    Karen Rizzo LPN    Do you have any of the following symptoms:  a)      Fever (or reported chills) NO  b)      Shortness of Breath NO  c)      Rash NO    If a patient reports yes to any of these symptoms, obtain direction from the provider and call the patient back to let them know if they can come in or not.  1.    Provider needs to determine if this patient should still be seen in clinic.  2.    If decision to not see in clinic, call patient back and refer them to COVID- 19 [email or web address] or schedule COVID-19 phone visit.  3.    Turn in-person visit into telephone visit (FOR RETURN PATIENTS ONLY)    Remind patients that visitors are not allowed on site. Only one legal guardian who screens negative to the above questions will be allowed to accompany patients. If a patient indicates that they will be bringing a legal guardian with them to the appointment please make sure to screen both the patient and the legal guardian for symptoms using the tool above.

## 2020-03-26 ENCOUNTER — OFFICE VISIT (OUTPATIENT)
Dept: NEUROLOGY | Facility: CLINIC | Age: 75
End: 2020-03-26
Payer: MEDICARE

## 2020-03-26 VITALS
HEART RATE: 70 BPM | HEIGHT: 65 IN | WEIGHT: 128.6 LBS | DIASTOLIC BLOOD PRESSURE: 68 MMHG | SYSTOLIC BLOOD PRESSURE: 171 MMHG | BODY MASS INDEX: 21.43 KG/M2 | OXYGEN SATURATION: 97 %

## 2020-03-26 DIAGNOSIS — R20.0 NUMBNESS AND TINGLING IN LEFT HAND: ICD-10-CM

## 2020-03-26 DIAGNOSIS — R20.2 NUMBNESS AND TINGLING IN LEFT HAND: ICD-10-CM

## 2020-03-26 PROCEDURE — 99204 OFFICE O/P NEW MOD 45 MIN: CPT | Performed by: PSYCHIATRY & NEUROLOGY

## 2020-03-26 RX ORDER — FLUOXETINE 10 MG/1
10 CAPSULE ORAL
COMMUNITY
Start: 2019-02-22 | End: 2021-03-10

## 2020-03-26 RX ORDER — AMLODIPINE BESYLATE 5 MG/1
5 TABLET ORAL
COMMUNITY
Start: 2019-05-14 | End: 2020-08-21

## 2020-03-26 ASSESSMENT — MIFFLIN-ST. JEOR: SCORE: 1079.21

## 2020-03-26 ASSESSMENT — PAIN SCALES - GENERAL: PAINLEVEL: NO PAIN (0)

## 2020-03-26 NOTE — LETTER
"    3/26/2020         RE: Radha Patel  [address]  [facility] [address]        Dear Colleague,    Thank you for referring your patient, Radha Patel, to the [facility]. Please see a copy of my visit note below.    Radha Patel's goals for this visit include: consult  She requests these members of her care team be copied on today's visit information:     PCP: Clinic, [facility]    Referring Provider:  Chastity Bee MD  [address]  [place] [address]        Visit Date:   03/26/2020      NEUROLOGY CLINIC NOTE      HISTORY OF PRESENT ILLNESS:  Bethany Patel is a 74-year-old right-handed female here for evaluation of left hand numbness and tingling.      The patient has noted this for the last 4-6 weeks.  She reports tingling and numbness involving the thumb and the second, third and fourth digits.  Initially it was somewhat intermittent but has gradually progressed to constant sense of numbness.  She does find that if she lay on her left side, it tends to make the numbness worse.  She has not had any pain in the hand or arm.  She has not had any loss of strength or dexterity.  She denies any numbness of her face.  She has bilateral tingling in her feet that could relate to diabetes.  There has been no headache or visual symptoms.      The patient did have a left middle cerebral artery stroke in 2011 that resulted in aphasia and severe right-sided weakness.  She ultimately had a right carotid endarterectomy for 85% stenosis by her recollection.  This would have not have been the symptomatic side, but she tells me the left side was \"too blocked\" to do surgery on.  She has been maintained on aspirin and Plavix since then.      She does recall after her stroke that she started using her left hand more to do things and this has continued.  She also recalls after the stroke when she slept on her left side, her left hand would feel numb and tingling, but this " ultimately resolved.      She cannot identify anything that particularly precipitated the current symptoms.  She was working at [employer] but has been off work due to the coronavirus pandemic for 2 weeks.  She does report she is doing a lot of cleaning and scrubbing around the house now that she is off work.      Her past medical history is also notable for left breast cancer diagnosed in 2018.  She has received chemotherapy and radiation therapy for this.  Additionally, she indicates she has type 2 diabetes, currently managed with diet, and the aforementioned left middle cerebral artery stroke in 2011.      CURRENT MEDICATIONS:   1.  Amlodipine.   2.  Aspirin.   3.  Atorvastatin.   4.  Calcium.   5.  Vitamin D.   6.  Plavix.   7.  Fluoxetine.   8.  Lisinopril.   9.  Metoprolol.      ALLERGIES:  SHE IS ALLERGIC TO HYDROCODONE.  SHE HAD A PARADOXICAL REACTION TO DIPHENHYDRAMINE PLUS IBUPROFEN.      The patient had been working as a  at [employer].  She quit smoking 12 years ago.  She has wine with meals.      PHYSICAL EXAMINATION:   GENERAL:  A pleasant female.  She is alert and cooperative.   VITAL SIGNS:  Heart rate 70, blood pressure 171/68 but she indicates she is very upset about her current symptoms.      She does have somewhat halting and at times effortful speech with rare word-finding difficulties, but she is easily understood.      Foraminal compression testing is negative.      She has a strongly positive Tinel sign when I percussed the left median nerve at the wrist.      CRANIAL NERVES:  Pupils are equal, round and react well to light.  Visual fields are intact and she does not extinguish double simultaneous or visual stimuli.  Extraocular motility is full.  She has bilateral hearing aids.  Otherwise, cranial nerves II-XII are intact.   MOTOR:  Intact upper and lower extremity strength bilaterally.  There is no pronator drift.  She has good dexterity in the left hand.   SENSORY:  Notable only  for mild depression of vibratory sense in the toes.  There is no sensory loss to pinprick in the left hand.  Position and vibratory sense in the hands is intact and she does not extinguish double simultaneous sensory stimuli.  Finger-to-nose is done well.   GAIT:  Unremarkable.   REFLEXES:  Slightly brisker on the right arm and right leg than the left, being 3+ on the right and 2+ on the left.  The right plantar response is extensor, left is flexor.      IMPRESSION:  Left hand paresthesias.      I suspect this is left carpal tunnel syndrome.      I did discuss the above with the patient.  I do not think that this is a central nervous system process.  I did reassure her of that.      PLAN:  She is going to try a left wrist splint to see if it helps with her symptoms.      I did order a left upper extremity EMG examination, but I advised her that this may take several weeks to have done because of the COVID-19 pandemic and the resultant delay in nonurgent procedures.  She understands this.      I did tell her if she notes a progression of her symptoms or develops any new symptoms involving things such as strength and dexterity that she needs to contact me.      I plan to get in touch with her in 4 weeks to see how she is doing.  We set up appointment for that, but will probably be a virtual visit as things stand now.      Total visit time 45 minutes.  More than 50% of this was spent in counseling and coordination of care.         WALDEMAR OMALLEY MD             D: 2020   T: 2020   MT: PK      Name:     SHAN RECINOS   MRN:      [id number]        Account:      [id number]   :      1945           Visit Date:   2020      Document: [id number]       cc: Chastity Bee MD       Again, thank you for allowing me to participate in the care of your patient.        Sincerely,        Waldemar Omalley MD

## 2020-03-26 NOTE — PROGRESS NOTES
"Visit Date:   03/26/2020      NEUROLOGY CLINIC NOTE      HISTORY OF PRESENT ILLNESS:  Bethany Patel is a 74-year-old right-handed female here for evaluation of left hand numbness and tingling.      The patient has noted this for the last 4-6 weeks.  She reports tingling and numbness involving the thumb and the second, third and fourth digits.  Initially it was somewhat intermittent but has gradually progressed to constant sense of numbness.  She does find that if she lay on her left side, it tends to make the numbness worse.  She has not had any pain in the hand or arm.  She has not had any loss of strength or dexterity.  She denies any numbness of her face.  She has bilateral tingling in her feet that could relate to diabetes.  There has been no headache or visual symptoms.      The patient did have a left middle cerebral artery stroke in 2011 that resulted in aphasia and severe right-sided weakness.  She ultimately had a right carotid endarterectomy for 85% stenosis by her recollection.  This would have not have been the symptomatic side, but she tells me the left side was \"too blocked\" to do surgery on.  She has been maintained on aspirin and Plavix since then.      She does recall after her stroke that she started using her left hand more to do things and this has continued.  She also recalls after the stroke when she slept on her left side, her left hand would feel numb and tingling, but this ultimately resolved.      She cannot identify anything that particularly precipitated the current symptoms.  She was working at [employer] but has been off work due to the coronavirus pandemic for 2 weeks.  She does report she is doing a lot of cleaning and scrubbing around the house now that she is off work.      Her past medical history is also notable for left breast cancer diagnosed in 2018.  She has received chemotherapy and radiation therapy for this.  Additionally, she indicates she has type 2 diabetes, currently " managed with diet, and the aforementioned left middle cerebral artery stroke in 2011.      CURRENT MEDICATIONS:   1.  Amlodipine.   2.  Aspirin.   3.  Atorvastatin.   4.  Calcium.   5.  Vitamin D.   6.  Plavix.   7.  Fluoxetine.   8.  Lisinopril.   9.  Metoprolol.      ALLERGIES:  SHE IS ALLERGIC TO HYDROCODONE.  SHE HAD A PARADOXICAL REACTION TO DIPHENHYDRAMINE PLUS IBUPROFEN.      The patient had been working as a  at [employer].  She quit smoking 12 years ago.  She has wine with meals.      PHYSICAL EXAMINATION:   GENERAL:  A pleasant female.  She is alert and cooperative.   VITAL SIGNS:  Heart rate 70, blood pressure 171/68 but she indicates she is very upset about her current symptoms.      She does have somewhat halting and at times effortful speech with rare word-finding difficulties, but she is easily understood.      Foraminal compression testing is negative.      She has a strongly positive Tinel sign when I percussed the left median nerve at the wrist.      CRANIAL NERVES:  Pupils are equal, round and react well to light.  Visual fields are intact and she does not extinguish double simultaneous or visual stimuli.  Extraocular motility is full.  She has bilateral hearing aids.  Otherwise, cranial nerves II-XII are intact.   MOTOR:  Intact upper and lower extremity strength bilaterally.  There is no pronator drift.  She has good dexterity in the left hand.   SENSORY:  Notable only for mild depression of vibratory sense in the toes.  There is no sensory loss to pinprick in the left hand.  Position and vibratory sense in the hands is intact and she does not extinguish double simultaneous sensory stimuli.  Finger-to-nose is done well.   GAIT:  Unremarkable.   REFLEXES:  Slightly brisker on the right arm and right leg than the left, being 3+ on the right and 2+ on the left.  The right plantar response is extensor, left is flexor.      IMPRESSION:  Left hand paresthesias.      I suspect this is left  carpal tunnel syndrome.      I did discuss the above with the patient.  I do not think that this is a central nervous system process.  I did reassure her of that.      PLAN:  She is going to try a left wrist splint to see if it helps with her symptoms.      I did order a left upper extremity EMG examination, but I advised her that this may take several weeks to have done because of the COVID-19 pandemic and the resultant delay in nonurgent procedures.  She understands this.      I did tell her if she notes a progression of her symptoms or develops any new symptoms involving things such as strength and dexterity that she needs to contact me.      I plan to get in touch with her in 4 weeks to see how she is doing.  We set up appointment for that, but will probably be a virtual visit as things stand now.      Total visit time 45 minutes.  More than 50% of this was spent in counseling and coordination of care.         WALDEMAR TAYLOR MD             D: 2020   T: 2020   MT: COURTNEY      Name:     SHAN RECINOS   MRN:      [id number]        Account:      [id number]   :      1945           Visit Date:   2020      Document: [id number]       cc: Chastity Bee MD

## 2020-03-26 NOTE — PROGRESS NOTES
Radha Patel's goals for this visit include: consult  She requests these members of her care team be copied on today's visit information:     PCP: [place], [facility]    Referring Provider:  Chastity Bee MD  [address] [place] [address]

## 2020-04-21 ENCOUNTER — ANCILLARY PROCEDURE (OUTPATIENT)
Dept: CT IMAGING | Facility: CLINIC | Age: 75
End: 2020-04-21
Attending: RADIOLOGY
Payer: MEDICARE

## 2020-04-21 DIAGNOSIS — R91.8 PULMONARY NODULES: ICD-10-CM

## 2020-04-21 PROCEDURE — 71250 CT THORAX DX C-: CPT | Performed by: RADIOLOGY

## 2020-04-23 ENCOUNTER — VIRTUAL VISIT (OUTPATIENT)
Dept: NEUROLOGY | Facility: CLINIC | Age: 75
End: 2020-04-23
Payer: MEDICARE

## 2020-04-23 DIAGNOSIS — G56.02 CARPAL TUNNEL SYNDROME OF LEFT WRIST: Primary | ICD-10-CM

## 2020-04-23 PROCEDURE — 99441 ZZC PHYSICIAN TELEPHONE EVALUATION 5-10 MIN: CPT | Performed by: PSYCHIATRY & NEUROLOGY

## 2020-04-23 NOTE — NURSING NOTE
Radha Patel's goals for this visit include: recheck  She requests these members of her care team be copied on today's visit information:     PCP: [facility]    Referring Provider:  No referring provider defined for this encounter.    There were no vitals taken for this visit.    Do you need any medication refills at today's visit? No

## 2020-04-23 NOTE — PROGRESS NOTES
Visit Date:   2020      This is a telephone followup visit.  This visit is being done via telephone because of the COVID- pandemic.      Shan Recinos is a patient I saw formally a month ago for evaluation of left hand paresthesias.  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  After the stroke she tended to rely on her left hand more to do things.  She also has a history of left breast cancer diagnosed in 2018 and she received chemotherapy and radiation therapy for this.  In addition, she has type 2 diabetes managed by diet.      When I saw her a month ago, I felt her left hand symptoms were likely related to left carpal tunnel syndrome.      She was provided with a wrist splint and she is doing much better.  She can sleep a lot better wearing the splint.  She takes it off during the day because she finds it cumbersome but tries to wear it as much as possible.  She reports no worsening of her symptoms.  She reports no loss of strength and no new neurologic symptoms.      She has been set up for an EMG examination, but this probably will not be done until July because of the COVID- pandemic.      IMPRESSION:  Left hand paresthesias, likely related to left carpal tunnel syndrome.      PLAN:  She will continue to utilize the splint which has been very helpful.      As noted above, an EMG examination is planned after COVID-19, which hopefully will be done in July.      I have asked her to contact me if she has any new or worsening neurologic symptoms.  Otherwise, I will give her a call when I have the results of the EMG study.      ADDENDUM:  Total telephone time was 5 minutes from 11:18 a.m. until 11:23 a.m.         WALDEMAR TAYLOR MD             D: 2020   T: 2020   MT: [place]      Name:     SHAN RECINOS   MRN:      [id number]        Account:      [id number]   :      1945           Visit Date:   2020      Document: [id number]

## 2020-04-23 NOTE — PROGRESS NOTES
"Rdaha Patel is a 74 year old female who is being evaluated via a billable telephone visit.      The patient has been notified of following:     \"This telephone visit will be conducted via a call between you and your physician/provider. We have found that certain health care needs can be provided without the need for a physical exam.  This service lets us provide the care you need with a short phone conversation.  If a prescription is necessary we can send it directly to your pharmacy.  If lab work is needed we can place an order for that and you can then stop by our lab to have the test done at a later time.    Telephone visits are billed at different rates depending on your insurance coverage. During this emergency period, for some insurers they may be billed the same as an in-person visit.  Please reach out to your insurance provider with any questions.    If during the course of the call the physician/provider feels a telephone visit is not appropriate, you will not be charged for this service.\"    Patient has given verbal consent for Telephone visit?  Yes    How would you like to obtain your AVS? Mail a copy        "

## 2020-04-27 ENCOUNTER — VIRTUAL VISIT (OUTPATIENT)
Dept: RADIATION ONCOLOGY | Facility: CLINIC | Age: 75
End: 2020-04-27
Payer: MEDICARE

## 2020-04-27 DIAGNOSIS — R91.8 PULMONARY NODULES: Primary | ICD-10-CM

## 2020-04-27 DIAGNOSIS — Z87.891 SMOKING HISTORY: ICD-10-CM

## 2020-04-27 DIAGNOSIS — C50.912 INVASIVE DUCTAL CARCINOMA OF LEFT BREAST (H): ICD-10-CM

## 2020-04-27 PROCEDURE — G2012 BRIEF CHECK IN BY MD/QHP: HCPCS | Performed by: RADIOLOGY

## 2020-04-27 NOTE — PATIENT INSTRUCTIONS
Please contact [facility] RN with questions or concerns following today's appointment: [phone].    Thank you!

## 2020-04-27 NOTE — NURSING NOTE
"Radha Patel is a 74 year old female who is being evaluated via a billable telephone visit.      The patient has been notified of following:     \"This telephone visit will be conducted via a call between you and your physician/provider. We have found that certain health care needs can be provided without the need for a physical exam.  This service lets us provide the care you need with a short phone conversation.  If a prescription is necessary we can send it directly to your pharmacy.  If lab work is needed we can place an order for that and you can then stop by our lab to have the test done at a later time.    Telephone visits are billed at different rates depending on your insurance coverage. During this emergency period, for some insurers they may be billed the same as an in-person visit.  Please reach out to your insurance provider with any questions.    If during the course of the call the physician/provider feels a telephone visit is not appropriate, you will not be charged for this service.\"    Patient has given verbal consent for Telephone visit?  Yes    How would you like to obtain your AVS? Mail a copy FOLLOW-UP VISIT    Patient Name: Radha Patel      : 1945     Age: 74 year old        ______________________________________________________________________________     Chief Complaint: Telephone return visit with Dr. Boateng    There were no vitals taken for this visit.     Date Radiation Completed: 10/2/2018    Pain  Denies    Meds  Current Med List Reviewed: Yes  Medication Note:     Skin: Warm  Dry  Intact    Range of Motion: Full    Respiratory: No shortness of breath, dyspnea on exertion, cough, or hemoptysis    Hormone Therapy: Yes    Lymphedema Follow up: No    Energy Level: normal      Appointments:     DATE  Oncologist: Dr. Bee   20   Surgeon:    Primary:      Other Notes:     Phone call duration: 10 minutes    Mallory Hays RN      "
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
## 2020-08-03 DIAGNOSIS — C50.912 INVASIVE DUCTAL CARCINOMA OF LEFT BREAST (H): ICD-10-CM

## 2020-08-03 DIAGNOSIS — Z79.811 AROMATASE INHIBITOR USE: ICD-10-CM

## 2020-08-03 DIAGNOSIS — M81.8 OTHER OSTEOPOROSIS WITHOUT CURRENT PATHOLOGICAL FRACTURE: ICD-10-CM

## 2020-08-04 RX ORDER — ANASTROZOLE 1 MG/1
1 TABLET ORAL DAILY
Qty: 90 TABLET | Refills: 3 | Status: SHIPPED | OUTPATIENT
Start: 2020-08-04 | End: 2021-08-24

## 2020-08-10 ENCOUNTER — OFFICE VISIT (OUTPATIENT)
Dept: NEUROLOGY | Facility: CLINIC | Age: 75
End: 2020-08-10
Payer: MEDICARE

## 2020-08-10 DIAGNOSIS — R20.0 NUMBNESS AND TINGLING IN LEFT HAND: ICD-10-CM

## 2020-08-10 DIAGNOSIS — G56.02 CARPAL TUNNEL SYNDROME OF LEFT WRIST: Primary | ICD-10-CM

## 2020-08-10 DIAGNOSIS — R20.2 NUMBNESS AND TINGLING IN LEFT HAND: ICD-10-CM

## 2020-08-10 PROCEDURE — 95909 NRV CNDJ TST 5-6 STUDIES: CPT | Performed by: PSYCHIATRY & NEUROLOGY

## 2020-08-10 NOTE — PROGRESS NOTES
[facility]   EMG Laboratory      Nerve Conduction & EMG Report          Patient:       Radha Patel  Patient ID:    [id number]  Gender:        Female  YOB: 1945  Age:           74 Years 9 Months      History and Examination:  Radha Patel is a 74 year old woman with numbness in the first four digits of the left hand and wrist discomfort radiating proximally. Past history is notable for left-sided breast cancer with lymph node resection and radiation therapy. Examination demonstrates relative preservation of thumb abduction strength. She is referred for evaluation of possible median neuropathy at the wrist.     Techniques:  Motor conduction studies were done with surface recording electrodes. Sensory conduction studies were performed with surface electrodes, unless indicated otherwise by (n), designating the use of subdermal recording electrodes. Temperature was monitored and recorded throughout the study. Upper extremities were maintained at a temperature of 32 degrees Centigrade or higher.  EMG was deferred because of prior lymph node resections.     Results:  The left median sensory nerve action potential was absent. The left ulnar sensory nerve action potential was mildly attenuated. A left radial sensory conduction study was normal. Left median motor conduction studies demonstrated marked attenuation of amplitude and marked prolongation of distal latencies. Partial or predominant ulnar innervation of typically median-innervated thenar muscles may be present. A left ulnar motor conduction study was normal.     Interpretation:  This is an abnormal study, demonstrating electrophysiologic evidence of the followin. 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 See comment.  2. Minimal non-localizing ulnar sensory neuropathy.    Comment:  Needle electromyography was deferred because of prior lymph node resection. If authorized, it would be helpful to confirm localization, particularly in  this clinical context.     Quinten Clark MD        Sensory NCS      Nerve / Sites Rec. Site Onset Peak NP Amp Ref. PP Amp Dist Jesus Ref. Temp     ms ms  V  V  V cm m/s m/s  C   L MEDIAN - Dig II      Dig II Wrist NR NR NR 10.0 NR 14 NR 48.0 34   L ULNAR - Dig V      Dig V Wrist 2.08 2.66 7.4 8.0 10.6 12.5 60.0 48.0 33.9   L RADIAL - Snuff      Forearm Snuff 2.19 2.76 18.9 15.0 20.6 12 54.9 48.0 24.2       Motor NCS      Nerve / Sites Rec. Site Lat Ref. Amp Ref. Rel Amp Dist Jesus Ref. Dur. Area Temp.     ms ms mV mV % cm m/s m/s ms %  C   L MEDIAN - APB      Wrist APB 12.03 4.40 0.1 5.0 100 8   8.13  33.8      Elbow APB 15.16  0.1  87.2 20 64.0 48.0 8.96  33.8      Wrist APB 9.43  0.9  1531    8.91  33.8      Elbow APB 12.81  0.6  1021 19 56.1  7.81  33.6      Wrist (uln) APB 2.76  10.6  14254    7.55  33.6   L ULNAR - ADM      Wrist ADM 2.34 3.50 7.3 5.0 100 8   6.30 100 33.6      B.Elbow ADM 5.94  6.2  84.9 21 58.4 48.0 6.82 97 33.6      A.Elbow ADM 7.40  6.2  84.7 8 54.9 48.0 6.82 96.7 33.6   L MEDIAN - II Lumb      Median II Lumb 6.04  0.7  100 10   6.04 100 34.3      Ulnar Palm Int 3.02  3.3  439 10   5.36 344 34.3       F  Wave      Nerve Min F Lat Max F Lat Mean FLat Temp.    ms ms ms  C   L ULNAR 25.63 28.54 26.69 33.8

## 2020-08-10 NOTE — Clinical Note
Dr Bee - I recommend adding needle EMG if safe to do so, but would like oncology authorization for needle testing as she has had prior lymph node resection. As you know the infection risk from needle EMG is typically quite low but I prefer to ask first in this clinical setting. Thanks. Quinten

## 2020-08-10 NOTE — LETTER
8/10/2020         RE: Radha Patel  [address]  [place] [address]        Dear Colleague,    Thank you for referring your patient, Radha Patel, to the [facility]. Please see a copy of my visit note below.    [facility]   EMG Laboratory      Nerve Conduction & EMG Report          Patient:       Radha Patel  Patient ID:    [id number]  Gender:        Female  YOB: 1945  Age:           74 Years 9 Months      History and Examination:  Radha Patel is a 74 year old woman with numbness in the first four digits of the left hand and wrist discomfort radiating proximally. Past history is notable for left-sided breast cancer with lymph node resection and radiation therapy. Examination demonstrates relative preservation of thumb abduction strength. She is referred for evaluation of possible median neuropathy at the wrist.     Techniques:  Motor conduction studies were done with surface recording electrodes. Sensory conduction studies were performed with surface electrodes, unless indicated otherwise by (n), designating the use of subdermal recording electrodes. Temperature was monitored and recorded throughout the study. Upper extremities were maintained at a temperature of 32 degrees Centigrade or higher.  EMG was deferred because of prior lymph node resections.     Results:  The left median sensory nerve action potential was absent. The left ulnar sensory nerve action potential was mildly attenuated. A left radial sensory conduction study was normal. Left median motor conduction studies demonstrated marked attenuation of amplitude and marked prolongation of distal latencies. Partial or predominant ulnar innervation of typically median-innervated thenar muscles may be present. A left ulnar motor conduction study was normal.     Interpretation:  This is an abnormal study, demonstrating electrophysiologic evidence of the followin. Severe median neuropathy at the  wrist. See comment.  2. Minimal non-localizing ulnar sensory neuropathy.    Comment:  Needle electromyography was deferred because of prior lymph node resection. If authorized, it would be helpful to confirm localization, particularly in this clinical context.     Quinten Clark MD        Sensory NCS      Nerve / Sites Rec. Site Onset Peak NP Amp Ref. PP Amp Dist Jesus Ref. Temp     ms ms  V  V  V cm m/s m/s  C   L MEDIAN - Dig II      Dig II Wrist NR NR NR 10.0 NR 14 NR 48.0 34   L ULNAR - Dig V      Dig V Wrist 2.08 2.66 7.4 8.0 10.6 12.5 60.0 48.0 33.9   L RADIAL - Snuff      Forearm Snuff 2.19 2.76 18.9 15.0 20.6 12 54.9 48.0 24.2       Motor NCS      Nerve / Sites Rec. Site Lat Ref. Amp Ref. Rel Amp Dist Ejsus Ref. Dur. Area Temp.     ms ms mV mV % cm m/s m/s ms %  C   L MEDIAN - APB      Wrist APB 12.03 4.40 0.1 5.0 100 8   8.13  33.8      Elbow APB 15.16  0.1  87.2 20 64.0 48.0 8.96  33.8      Wrist APB 9.43  0.9  1531    8.91  33.8      Elbow APB 12.81  0.6  1021 19 56.1  7.81  33.6      Wrist (uln) APB 2.76  10.6  90949    7.55  33.6   L ULNAR - ADM      Wrist ADM 2.34 3.50 7.3 5.0 100 8   6.30 100 33.6      B.Elbow ADM 5.94  6.2  84.9 21 58.4 48.0 6.82 97 33.6      A.Elbow ADM 7.40  6.2  84.7 8 54.9 48.0 6.82 96.7 33.6   L MEDIAN - II Lumb      Median II Lumb 6.04  0.7  100 10   6.04 100 34.3      Ulnar Palm Int 3.02  3.3  439 10   5.36 344 34.3       F  Wave      Nerve Min F Lat Max F Lat Mean FLat Temp.    ms ms ms  C   L ULNAR 25.63 28.54 26.69 33.8                          Again, thank you for allowing me to participate in the care of your patient.        Sincerely,        Quinten Clrak MD

## 2020-08-12 ENCOUNTER — TELEPHONE (OUTPATIENT)
Dept: NEUROLOGY | Facility: CLINIC | Age: 75
End: 2020-08-12

## 2020-08-12 DIAGNOSIS — G56.02 CARPAL TUNNEL SYNDROME OF LEFT WRIST: Primary | ICD-10-CM

## 2020-08-12 NOTE — TELEPHONE ENCOUNTER
Discussed EMG with patient. Finding of severe Left CTS. She is interested in a surgical opinion. Placed referral for Dr ROMI Sun or Dr Benavidez

## 2020-08-13 NOTE — TELEPHONE ENCOUNTER
DIAGNOSIS: carpal tunnel syndrome of left wrist   APPOINTMENT DATE: 9.2.20   NOTES STATUS DETAILS   OFFICE NOTE from referring provider Internal 8.12.20 MAYKEL Bradford [facility] Neuro    4.23.20    OFFICE NOTE from other specialist Internal 8.10.20 MAYKEL Lama [facility] Neuro   DISCHARGE SUMMARY from hospital N/A    DISCHARGE REPORT from the ER N/A    OPERATIVE REPORT N/A    MEDICATION LIST Internal    EMG (for Spine) N/A    IMPLANT RECORD/STICKER N/A    LABS     CBC/DIFF Internal    CULTURES N/A    INJECTIONS DONE IN RADIOLOGY N/A    MRI N/A    CT SCAN N/A    XRAYS (IMAGES & REPORTS) N/A    TUMOR     PATHOLOGY  Slides & report N/A

## 2020-08-21 ENCOUNTER — INFUSION THERAPY VISIT (OUTPATIENT)
Dept: INFUSION THERAPY | Facility: CLINIC | Age: 75
End: 2020-08-21
Payer: MEDICARE

## 2020-08-21 ENCOUNTER — ONCOLOGY VISIT (OUTPATIENT)
Dept: ONCOLOGY | Facility: CLINIC | Age: 75
End: 2020-08-21
Payer: MEDICARE

## 2020-08-21 VITALS
SYSTOLIC BLOOD PRESSURE: 178 MMHG | TEMPERATURE: 97.3 F | OXYGEN SATURATION: 97 % | DIASTOLIC BLOOD PRESSURE: 77 MMHG | WEIGHT: 129.41 LBS | HEART RATE: 74 BPM | RESPIRATION RATE: 18 BRPM | BODY MASS INDEX: 21.53 KG/M2

## 2020-08-21 VITALS
WEIGHT: 129.4 LBS | RESPIRATION RATE: 18 BRPM | TEMPERATURE: 97.3 F | HEART RATE: 74 BPM | SYSTOLIC BLOOD PRESSURE: 178 MMHG | DIASTOLIC BLOOD PRESSURE: 77 MMHG | BODY MASS INDEX: 21.53 KG/M2 | OXYGEN SATURATION: 97 %

## 2020-08-21 DIAGNOSIS — Z12.31 VISIT FOR SCREENING MAMMOGRAM: ICD-10-CM

## 2020-08-21 DIAGNOSIS — C50.912 INVASIVE DUCTAL CARCINOMA OF LEFT BREAST (H): ICD-10-CM

## 2020-08-21 DIAGNOSIS — R91.8 PULMONARY NODULES: ICD-10-CM

## 2020-08-21 DIAGNOSIS — Z79.811 AROMATASE INHIBITOR USE: ICD-10-CM

## 2020-08-21 DIAGNOSIS — M81.8 OTHER OSTEOPOROSIS WITHOUT CURRENT PATHOLOGICAL FRACTURE: Primary | ICD-10-CM

## 2020-08-21 LAB
ALBUMIN SERPL-MCNC: 3.8 G/DL (ref 3.4–5)
ALP SERPL-CCNC: 56 U/L (ref 40–150)
ALT SERPL W P-5'-P-CCNC: 25 U/L (ref 0–50)
ANION GAP SERPL CALCULATED.3IONS-SCNC: 4 MMOL/L (ref 3–14)
AST SERPL W P-5'-P-CCNC: 12 U/L (ref 0–45)
BASOPHILS # BLD AUTO: 0 10E9/L (ref 0–0.2)
BASOPHILS NFR BLD AUTO: 0.5 %
BILIRUB SERPL-MCNC: 0.4 MG/DL (ref 0.2–1.3)
BUN SERPL-MCNC: 16 MG/DL (ref 7–30)
CALCIUM SERPL-MCNC: 10.4 MG/DL (ref 8.5–10.1)
CHLORIDE SERPL-SCNC: 112 MMOL/L (ref 94–109)
CO2 SERPL-SCNC: 30 MMOL/L (ref 20–32)
CREAT SERPL-MCNC: 0.83 MG/DL (ref 0.52–1.04)
DIFFERENTIAL METHOD BLD: ABNORMAL
EOSINOPHIL # BLD AUTO: 0.2 10E9/L (ref 0–0.7)
EOSINOPHIL NFR BLD AUTO: 2.4 %
ERYTHROCYTE [DISTWIDTH] IN BLOOD BY AUTOMATED COUNT: 12.9 % (ref 10–15)
GFR SERPL CREATININE-BSD FRML MDRD: 68 ML/MIN/{1.73_M2}
GLUCOSE SERPL-MCNC: 101 MG/DL (ref 70–99)
HCT VFR BLD AUTO: 35.8 % (ref 35–47)
HGB BLD-MCNC: 11.9 G/DL (ref 11.7–15.7)
IMM GRANULOCYTES # BLD: 0 10E9/L (ref 0–0.4)
IMM GRANULOCYTES NFR BLD: 0.5 %
LYMPHOCYTES # BLD AUTO: 1 10E9/L (ref 0.8–5.3)
LYMPHOCYTES NFR BLD AUTO: 15.3 %
MCH RBC QN AUTO: 31.4 PG (ref 26.5–33)
MCHC RBC AUTO-ENTMCNC: 33.2 G/DL (ref 31.5–36.5)
MCV RBC AUTO: 95 FL (ref 78–100)
MONOCYTES # BLD AUTO: 0.7 10E9/L (ref 0–1.3)
MONOCYTES NFR BLD AUTO: 11.9 %
NEUTROPHILS # BLD AUTO: 4.3 10E9/L (ref 1.6–8.3)
NEUTROPHILS NFR BLD AUTO: 69.4 %
PLATELET # BLD AUTO: 195 10E9/L (ref 150–450)
POTASSIUM SERPL-SCNC: 4.6 MMOL/L (ref 3.4–5.3)
PROT SERPL-MCNC: 7.2 G/DL (ref 6.8–8.8)
RBC # BLD AUTO: 3.79 10E12/L (ref 3.8–5.2)
SODIUM SERPL-SCNC: 146 MMOL/L (ref 133–144)
WBC # BLD AUTO: 6.2 10E9/L (ref 4–11)

## 2020-08-21 PROCEDURE — 85025 COMPLETE CBC W/AUTO DIFF WBC: CPT | Performed by: INTERNAL MEDICINE

## 2020-08-21 PROCEDURE — 36415 COLL VENOUS BLD VENIPUNCTURE: CPT | Performed by: INTERNAL MEDICINE

## 2020-08-21 PROCEDURE — 99207 ZZC NO CHARGE NURSE ONLY: CPT

## 2020-08-21 PROCEDURE — 99214 OFFICE O/P EST MOD 30 MIN: CPT | Mod: 25 | Performed by: INTERNAL MEDICINE

## 2020-08-21 PROCEDURE — 96372 THER/PROPH/DIAG INJ SC/IM: CPT | Performed by: INTERNAL MEDICINE

## 2020-08-21 PROCEDURE — 80053 COMPREHEN METABOLIC PANEL: CPT | Performed by: INTERNAL MEDICINE

## 2020-08-21 RX ORDER — SODIUM CHLORIDE 9 MG/ML
1000 INJECTION, SOLUTION INTRAVENOUS CONTINUOUS PRN
Status: CANCELLED
Start: 2021-02-26

## 2020-08-21 RX ORDER — DIPHENHYDRAMINE HYDROCHLORIDE 50 MG/ML
50 INJECTION INTRAMUSCULAR; INTRAVENOUS
Status: CANCELLED
Start: 2021-02-26

## 2020-08-21 RX ORDER — EPINEPHRINE 0.3 MG/.3ML
0.3 INJECTION SUBCUTANEOUS EVERY 5 MIN PRN
Status: CANCELLED | OUTPATIENT
Start: 2020-08-21

## 2020-08-21 RX ORDER — ALBUTEROL SULFATE 90 UG/1
1-2 AEROSOL, METERED RESPIRATORY (INHALATION)
Status: CANCELLED
Start: 2020-08-21

## 2020-08-21 RX ORDER — EPINEPHRINE 0.3 MG/.3ML
0.3 INJECTION SUBCUTANEOUS EVERY 5 MIN PRN
Status: CANCELLED | OUTPATIENT
Start: 2021-02-26

## 2020-08-21 RX ORDER — MEPERIDINE HYDROCHLORIDE 25 MG/ML
25 INJECTION INTRAMUSCULAR; INTRAVENOUS; SUBCUTANEOUS EVERY 30 MIN PRN
Status: CANCELLED | OUTPATIENT
Start: 2021-02-26

## 2020-08-21 RX ORDER — ALBUTEROL SULFATE 90 UG/1
1-2 AEROSOL, METERED RESPIRATORY (INHALATION)
Status: CANCELLED
Start: 2021-02-26

## 2020-08-21 RX ORDER — METHYLPREDNISOLONE SODIUM SUCCINATE 125 MG/2ML
125 INJECTION, POWDER, LYOPHILIZED, FOR SOLUTION INTRAMUSCULAR; INTRAVENOUS
Status: CANCELLED
Start: 2020-08-21

## 2020-08-21 RX ORDER — ALBUTEROL SULFATE 0.83 MG/ML
2.5 SOLUTION RESPIRATORY (INHALATION)
Status: CANCELLED | OUTPATIENT
Start: 2021-02-26

## 2020-08-21 RX ORDER — MEPERIDINE HYDROCHLORIDE 25 MG/ML
25 INJECTION INTRAMUSCULAR; INTRAVENOUS; SUBCUTANEOUS EVERY 30 MIN PRN
Status: CANCELLED | OUTPATIENT
Start: 2020-08-21

## 2020-08-21 RX ORDER — AMLODIPINE BESYLATE 5 MG/1
10 TABLET ORAL
COMMUNITY
Start: 2020-05-12 | End: 2021-05-12

## 2020-08-21 RX ORDER — EPINEPHRINE 1 MG/ML
0.3 INJECTION, SOLUTION INTRAMUSCULAR; SUBCUTANEOUS EVERY 5 MIN PRN
Status: CANCELLED | OUTPATIENT
Start: 2020-08-21

## 2020-08-21 RX ORDER — ALBUTEROL SULFATE 0.83 MG/ML
2.5 SOLUTION RESPIRATORY (INHALATION)
Status: CANCELLED | OUTPATIENT
Start: 2020-08-21

## 2020-08-21 RX ORDER — METHYLPREDNISOLONE SODIUM SUCCINATE 125 MG/2ML
125 INJECTION, POWDER, LYOPHILIZED, FOR SOLUTION INTRAMUSCULAR; INTRAVENOUS
Status: CANCELLED
Start: 2021-02-26

## 2020-08-21 RX ORDER — DIPHENHYDRAMINE HYDROCHLORIDE 50 MG/ML
50 INJECTION INTRAMUSCULAR; INTRAVENOUS
Status: CANCELLED
Start: 2020-08-21

## 2020-08-21 RX ORDER — EPINEPHRINE 1 MG/ML
0.3 INJECTION, SOLUTION INTRAMUSCULAR; SUBCUTANEOUS EVERY 5 MIN PRN
Status: CANCELLED | OUTPATIENT
Start: 2021-02-26

## 2020-08-21 RX ORDER — SODIUM CHLORIDE 9 MG/ML
1000 INJECTION, SOLUTION INTRAVENOUS CONTINUOUS PRN
Status: CANCELLED
Start: 2020-08-21

## 2020-08-21 ASSESSMENT — PAIN SCALES - GENERAL: PAINLEVEL: WORST PAIN (10)

## 2020-08-21 NOTE — NURSING NOTE
"Oncology Rooming Note    August 21, 2020 12:03 PM   Radha Patel is a 74 year old female who presents for:    Chief Complaint   Patient presents with     Oncology Clinic Visit     6 month follow up     Initial Vitals: BP (!) 178/77 (BP Location: Right arm)   Pulse 74   Temp 97.3  F (36.3  C) (Oral)   Resp 18   Wt 58.7 kg (129 lb 6.4 oz)   SpO2 97%   BMI 21.53 kg/m   Estimated body mass index is 21.53 kg/m  as calculated from the following:    Height as of 3/26/20: 1.651 m (5' 5\").    Weight as of this encounter: 58.7 kg (129 lb 6.4 oz). Body surface area is 1.64 meters squared.  Worst Pain (10) Comment: Data Unavailable   No LMP recorded. Patient has had a hysterectomy.  Allergies reviewed: Yes  Medications reviewed: Yes    Medications: MEDICATION REFILLS NEEDED TODAY. Provider was notified.  Pharmacy name entered into "[employer]": [id number] - [place], [place] - [address] [place]    Lucille Nguyen LPN              "
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
## 2020-08-21 NOTE — PROGRESS NOTES
Infusion Nursing Note:  Radha Patel presents today for Prolia injection.    Patient seen by provider today: Yes: Dr. Bee   present during visit today: Not Applicable.    Note: Patient continues to take Calcium and Vitamin D daily as directed. Denies jaw pain, nor any upcoming dental procedures.    Intravenous Access:  No Intravenous access/labs at this visit.    Treatment Conditions:  Lab Results   Component Value Date     08/21/2020                   Lab Results   Component Value Date    POTASSIUM 4.6 08/21/2020           No results found for: MAG         Lab Results   Component Value Date    CR 0.83 08/21/2020                   Lab Results   Component Value Date    BERTIN 10.4 08/21/2020                Lab Results   Component Value Date    BILITOTAL 0.4 08/21/2020           Lab Results   Component Value Date    ALBUMIN 3.8 08/21/2020                    Lab Results   Component Value Date    ALT 25 08/21/2020           Lab Results   Component Value Date    AST 12 08/21/2020       Results reviewed, labs MET treatment parameters, ok to proceed with treatment.      Post Infusion Assessment:  Patient tolerated injection without incident.  Site patent and intact, free from redness, edema or discomfort.       Discharge Plan:   Patient discharged in stable condition accompanied by: self.  Departure Mode: Ambulatory.    Lucille Nguyen LPN

## 2020-09-02 ENCOUNTER — PRE VISIT (OUTPATIENT)
Dept: ORTHOPEDICS | Facility: CLINIC | Age: 75
End: 2020-09-02

## 2021-02-03 ENCOUNTER — TELEPHONE (OUTPATIENT)
Dept: ONCOLOGY | Facility: CLINIC | Age: 76
End: 2021-02-03

## 2021-02-03 NOTE — TELEPHONE ENCOUNTER
Patient had asked Dr Bee if she could receive covid vaccination at her office visit 2/26/21. Left vm for patient stating she will need to make an appointment through [email or web address].  Cami Leach  RN, BSN, OCN

## 2021-02-26 ENCOUNTER — ANCILLARY PROCEDURE (OUTPATIENT)
Dept: MAMMOGRAPHY | Facility: CLINIC | Age: 76
End: 2021-02-26
Attending: INTERNAL MEDICINE
Payer: MEDICARE

## 2021-02-26 ENCOUNTER — INFUSION THERAPY VISIT (OUTPATIENT)
Dept: INFUSION THERAPY | Facility: CLINIC | Age: 76
End: 2021-02-26
Payer: MEDICARE

## 2021-02-26 ENCOUNTER — ONCOLOGY VISIT (OUTPATIENT)
Dept: ONCOLOGY | Facility: CLINIC | Age: 76
End: 2021-02-26
Payer: MEDICARE

## 2021-02-26 ENCOUNTER — ANCILLARY PROCEDURE (OUTPATIENT)
Dept: CT IMAGING | Facility: CLINIC | Age: 76
End: 2021-02-26
Attending: INTERNAL MEDICINE
Payer: MEDICARE

## 2021-02-26 VITALS
RESPIRATION RATE: 18 BRPM | DIASTOLIC BLOOD PRESSURE: 62 MMHG | HEART RATE: 71 BPM | SYSTOLIC BLOOD PRESSURE: 130 MMHG | OXYGEN SATURATION: 98 % | TEMPERATURE: 97.4 F | BODY MASS INDEX: 21.81 KG/M2 | WEIGHT: 130.9 LBS | HEIGHT: 65 IN

## 2021-02-26 VITALS
DIASTOLIC BLOOD PRESSURE: 62 MMHG | RESPIRATION RATE: 18 BRPM | BODY MASS INDEX: 21.81 KG/M2 | SYSTOLIC BLOOD PRESSURE: 130 MMHG | OXYGEN SATURATION: 98 % | WEIGHT: 130.9 LBS | HEART RATE: 71 BPM | HEIGHT: 65 IN | TEMPERATURE: 97.4 F

## 2021-02-26 DIAGNOSIS — C50.912 INVASIVE DUCTAL CARCINOMA OF LEFT BREAST (H): ICD-10-CM

## 2021-02-26 DIAGNOSIS — M81.8 OTHER OSTEOPOROSIS WITHOUT CURRENT PATHOLOGICAL FRACTURE: Primary | ICD-10-CM

## 2021-02-26 DIAGNOSIS — Z12.31 VISIT FOR SCREENING MAMMOGRAM: ICD-10-CM

## 2021-02-26 DIAGNOSIS — R91.8 PULMONARY NODULES: ICD-10-CM

## 2021-02-26 DIAGNOSIS — Z79.811 AROMATASE INHIBITOR USE: ICD-10-CM

## 2021-02-26 DIAGNOSIS — Z87.891 SMOKING HISTORY: ICD-10-CM

## 2021-02-26 DIAGNOSIS — E83.52 HYPERCALCEMIA: Primary | ICD-10-CM

## 2021-02-26 DIAGNOSIS — M81.8 OTHER OSTEOPOROSIS WITHOUT CURRENT PATHOLOGICAL FRACTURE: ICD-10-CM

## 2021-02-26 DIAGNOSIS — E28.39 ESTROGEN DEFICIENCY: ICD-10-CM

## 2021-02-26 LAB
ALBUMIN SERPL-MCNC: 3.9 G/DL (ref 3.4–5)
ALP SERPL-CCNC: 63 U/L (ref 40–150)
ALT SERPL W P-5'-P-CCNC: 22 U/L (ref 0–50)
ANION GAP SERPL CALCULATED.3IONS-SCNC: 4 MMOL/L (ref 3–14)
AST SERPL W P-5'-P-CCNC: 13 U/L (ref 0–45)
BILIRUB SERPL-MCNC: 0.5 MG/DL (ref 0.2–1.3)
BUN SERPL-MCNC: 22 MG/DL (ref 7–30)
CALCIUM SERPL-MCNC: 10.4 MG/DL (ref 8.5–10.1)
CHLORIDE SERPL-SCNC: 110 MMOL/L (ref 94–109)
CO2 SERPL-SCNC: 29 MMOL/L (ref 20–32)
CREAT SERPL-MCNC: 0.95 MG/DL (ref 0.52–1.04)
GFR SERPL CREATININE-BSD FRML MDRD: 58 ML/MIN/{1.73_M2}
GLUCOSE SERPL-MCNC: 92 MG/DL (ref 70–99)
POTASSIUM SERPL-SCNC: 4.1 MMOL/L (ref 3.4–5.3)
PROT SERPL-MCNC: 7.2 G/DL (ref 6.8–8.8)
PTH-INTACT SERPL-MCNC: 29 PG/ML (ref 18–80)
SODIUM SERPL-SCNC: 143 MMOL/L (ref 133–144)

## 2021-02-26 PROCEDURE — 77063 BREAST TOMOSYNTHESIS BI: CPT | Performed by: RADIOLOGY

## 2021-02-26 PROCEDURE — 96372 THER/PROPH/DIAG INJ SC/IM: CPT | Performed by: INTERNAL MEDICINE

## 2021-02-26 PROCEDURE — 82306 VITAMIN D 25 HYDROXY: CPT | Performed by: INTERNAL MEDICINE

## 2021-02-26 PROCEDURE — 36415 COLL VENOUS BLD VENIPUNCTURE: CPT | Performed by: INTERNAL MEDICINE

## 2021-02-26 PROCEDURE — 71250 CT THORAX DX C-: CPT | Mod: MG | Performed by: RADIOLOGY

## 2021-02-26 PROCEDURE — 99207 PR NO CHARGE NURSE ONLY: CPT

## 2021-02-26 PROCEDURE — 80053 COMPREHEN METABOLIC PANEL: CPT | Performed by: INTERNAL MEDICINE

## 2021-02-26 PROCEDURE — 83970 ASSAY OF PARATHORMONE: CPT | Performed by: INTERNAL MEDICINE

## 2021-02-26 PROCEDURE — G1004 CDSM NDSC: HCPCS | Mod: GC | Performed by: RADIOLOGY

## 2021-02-26 PROCEDURE — 99215 OFFICE O/P EST HI 40 MIN: CPT | Performed by: INTERNAL MEDICINE

## 2021-02-26 PROCEDURE — 77067 SCR MAMMO BI INCL CAD: CPT | Performed by: RADIOLOGY

## 2021-02-26 RX ORDER — SODIUM CHLORIDE 9 MG/ML
1000 INJECTION, SOLUTION INTRAVENOUS CONTINUOUS PRN
Status: CANCELLED
Start: 2021-08-27

## 2021-02-26 RX ORDER — EPINEPHRINE 1 MG/ML
0.3 INJECTION, SOLUTION INTRAMUSCULAR; SUBCUTANEOUS EVERY 5 MIN PRN
Status: CANCELLED | OUTPATIENT
Start: 2021-08-27

## 2021-02-26 RX ORDER — METHYLPREDNISOLONE SODIUM SUCCINATE 125 MG/2ML
125 INJECTION, POWDER, LYOPHILIZED, FOR SOLUTION INTRAMUSCULAR; INTRAVENOUS
Status: CANCELLED
Start: 2021-08-27

## 2021-02-26 RX ORDER — ALBUTEROL SULFATE 90 UG/1
1-2 AEROSOL, METERED RESPIRATORY (INHALATION)
Status: CANCELLED
Start: 2021-08-27

## 2021-02-26 RX ORDER — ALBUTEROL SULFATE 0.83 MG/ML
2.5 SOLUTION RESPIRATORY (INHALATION)
Status: CANCELLED | OUTPATIENT
Start: 2021-08-27

## 2021-02-26 RX ORDER — EPINEPHRINE 0.3 MG/.3ML
0.3 INJECTION SUBCUTANEOUS EVERY 5 MIN PRN
Status: CANCELLED | OUTPATIENT
Start: 2021-08-27

## 2021-02-26 RX ORDER — DIPHENHYDRAMINE HYDROCHLORIDE 50 MG/ML
50 INJECTION INTRAMUSCULAR; INTRAVENOUS
Status: CANCELLED
Start: 2021-08-27

## 2021-02-26 RX ORDER — MEPERIDINE HYDROCHLORIDE 25 MG/ML
25 INJECTION INTRAMUSCULAR; INTRAVENOUS; SUBCUTANEOUS EVERY 30 MIN PRN
Status: CANCELLED | OUTPATIENT
Start: 2021-08-27

## 2021-02-26 ASSESSMENT — MIFFLIN-ST. JEOR
SCORE: 1084.64
SCORE: 1084.64

## 2021-02-26 ASSESSMENT — PAIN SCALES - GENERAL
PAINLEVEL: NO PAIN (0)
PAINLEVEL: NO PAIN (0)
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
## 2021-02-26 NOTE — NURSING NOTE
"Oncology Rooming Note    February 26, 2021 2:28 PM   Radha Patel is a 75 year old female who presents for:    Chief Complaint   Patient presents with     Oncology Clinic Visit     Follow up     Initial Vitals: /62 (BP Location: Right arm)   Pulse 71   Temp 97.4  F (36.3  C) (Oral)   Resp 18   Ht 1.651 m (5' 5\")   Wt 59.4 kg (130 lb 14.4 oz)   SpO2 98%   BMI 21.78 kg/m   Estimated body mass index is 21.78 kg/m  as calculated from the following:    Height as of this encounter: 1.651 m (5' 5\").    Weight as of this encounter: 59.4 kg (130 lb 14.4 oz). Body surface area is 1.65 meters squared.  No Pain (0) Comment: Data Unavailable   No LMP recorded. Patient has had a hysterectomy.  Allergies reviewed: Yes  Medications reviewed: Yes    Medications: Medication refills not needed today.  Pharmacy name entered into [employer]: [id number] [place], [place] - [address] [place] [address]    Clinical concerns: Patient mentioned needing to schedule an appointment with Dr. Omalley because she has noticed since her stroke her lips are not centered. Dr. Harvey was notified.      Shawanda Srinivasan, [facility]            "

## 2021-02-26 NOTE — PROGRESS NOTES
Infusion Nursing Note:  Radha Patel presents today for   Chief Complaint   Patient presents with     Oncology Clinic Visit     Prolia injection   .    Patient seen by provider today: Yes: Dr. Bee   present during visit today: Not Applicable.    Note: N/A.    Intravenous Access:  No Intravenous access/labs at this visit.    Treatment Conditions:  Lab Results   Component Value Date     02/26/2021                   Lab Results   Component Value Date    POTASSIUM 4.1 02/26/2021           No results found for: MAG         Lab Results   Component Value Date    CR 0.95 02/26/2021                   Lab Results   Component Value Date    BERTIN 10.4 02/26/2021                Lab Results   Component Value Date    BILITOTAL 0.5 02/26/2021           Lab Results   Component Value Date    ALBUMIN 3.9 02/26/2021                    Lab Results   Component Value Date    ALT 22 02/26/2021           Lab Results   Component Value Date    AST 13 02/26/2021           Post Infusion Assessment:  Patient tolerated injection without incident.  Site patent and intact, free from redness, edema or discomfort.       Discharge Plan:   Patient discharged in stable condition accompanied by: self.  Departure Mode: Ambulatory.    Shawanda Srinivasan [facility]
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
## 2021-02-27 ENCOUNTER — HEALTH MAINTENANCE LETTER (OUTPATIENT)
Age: 76
End: 2021-02-27

## 2021-03-01 ENCOUNTER — IMMUNIZATION (OUTPATIENT)
Dept: PEDIATRICS | Facility: CLINIC | Age: 76
End: 2021-03-01
Payer: MEDICARE

## 2021-03-01 LAB — DEPRECATED CALCIDIOL+CALCIFEROL SERPL-MC: 44 UG/L (ref 20–75)

## 2021-03-01 PROCEDURE — 0001A PR COVID VAC PFIZER DIL RECON 30 MCG/0.3 ML IM: CPT

## 2021-03-01 PROCEDURE — 91300 PR COVID VAC PFIZER DIL RECON 30 MCG/0.3 ML IM: CPT

## 2021-03-10 ENCOUNTER — OFFICE VISIT (OUTPATIENT)
Dept: NEUROLOGY | Facility: CLINIC | Age: 76
End: 2021-03-10
Payer: MEDICARE

## 2021-03-10 VITALS
SYSTOLIC BLOOD PRESSURE: 138 MMHG | RESPIRATION RATE: 16 BRPM | HEART RATE: 72 BPM | OXYGEN SATURATION: 98 % | DIASTOLIC BLOOD PRESSURE: 72 MMHG

## 2021-03-10 DIAGNOSIS — G56.02 CARPAL TUNNEL SYNDROME OF LEFT WRIST: ICD-10-CM

## 2021-03-10 DIAGNOSIS — F40.240 CLAUSTROPHOBIA: ICD-10-CM

## 2021-03-10 DIAGNOSIS — R29.810 FACIAL WEAKNESS: Primary | ICD-10-CM

## 2021-03-10 DIAGNOSIS — Z85.3 HX: BREAST CANCER: ICD-10-CM

## 2021-03-10 DIAGNOSIS — Z86.73 HISTORY OF STROKE: ICD-10-CM

## 2021-03-10 PROCEDURE — 99214 OFFICE O/P EST MOD 30 MIN: CPT | Performed by: PSYCHIATRY & NEUROLOGY

## 2021-03-10 RX ORDER — LORAZEPAM 2 MG/1
TABLET ORAL
Qty: 2 TABLET | Refills: 0 | Status: SHIPPED | OUTPATIENT
Start: 2021-03-10

## 2021-03-10 NOTE — PATIENT INSTRUCTIONS
Arrive 1 hour early at 730 am, DO NOT TAKE medication until after you have spoken with the MRI Tech. Have  bring you due to medication. No Jewelery and you will be asked to change into a gown

## 2021-03-10 NOTE — LETTER
3/10/2021         RE: Radha Patel  [address]  [place] [address]        Dear Colleague,    Thank you for referring your patient, Radha Patel, to the [facility] [place]. Please see a copy of my visit note below.    Visit Date:   03/10/2021      Radha Patel returns for neurologic reevaluation and in particular because of concerns of right facial weakness.      I have seen Ms. Patel in the past.  Specifically, she did present to me on 03/26/2020 with left hand numbness and paresthesias and was found to have severe left carpal tunnel syndrome.  I did refer her for surgery evaluation, but she became anxious about that and never followed through with it.  She is still using a splint, but the left hand continues to bother her.      The purpose of today's visit is because of her sense over the last couple of months that her smile is asymmetric and her right face is weak.  She also reports a coldness on the right side of her face extending down to her anterior neck as well.  It is notable in 2011 that she did have a left middle cerebral artery stroke.  Her left carotid was occluded.  She subsequently underwent a right carotid endarterectomy on the asymptomatic side.  She also has a history of breast cancer.      CURRENT MEDICATIONS:  Amlodipine, Arimidex, aspirin, atorvastatin, vitamin D, Plavix, lisinopril, metoprolol.      PHYSICAL EXAMINATION:   VITAL SIGNS:  Heart rate is 72.  Blood pressure 138/72.   HEENT:  Pupils are equal, round and react well to light.   NEUROLOGIC:  She has full extraocular motility.  She has intact visual fields.  Facial sensation is normal.  She has a very mild flattening of the right nasolabial fold, which is somewhat variable.  She has good facial strength on testing otherwise.  She does have somewhat halting speech, which is a residual from her stroke.  Otherwise, cranial nerves II-XII are intact.  Motor, sensory, cerebellar and gait testing  are normal.  Reflexes are 2+, but slightly brisker in the right leg than the left.  The right plantar response is extensor, the left is flexor.      IMPRESSION:   1.  Mild right facial asymmetry.   2.  History of left middle cerebral artery stroke with left carotid occlusion.   3.  Left carpal tunnel syndrome.   4.  Breast cancer.      PLAN:  I suspect this is nothing serious, but it is of a concern given her history of prior stroke and also breast cancer.        She is going to have a head MRI scan with contrast for further evaluation.      She has decided now she would like to see an orthopedic hand specialist because of left carpal tunnel syndrome, and I did place a referral for her.      I will be contacting her with the results of the brain MRI scan when available.      TOTAL VISIT TIME:  35 minutes; this included reviewing new history, examination, counseling, and documentation.         WALDEMAR OMALLEY MD             D: 03/10/2021   T: 03/10/2021   MT: LOCO      Name:     SHAN RECINOS   MRN:      [id number]        Account:      [id number]   :      1945           Visit Date:   03/10/2021      Document: [id number]          Again, thank you for allowing me to participate in the care of your patient.        Sincerely,        Waldemar Omalley MD

## 2021-03-10 NOTE — PROGRESS NOTES
Visit Date:   03/10/2021      Radha Patel returns for neurologic reevaluation and in particular because of concerns of right facial weakness.      I have seen Ms. Patel in the past.  Specifically, she did present to me on 03/26/2020 with left hand numbness and paresthesias and was found to have severe left carpal tunnel syndrome.  I did refer her for surgery evaluation, but she became anxious about that and never followed through with it.  She is still using a splint, but the left hand continues to bother her.      The purpose of today's visit is because of her sense over the last couple of months that her smile is asymmetric and her right face is weak.  She also reports a coldness on the right side of her face extending down to her anterior neck as well.  It is notable in 2011 that she did have a left middle cerebral artery stroke.  Her left carotid was occluded.  She subsequently underwent a right carotid endarterectomy on the asymptomatic side.  She also has a history of breast cancer.      CURRENT MEDICATIONS:  Amlodipine, Arimidex, aspirin, atorvastatin, vitamin D, Plavix, lisinopril, metoprolol.      PHYSICAL EXAMINATION:   VITAL SIGNS:  Heart rate is 72.  Blood pressure 138/72.   HEENT:  Pupils are equal, round and react well to light.   NEUROLOGIC:  She has full extraocular motility.  She has intact visual fields.  Facial sensation is normal.  She has a very mild flattening of the right nasolabial fold, which is somewhat variable.  She has good facial strength on testing otherwise.  She does have somewhat halting speech, which is a residual from her stroke.  Otherwise, cranial nerves II-XII are intact.  Motor, sensory, cerebellar and gait testing are normal.  Reflexes are 2+, but slightly brisker in the right leg than the left.  The right plantar response is extensor, the left is flexor.      IMPRESSION:   1.  Mild right facial asymmetry.   2.  History of left middle cerebral artery stroke with left  carotid occlusion.   3.  Left carpal tunnel syndrome.   4.  Breast cancer.      PLAN:  I suspect this is nothing serious, but it is of a concern given her history of prior stroke and also breast cancer.        She is going to have a head MRI scan with contrast for further evaluation.      She has decided now she would like to see an orthopedic hand specialist because of left carpal tunnel syndrome, and I did place a referral for her.      I will be contacting her with the results of the brain MRI scan when available.      TOTAL VISIT TIME:  35 minutes; this included reviewing new history, examination, counseling, and documentation.     ADDENDUM 3/31/21: Head MRI shows only chronic changes from Left MCA stroke. No acute findings. No enhancing lesions. Discussed with patient.        WALDEMAR TAYLOR MD             D: 03/10/2021   T: 03/10/2021   MT: LOCO      Name:     SHAN RECINOS   MRN:      [id number]        Account:      [id number]   :      1945           Visit Date:   03/10/2021      Document: [id number]

## 2021-03-16 ENCOUNTER — OFFICE VISIT (OUTPATIENT)
Dept: NURSING | Facility: CLINIC | Age: 76
End: 2021-03-16
Payer: MEDICARE

## 2021-03-16 VITALS — WEIGHT: 132.8 LBS | BODY MASS INDEX: 22.1 KG/M2 | OXYGEN SATURATION: 98 % | HEART RATE: 73 BPM

## 2021-03-16 DIAGNOSIS — R91.8 PULMONARY NODULES: Primary | ICD-10-CM

## 2021-03-16 PROCEDURE — 94729 DIFFUSING CAPACITY: CPT | Performed by: INTERNAL MEDICINE

## 2021-03-16 PROCEDURE — 94375 RESPIRATORY FLOW VOLUME LOOP: CPT | Performed by: INTERNAL MEDICINE

## 2021-03-16 PROCEDURE — 94726 PLETHYSMOGRAPHY LUNG VOLUMES: CPT | Performed by: INTERNAL MEDICINE

## 2021-03-17 ENCOUNTER — OFFICE VISIT (OUTPATIENT)
Dept: PULMONOLOGY | Facility: CLINIC | Age: 76
End: 2021-03-17
Payer: MEDICARE

## 2021-03-17 VITALS
DIASTOLIC BLOOD PRESSURE: 68 MMHG | SYSTOLIC BLOOD PRESSURE: 136 MMHG | RESPIRATION RATE: 18 BRPM | BODY MASS INDEX: 21.99 KG/M2 | WEIGHT: 132 LBS | HEART RATE: 67 BPM | OXYGEN SATURATION: 98 % | HEIGHT: 65 IN

## 2021-03-17 DIAGNOSIS — Z87.891 SMOKING HISTORY: ICD-10-CM

## 2021-03-17 DIAGNOSIS — J43.2 CENTRILOBULAR EMPHYSEMA (H): Primary | ICD-10-CM

## 2021-03-17 DIAGNOSIS — Z87.891 PERSONAL HISTORY OF TOBACCO USE: ICD-10-CM

## 2021-03-17 DIAGNOSIS — R91.8 PULMONARY NODULES: ICD-10-CM

## 2021-03-17 PROCEDURE — 99204 OFFICE O/P NEW MOD 45 MIN: CPT | Performed by: INTERNAL MEDICINE

## 2021-03-17 PROCEDURE — G0296 VISIT TO DETERM LDCT ELIG: HCPCS | Performed by: INTERNAL MEDICINE

## 2021-03-17 RX ORDER — ALBUTEROL SULFATE 90 UG/1
2 AEROSOL, METERED RESPIRATORY (INHALATION) EVERY 6 HOURS PRN
Qty: 1 G | Refills: 0 | Status: SHIPPED | OUTPATIENT
Start: 2021-03-17 | End: 2021-06-10

## 2021-03-17 ASSESSMENT — PAIN SCALES - GENERAL: PAINLEVEL: NO PAIN (0)

## 2021-03-17 ASSESSMENT — MIFFLIN-ST. JEOR: SCORE: 1089.63

## 2021-03-17 NOTE — PATIENT INSTRUCTIONS
Lung Cancer Screening   Frequently Asked Questions  If you are at high-risk for lung cancer, getting screened with low-dose computed tomography (LDCT) every year can help save your life. This handout offers answers to some of the most common questions about lung cancer screening. If you have other questions, please call [phone] ([phone]).     What is it?  Lung cancer screening uses special X-ray technology to create an image of your lung tissue. The exam is quick and easy and takes less than 10 seconds. We don t give you any medicine or use any needles. You can eat before and after the exam. You don t need to change your clothes as long as the clothing on your chest doesn t contain metal. But, you do need to be able to hold your breath for at least 6 seconds during the exam.    What is the goal of lung cancer screening?  The goal of lung cancer screening is to save lives. Many times, lung cancer is not found until a person starts having physical symptoms. Lung cancer screening can help detect lung cancer in the earliest stages when it may be easier to treat.    Who should be screened for lung cancer?  We suggest lung cancer screening for anyone who is at high-risk for lung cancer. You are in the high-risk group if you:      are between the ages of 55 and 79, and    have smoked at least 1 pack of cigarettes a day for 30 or more years, and    still smoke or have quit within the past 15 years.    However, if you have a new cough or shortness of breath, you should talk to your doctor before being screened.    Some national lung health advocacy groups also recommend screening for people ages 50 to 79 who have smoked an average of 1 pack of cigarettes a day for 20 years. They must also have at least 1 other risk factor for lung cancer, not including exposure to secondhand smoke. Other risk factors are having had cancer in the past, emphysema, pulmonary fibrosis, COPD, a family history of lung cancer, or  exposure to certain materials such as arsenic, asbestos, beryllium, cadmium, chromium, diesel fumes, nickel, radon or silica. Your care team can help you know if you have one of these risk factors.     Why does it matter if I have symptoms?  Certain symptoms can be a sign that you have a condition in your lungs that should be checked and treated by your doctor. These symptoms include fever, chest pain, a new or changing cough, shortness of breath that you have never felt before, coughing up blood or unexplained weight loss. Having any of these symptoms can greatly affect the results of lung cancer screening.       Should all smokers get an LDCT lung cancer screening exam?  It depends. Lung cancer screening is for a very specific group of men and women who have a history of heavy smoking over a long period of time (see  Who should be screened for lung cancer  above).  I am in the high-risk group, but have been diagnosed with cancer in the past. Is LDCT lung cancer screening right for me?  In some cases, you should not have LDCT lung screening, such as when your doctor is already following your cancer with CT scan studies. Your doctor will help you decide if LDCT lung screening is right for you.  Do I need to have a screening exam every year?  Yes. If you are in the high-risk group described earlier, you should get an LDCT lung cancer screening exam every year until you are 79, or are no longer willing or able to undergo screening and possible procedures to diagnose and treat lung cancer.  How effective is LDCT at preventing death from lung cancer?  Studies have shown that LDCT lung cancer screening can lower the risk of death from lung cancer by 20 percent in people who are at high-risk.  What are the risks?  There are some risks and limitations of LDCT lung cancer screening. We want to make sure you understand the risks and benefits, so please let us know if you have any questions. Your doctor may want to talk with  you more about these risks.    Radiation exposure: As with any exam that uses radiation, there is a very small increased risk of cancer. The amount of radiation in LDCT is small--about the same amount a person would get from a mammogram. Your doctor orders the exam when he or she feels the potential benefits outweigh the risks.    False negatives: No test is perfect, including LDCT. It is possible that you may have a medical condition, including lung cancer, that is not found during your exam. This is called a false negative result.    False positives and more testing: LDCT very often finds something in the lung that could be cancer, but in fact is not. This is called a false positive result. False positive tests often cause anxiety. To make sure these findings are not cancer, you may need to have more tests. These tests will be done only if you give us permission. Sometimes patients need a treatment that can have side effects, such as a biopsy. For more information on false positives, see  What can I expect from the results?     Findings not related to lung cancer: Your LDCT exam also takes pictures of areas of your body next to your lungs. In a very small number of cases, the CT scan will show an abnormal finding in one of these areas, such as your kidneys, adrenal glands, liver or thyroid. This finding may not be serious, but you may need more tests. Your doctor can help you decide what other tests you may need, if any.  What can I expect from the results?  About 1 out of 4 LDCT exams will find something that may need more tests. Most of the time, these findings are lung nodules. Lung nodules are very small collections of tissue in the lung. These nodules are very common, and the vast majority--more than 97 percent--are not cancer (benign). Most are normal lymph nodes or small areas of scarring from past infections.  But, if a small lung nodule is found to be cancer, the cancer can be cured more than 90 percent  of the time. To know if the nodule is cancer, we may need to get more images before your next yearly screening exam. If the nodule has suspicious features (for example, it is large, has an odd shape or grows over time), we will refer you to a specialist for further testing.  Will my doctor also get the results?  Yes. Your doctor will get a copy of your results.  Is it okay to keep smoking now that there s a cancer screening exam?  No. Tobacco is one of the strongest cancer-causing agents. It causes not only lung cancer, but other cancers and cardiovascular (heart) diseases as well. The damage caused by smoking builds over time. This means that the longer you smoke, the higher your risk of disease. While it is never too late to quit, the sooner you quit, the better.  Where can I find help to quit smoking?  The best way to prevent lung cancer is to stop smoking. If you have already quit smoking, congratulations and keep it up! For help on quitting smoking, please call [employer] at 0-809-923-SWNH ([phone]) or the American Cancer Society at 1-570.436.1430 to find local resources near you.  One-on-one health coaching:  If you d prefer to work individually with a health care provider on tobacco cessation, we offer:      Medication Therapy Management:  Our specially trained pharmacists work closely with you and your doctor to help you quit smoking.  Call [phone] or [phone] (toll free).     Can Do: Health coaching offered by [facility].  [email or web address]        Patient Education     What Is COPD?  COPD stands for chronic obstructive pulmonary disease. It means the airways in your lungs are blocked (obstructed). This makes it hard to breathe. You may have trouble with daily activities because of shortness of breath. Over time the shortness of breath often gets worse. This makes it harder to take care of yourself and take part in activities. Chronic bronchitis and emphysema are 2 common  types of COPD.  How did I get COPD?  Most people get COPD from smoking. Cigarette smoke damages lungs. This can develop into COPD over many years.  How COPD affects you  COPD makes you work harder to breathe. Air may get trapped in the lungs. This prevents your lungs from filling completely with fresh oxygen-filled air when you breathe in (inhale). It's harder to take deep breaths, especially when you are active and start breathing faster. Over time your lungs may become enlarged, filled with air that does not transfer oxygen into the blood. These problems lead to shortness of breath (also called dyspnea). Hoarse, whistling breathing (wheezing) and a chronic cough are common. So is feeling tired and worn out (fatigue).   What happens in chronic bronchitis?    The cells in the airways make more mucus than normal. The mucus builds up, narrowing the airways. This means less air travels into and out of the lungs. The lining of the airways may also become swollen (inflamed). This causes the airways to narrow even more.  What happens in emphysema?    The small airways are damaged and lose their stretchiness. The airways collapse when you exhale. This causes air to get trapped in the air sacs. This means that less oxygen enters the blood vessels. And less oxygen is delivered to all of the cells of your body. This makes it hard to breathe.  Damage to cilia    Cilia are small hairs that line and protect the airways. Smoking damages the cilia. Damaged cilia can t sweep mucus and particles away. Some of the cilia are destroyed. This damage makes COPD worse.  [employer] last reviewed this educational content on 8/1/2018 2000-2020 [employer]. All rights reserved. This information is not intended as a substitute for professional medical care. Always follow your healthcare professional's instructions.         F[employer] [facility] Inhaler http://www.fpanetwork.org/clinical-integration/health-resources/inhalers/index.htm
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
## 2021-03-17 NOTE — PROGRESS NOTES
Radha Patel's goals for this visit include: Consult  She requests these members of her care team be copied on today's visit information: PCP    PCP: [facility]    Referring Provider:  Chastity Bee MD  [id number] [place] [address]    There were no vitals taken for this visit.    Do you need any medication refills at today's visit? N    F[employer] Network Inhaler http://www.fpanetwork.org/clinical-integration/health-resources/inhalers/index.htm            Ramiro Syed LPN  Pulmonary Medicine:  [facility]  Phone: [phone] Fax: [phone]

## 2021-03-18 LAB
DLCOUNC-%PRED-PRE: 57 %
DLCOUNC-PRE: 11.37 ML/MIN/MMHG
DLCOUNC-PRED: 19.7 ML/MIN/MMHG
ERV-%PRED-PRE: 32 %
ERV-PRE: 0.26 L
ERV-PRED: 0.8 L
EXPTIME-PRE: 7.18 SEC
FEF2575-%PRED-PRE: 52 %
FEF2575-PRE: 0.89 L/SEC
FEF2575-PRED: 1.71 L/SEC
FEFMAX-%PRED-PRE: 70 %
FEFMAX-PRE: 3.73 L/SEC
FEFMAX-PRED: 5.31 L/SEC
FEV1-%PRED-PRE: 76 %
FEV1-PRE: 1.61 L
FEV1FEV6-PRE: 65 %
FEV1FEV6-PRED: 78 %
FEV1FVC-PRE: 64 %
FEV1FVC-PRED: 77 %
FEV1SVC-PRE: 63 %
FEV1SVC-PRED: 69 %
FIFMAX-PRE: 2.45 L/SEC
FRCPLETH-%PRED-PRE: 96 %
FRCPLETH-PRE: 2.67 L
FRCPLETH-PRED: 2.77 L
FVC-%PRED-PRE: 91 %
FVC-PRE: 2.51 L
FVC-PRED: 2.76 L
IC-%PRED-PRE: 101 %
IC-PRE: 2.3 L
IC-PRED: 2.26 L
RVPLETH-%PRED-PRE: 109 %
RVPLETH-PRE: 2.41 L
RVPLETH-PRED: 2.2 L
TLCPLETH-%PRED-PRE: 97 %
TLCPLETH-PRE: 4.97 L
TLCPLETH-PRED: 5.11 L
VA-%PRED-PRE: 92 %
VA-PRE: 4.47 L
VC-%PRED-PRE: 83 %
VC-PRE: 2.56 L
VC-PRED: 3.06 L

## 2021-03-19 NOTE — RESULT ENCOUNTER NOTE
Results discussed directly with patient while patient was present. Any further details documented in the note.   Ann-Marie Rubio MD

## 2021-03-22 ENCOUNTER — TELEPHONE (OUTPATIENT)
Dept: PULMONOLOGY | Facility: CLINIC | Age: 76
End: 2021-03-22

## 2021-03-22 ENCOUNTER — IMMUNIZATION (OUTPATIENT)
Dept: PEDIATRICS | Facility: CLINIC | Age: 76
End: 2021-03-22
Attending: INTERNAL MEDICINE
Payer: MEDICARE

## 2021-03-22 DIAGNOSIS — J43.2 CENTRILOBULAR EMPHYSEMA (H): Primary | ICD-10-CM

## 2021-03-22 PROCEDURE — 91300 PR COVID VAC PFIZER DIL RECON 30 MCG/0.3 ML IM: CPT

## 2021-03-22 PROCEDURE — 0002A PR COVID VAC PFIZER DIL RECON 30 MCG/0.3 ML IM: CPT

## 2021-03-22 NOTE — TELEPHONE ENCOUNTER
Prior Authorization Retail Medication Request    Medication/Dose: tiotropium (SPIRIVA RESPIMAT) 2.5 MCG/ACT inhaler    ICD code (if different than what is on RX):    Previously Tried and Failed:    Rationale:      Insurance Name:    Insurance ID:        Pharmacy Information (if different than what is on RX)  Name:    Phone:

## 2021-03-22 NOTE — TELEPHONE ENCOUNTER
PRIOR AUTHORIZATION DENIED    Medication: tiotropium (SPIRIVA RESPIMAT) 2.5 MCG/ACT inhaler-PA DENIED     Denial Date: 3/22/2021    Denial Rational: Criteria Not Met. Insurance states that patient must tried/failed 2 covered drugs: Anoro Ellipta Inhalation Aerosol powder breath activated 62.5-25 MCG Inhaler, Breo Ellipta Inhalation Aerosol Powder breath activated (100-25 MCG, 200-25 MCG), Incruse Ellipta Inhalation Aerosol Powder breath activated 62.5 MCG,.        Appeal Information:

## 2021-03-22 NOTE — TELEPHONE ENCOUNTER
Central Prior Authorization Team   [phone]    PA Initiation    Medication: tiotropium (SPIRIVA RESPIMAT) 2.5 MCG/ACT inhaler  Insurance Company: [employer] - Phone [phone] Fax [phone]  Pharmacy Filling the Rx: [id number] - [place], [place] - [address] [place]  Filling Pharmacy Phone: [phone]  Filling Pharmacy Fax: [phone]  Start Date: 3/22/2021

## 2021-03-30 ENCOUNTER — ANCILLARY PROCEDURE (OUTPATIENT)
Dept: MRI IMAGING | Facility: CLINIC | Age: 76
End: 2021-03-30
Attending: PSYCHIATRY & NEUROLOGY
Payer: MEDICARE

## 2021-03-30 DIAGNOSIS — R29.810 FACIAL WEAKNESS: ICD-10-CM

## 2021-03-30 DIAGNOSIS — Z86.73 HISTORY OF STROKE: ICD-10-CM

## 2021-03-30 DIAGNOSIS — Z85.3 HX: BREAST CANCER: ICD-10-CM

## 2021-03-30 LAB
CREAT BLD-MCNC: 0.8 MG/DL (ref 0.52–1.04)
GFR SERPL CREATININE-BSD FRML MDRD: 70 ML/MIN/{1.73_M2}

## 2021-03-30 PROCEDURE — A9585 GADOBUTROL INJECTION: HCPCS | Performed by: RADIOLOGY

## 2021-03-30 PROCEDURE — G1004 CDSM NDSC: HCPCS | Performed by: RADIOLOGY

## 2021-03-30 PROCEDURE — 70553 MRI BRAIN STEM W/O & W/DYE: CPT | Mod: MG | Performed by: RADIOLOGY

## 2021-03-30 RX ORDER — GADOBUTROL 604.72 MG/ML
7.5 INJECTION INTRAVENOUS ONCE
Status: COMPLETED | OUTPATIENT
Start: 2021-03-30 | End: 2021-03-30

## 2021-03-30 RX ADMIN — GADOBUTROL 6 ML: 604.72 INJECTION INTRAVENOUS at 08:34

## 2021-04-06 ENCOUNTER — TELEPHONE (OUTPATIENT)
Dept: ORTHOPEDICS | Facility: CLINIC | Age: 76
End: 2021-04-06

## 2021-04-06 ENCOUNTER — OFFICE VISIT (OUTPATIENT)
Dept: ORTHOPEDICS | Facility: CLINIC | Age: 76
End: 2021-04-06
Attending: PSYCHIATRY & NEUROLOGY
Payer: MEDICARE

## 2021-04-06 DIAGNOSIS — G56.02 CARPAL TUNNEL SYNDROME OF LEFT WRIST: ICD-10-CM

## 2021-04-06 DIAGNOSIS — G56.02 CARPAL TUNNEL SYNDROME OF LEFT WRIST: Primary | ICD-10-CM

## 2021-04-06 PROCEDURE — 99203 OFFICE O/P NEW LOW 30 MIN: CPT | Performed by: PLASTIC SURGERY

## 2021-04-06 ASSESSMENT — PAIN SCALES - GENERAL: PAINLEVEL: NO PAIN (0)

## 2021-04-06 NOTE — LETTER
4/6/2021         RE: Radha Patel  [address]  [facility] [address]        Dear Colleague,    Thank you for referring your patient, Radha Patel, to the [facility]. Please see a copy of my visit note below.    CONSULT NOTE      REFERRING PHYSICIAN:  Anam Omalley MD      PRESENTING COMPLAINT:  Consultation for numbness, tingling in the median innervated fingers of her left hand.      HISTORY OF PRESENTING COMPLAINT:  Ms. Patel is 75 years old.  She is right hand dominant.  For about a year or so, she has been noticing numbness and tingling in the median innervated fingers.  She is very clear that her little finger does not get affected.  She does not get night symptoms per se.  She has no neck pain, no radiculopathy, but it is getting constant and worse.  It is important to note that the patient has had an ischemic stroke a number of years ago which resulted in right sided weakness.  She has a fair well from it but has some residual weakness.      PAST MEDICAL HISTORY:  Stroke, diabetes and a history of breast cancer.      PAST SURGICAL HISTORY:  Lumpectomy, radiation, sentinel lymph node mapping and biopsy.      MEDICATIONS:  Amlodipine, Arimidex, aspirin, lisinopril, metoprolol.      ALLERGIES:  Hydrocodone--swelling.  Diphenhydramine and ibuprofen.      SOCIAL HISTORY:  Used to smoke about 2 packs a day for 30 years, quit in 2011.  Drinks alcohol.  She is a .  Is retired.      REVIEW OF SYSTEMS:  Denies chest pain, shortness of breath, MI, has had a CVA, no DVT or PE.      PHYSICAL EXAMINATION:  Vital signs are stable.  She is afebrile, in no obvious distress.  On examination of her left hand, she has no obvious atrophy either thenar, hypothenar or interosseal.  She subjectively feels numb in the median innervated fingers, but she is sharp in all 5 fingers.  She is sharp in the thenar eminence.  She definitely has Tinel in the wrist but not in the proximal  forearm or cubital tunnel.  Elbow flexion test is negative.  Phalen and carpal compression test are positive.  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      ASSESSMENT AND PLAN:  Based on above findings, a diagnosis of severe carpal tunnel syndrome but atypical was made.  I say atypical because the patient feels sensation in the median innervated fingers and this most likely is because of cross innervation to the ulnar nerve, and therefore, the brain senses these sensations, but actually, the median nerve when it is tested is not normal.  I discussed with the patient these findings.  I, therefore, do advocate surgery, not to help with the symptoms but to help prevent further degradation in nerve function, especially of thumb motion.  I, therefore, was very clear that I suspect if we do the surgery she is going to not have any improvement in her symptoms but will help prevent further muscle attenuation.  I explained to her how the surgery would done, showed her where the scars would be.  I was very clear about the risks including pain, infection, bleeding, scarring, asymmetry, seromas, hematomas, wound breakdown, wound dehiscence, injury to deeper structures, stiffness, CRPS, pillar pain, DVT, PE, MI, CVA, pneumonia and death.  She understood them all and wants to proceed.  We will schedule her as soon as possible.  All questions were answered.  She was happy with the visit.      Total time spent with chart review, visit itself and post-visit paperwork was 30 minutes.      cc:   Anam Omalley MD   [facility]   [address], [address]   [place]  [address]           Again, thank you for allowing me to participate in the care of your patient.        Sincerely,        MAYKEL Boateng MD

## 2021-04-06 NOTE — PROGRESS NOTES
CONSULT NOTE      REFERRING PHYSICIAN:  Anam Omalley MD      PRESENTING COMPLAINT:  Consultation for numbness, tingling in the median innervated fingers of her left hand.      HISTORY OF PRESENTING COMPLAINT:  Ms. Patel is 75 years old.  She is right hand dominant.  For about a year or so, she has been noticing numbness and tingling in the median innervated fingers.  She is very clear that her little finger does not get affected.  She does not get night symptoms per se.  She has no neck pain, no radiculopathy, but it is getting constant and worse.  It is important to note that the patient has had an ischemic stroke a number of years ago which resulted in right sided weakness.  She has a fair well from it but has some residual weakness.      PAST MEDICAL HISTORY:  Stroke, diabetes and a history of breast cancer.      PAST SURGICAL HISTORY:  Lumpectomy, radiation, sentinel lymph node mapping and biopsy.      MEDICATIONS:  Amlodipine, Arimidex, aspirin, lisinopril, metoprolol.      ALLERGIES:  Hydrocodone--swelling.  Diphenhydramine and ibuprofen.      SOCIAL HISTORY:  Used to smoke about 2 packs a day for 30 years, quit in 2011.  Drinks alcohol.  She is a .  Is retired.      REVIEW OF SYSTEMS:  Denies chest pain, shortness of breath, MI, has had a CVA, no DVT or PE.      PHYSICAL EXAMINATION:  Vital signs are stable.  She is afebrile, in no obvious distress.  On examination of her left hand, she has no obvious atrophy either thenar, hypothenar or interosseal.  She subjectively feels numb in the median innervated fingers, but she is sharp in all 5 fingers.  She is sharp in the thenar eminence.  She definitely has Tinel in the wrist but not in the proximal forearm or cubital tunnel.  Elbow flexion test is negative.  Phalen and carpal compression test are positive.  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      ASSESSMENT AND PLAN:  Based on above findings, a diagnosis of severe carpal tunnel syndrome but atypical was made.  I say atypical because the patient feels sensation in the median innervated fingers and this most likely is because of cross innervation to the ulnar nerve, and therefore, the brain senses these sensations, but actually, the median nerve when it is tested is not normal.  I discussed with the patient these findings.  I, therefore, do advocate surgery, not to help with the symptoms but to help prevent further degradation in nerve function, especially of thumb motion.  I, therefore, was very clear that I suspect if we do the surgery she is going to not have any improvement in her symptoms but will help prevent further muscle attenuation.  I explained to her how the surgery would done, showed her where the scars would be.  I was very clear about the risks including pain, infection, bleeding, scarring, asymmetry, seromas, hematomas, wound breakdown, wound dehiscence, injury to deeper structures, stiffness, CRPS, pillar pain, DVT, PE, MI, CVA, pneumonia and death.  She understood them all and wants to proceed.  We will schedule her as soon as possible.  All questions were answered.  She was happy with the visit.      Total time spent with chart review, visit itself and post-visit paperwork was 30 minutes.      cc:   Anam Omalley MD   [facility]   [address], [facility] [address]   [place]  [address]

## 2021-04-06 NOTE — LETTER
4/6/2021      RE: Radha Patel  [address]  [facility] [address]       Dear Colleague,    Thank you for the opportunity to participate in the care of your patient, Radha Patel, at the [facility]. Please see a copy of my visit note below.    CONSULT NOTE      REFERRING PHYSICIAN:  Anam Omalley MD      PRESENTING COMPLAINT:  Consultation for numbness, tingling in the median innervated fingers of her left hand.      HISTORY OF PRESENTING COMPLAINT:  Ms. Patel is 75 years old.  She is right hand dominant.  For about a year or so, she has been noticing numbness and tingling in the median innervated fingers.  She is very clear that her little finger does not get affected.  She does not get night symptoms per se.  She has no neck pain, no radiculopathy, but it is getting constant and worse.  It is important to note that the patient has had an ischemic stroke a number of years ago which resulted in right sided weakness.  She has a fair well from it but has some residual weakness.      PAST MEDICAL HISTORY:  Stroke, diabetes and a history of breast cancer.      PAST SURGICAL HISTORY:  Lumpectomy, radiation, sentinel lymph node mapping and biopsy.      MEDICATIONS:  Amlodipine, Arimidex, aspirin, lisinopril, metoprolol.      ALLERGIES:  Hydrocodone--swelling.  Diphenhydramine and ibuprofen.      SOCIAL HISTORY:  Used to smoke about 2 packs a day for 30 years, quit in 2011.  Drinks alcohol.  She is a .  Is retired.      REVIEW OF SYSTEMS:  Denies chest pain, shortness of breath, MI, has had a CVA, no DVT or PE.      PHYSICAL EXAMINATION:  Vital signs are stable.  She is afebrile, in no obvious distress.  On examination of her left hand, she has no obvious atrophy either thenar, hypothenar or interosseal.  She subjectively feels numb in the median innervated fingers, but she is sharp in all 5 fingers.  She is sharp in  the thenar eminence.  She definitely has Tinel in the wrist but not in the proximal forearm or cubital tunnel.  Elbow flexion test is negative.  Phalen and carpal compression test are positive.  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      ASSESSMENT AND PLAN:  Based on above findings, a diagnosis of severe carpal tunnel syndrome but atypical was made.  I say atypical because the patient feels sensation in the median innervated fingers and this most likely is because of cross innervation to the ulnar nerve, and therefore, the brain senses these sensations, but actually, the median nerve when it is tested is not normal.  I discussed with the patient these findings.  I, therefore, do advocate surgery, not to help with the symptoms but to help prevent further degradation in nerve function, especially of thumb motion.  I, therefore, was very clear that I suspect if we do the surgery she is going to not have any improvement in her symptoms but will help prevent further muscle attenuation.  I explained to her how the surgery would done, showed her where the scars would be.  I was very clear about the risks including pain, infection, bleeding, scarring, asymmetry, seromas, hematomas, wound breakdown, wound dehiscence, injury to deeper structures, stiffness, CRPS, pillar pain, DVT, PE, MI, CVA, pneumonia and death.  She understood them all and wants to proceed.  We will schedule her as soon as possible.  All questions were answered.  She was happy with the visit.      Total time spent with chart review, visit itself and post-visit paperwork was 30 minutes.       Please do not hesitate to contact me if you have any questions/concerns.     Sincerely,     MAYKEL Boateng MD     cc:   Anam Omalley MD   [facility]    [address]   [place]  [address]

## 2021-04-07 NOTE — TELEPHONE ENCOUNTER
Procedure: Left open carpal tunnel release  Facility: [facility]  Length: 20 minutes  Anesthesia: Local  Post-op appointments needed: 2.5 weeks provider only  Surgery packet/instructions given to patient?  to be mailed    Joshua Suarez RN

## 2021-04-08 PROBLEM — G56.02 CARPAL TUNNEL SYNDROME OF LEFT WRIST: Status: ACTIVE | Noted: 2021-04-08

## 2021-04-08 NOTE — TELEPHONE ENCOUNTER
Message left for the patient to call back to get surgery scheduled.  Sandar Lopez, Surgery Scheduling Coordinator

## 2021-04-08 NOTE — TELEPHONE ENCOUNTER
Date Scheduled: 4-30-21  Facility: [facility]  Surgeon: Dr. Boateng   Post-op appointment scheduled:   Next 5 appointments (look out 90 days)    Apr 26, 2021 12:40 PM  Pre-procedure Covid with Bk Curbside Covid Md Andrey 1  [facility] [address] [facility] [address]  [phone]   May 18, 2021  1:00 PM  Return Visit with MAYKEL Boateng MD  [facility] ([facility]) [address] [address] [place] [id number] [phone]           scheduled?: No  Surgery packet/instructions confirmed received?  No  Special Considerations:   Sandra Lopez, Surgery Scheduling Coordinator

## 2021-04-14 NOTE — PATIENT INSTRUCTIONS
[facility]  [id number] [place] [address]  Phone [phone]  Fax [phone]    SURGICAL INFORMATION & INSTRUCTIONS  Dr. Jen Boateng  Name of Surgery: Left open carpal tunnel release    Date of Surgery: 4/30/21    If you need to reschedule/schedule your surgery, please contact Sandra, our surgery scheduler at [facility], at [phone].    Arrival Time: 11:15 am    Time of Surgery:  12:15 pm    Please arrive early so that we can prepare you for surgery. If you arrive later than your scheduled arrival time, your surgery may be cancelled.  Please note that scheduled times may change, but you will always be notified if there is a change.      Location of Surgery:     [facility]  [address] [place] [address]  2nd floor check-in  Phone [phone]  Fax [phone]  [email or web address]    Prior to surgery    ? Medical Leave Forms  Please fax any medical leave forms from your employer/school to [phone] (if seen in [facility]). It can take up to 5 business days to complete the forms. We will fax them back to the number listed on the forms, if you would like a copy, please let us know and we will mail a copy to you. Do not bring with on day of surgery as the forms may get lost.    ? Call your insurance company  Ask if you need pre-approval for your surgery.  If pre-approval is needed, please call our surgery scheduler for assistance with the pre-approval process.   If you do not have insurance, please let us know.     ? Pre-Op Phone Call  You will receive a pre-op phone call 1-3 days before surgery to review your eating and drinking restrictions, review medications, and confirm surgery times.      ? 7-10 days BEFORE surgery  ? Stop taking aspirin, Plavix or aspirin products 10 days before surgery or as directed by your doctor.  ? Stop taking non-steroidal anti-inflammatory medications (naproxen/Aleve, ibuprofen/Advil/Motrin,  celecoxib/Celebrex, meloxicam/Mobic) 3 days before surgery or as directed by your surgeon.  This will reduce the risk of bleeding during surgery.  ? Stop taking fish oil (Omega-3-fatty acid) 1 week before surgery.  ? It is OK to take acetaminophen (Tylenol) up until 2 hours prior to surgery.  ? Take prescription medications as directed by your doctor. Discuss which medications to take or hold prior to your surgery, with your primary care doctor.  ? If you have diabetes, ask your primary care doctor or endocrinologist how you should take your medication(s).    ? COVID-19 Testing Prior to Surgery (see included handout)  o 3-4 days prior to surgery  o Call [phone] or [phone] to schedule    ? 24 hours BEFORE surgery  Stop drinking alcohol (beer, wine, liquor) at least 24-hours before and after your surgery.     ? Evening BEFORE surgery  - Take a shower - to help wash away bacteria (germs) from your skin.  It s normal to have bacteria on your skin and skin protects us from these germs.  When you have surgery, we cut the skin.  Sometimes germs get into the cuts and cause infection (illness caused by germs).  By following the showering instructions and using the special soap, you will lower the number of germs on your skin.  This decreases your chance of an infection.    - Buy or get 8 ounces of antiseptic surgical soap called 4% CHG.  Common brands of this soap are Hibiclens and Exidine.    - You can find it this soap at your local pharmacy, clinic or retail store.  If you have trouble finding it, ask your pharmacist to help you find the right substitute.  OK to use generic unscented soap if not able to purchase surgical soap.  - Do not shave within 12 inches of your incision (surgical cut) area for at least 3 days before surgery.  Shaving can make small cuts in the skin. This puts you at a higher risk of infection.    Items you will need for each shower:   - Newly washed towel  - 4 ounces of one of the  recommended soaps    Follow these instructions, the evening before surgery  - Wash your hair and body with your regular shampoo and soap. Make sure you rinse the shampoo and soap from your hair and body.  - Using clean hands, apply about 2 ounces of soap gently on your skin from the neck to your toes. Use on your groin area last. Do not use this soap on your face or head. If you get any soap in your eyes, ears or mouth, rinse right away.  - Once the soap has been on your skin for at least 1 minute, rinse off completely and repeat washing with the surgical soap one more time.  - Rinse well and dry off using a clean towel.  If you feel any tingling, itching or other irritation, rinse right away. It is normal to feel some coolness on the skin after using the antiseptic soap. Your skin may feel a bit dry after the shower, but do not use any lotions, creams or moisturizers. Do not use hair spray or other products in your hair.  - Dress in freshly washed clothes or pajamas. Use fresh pillowcases and sheets on your bed.    ? Day of Surgery  - Take another shower          Follow these instructions:      - Wash your hair and body with your regular shampoo and soap. Make sure you rinse the shampoo and soap from your hair and body.  - Using clean hands, apply about 2 ounces of soap gently on your skin from the neck to your toes. Use on your groin area last. Do not use this soap on your face or head. If you get any soap in your eyes, ears or mouth, rinse right away.  - Once the soap has been on your skin for at least 1 minute, rinse off completely and repeat washing with the surgical soap one more time.  - Rinse well and dry off using a clean towel.  If you feel any tingling, itching or other irritation, rinse right away. It is normal to feel some coolness on the skin after using the antiseptic soap. Your skin may feel a bit dry after the shower, but do not use any lotions, creams or moisturizers. Do not use hair spray or other  products in your hair.  - Dress in freshly washed clothes.  - Do not wear deodorant, cologne, lotion, makeup, nail polish or jewelry to surgery.    ? If there is any change in your health PRIOR to your surgery, please contact your surgeon's office.  Such as a fever, body aches, fatigue, any flu-like symptoms, rash, or any new injury to related body part.    ? Bring these items to the hospital/surgery center:   ? Insurance card(s)  ? Money for parking and co-pays, if needed  ? A list of all the medications you take, including vitamins, minerals, herbs and over the counter medications.    ? A copy of your Advance Health Care Directive, if you have one.  This tells us what treatment(s) you would or would not want, and who would make health care decisions, if you could no longer speak for yourself.    ? A case for glasses, contact lenses, hearing aids or dentures.   ? Your inhaler or CPAP machine, if you use these at home    ? Leave extra cash, jewelry and other valuables at home.       ? Other information:   Sleep Apnea: Let your nurse know if you have a history of sleep apnea, only if you are having surgery at the [facility].    When you arrive for surgery  When you get to the surgery center/hospital, you will:  - Check in. If you are under age 18, you must be with a parent or legal guardian.  - Sign consent forms, if you haven t already. These forms state that you know the risks and benefits of surgery. When you sign the forms, you give us permission to do the surgery. Do not sign them unless you understand what will happen during and after your surgery. If you have any questions about your surgery, ask to speak with your doctor before you sign the forms. If you don t understand the answers, ask again.  - Receive a copy of the Patient s Bill of Rights. If you do not receive a copy, please ask for one.  - Change into hospital clothes. Your belongings will be placed in a bag. We will return them to you  after surgery.  - Meet with the anesthesia provider. He or she will tell you what kind of anesthesia (medicine) will be used to keep you comfortable during surgery.  - Remember: it s okay to remind doctors and nurses to wash their hands before touching you.  - In most cases, your surgeon will use a marker to write his or her initials on the surgery site. This ensures that the exact site is operated on.  - For safety reasons, we will ask you the same questions many times. For example, we may ask your name and birth date over and over again.  - Friends and family can stay with you until it s time for surgery. While you re in surgery, they will be in the waiting area. Please note that cell phones are not allowed in some patient care areas.  - If you have questions about what will happen in the operating room, talk to your care team.  - You will meet with an anesthesiologist, before your surgery.  He or she may reference types of anesthesia commonly used for surgeries:   o General:  This involves the use of an IV for injection of medication and anesthesia. You are put into a sleep and have a breathing tube to assist you with breathing.  o Sedation:  You are asleep, but not so deply that you need a breathing tube.   o Local or Regional: a nerve is injected to numb the surgical area.  o Spinal: you are numbed from the waist down with medicine injected into your back.  o Femoral Nerve Block:  Anesthesia injected into the groin of leg which you are having surgery on.      After surgery  We will move you to a recovery room, where we will watch you closely. If you have any pain or discomfort, tell your nurse. He or she will try to make you comfortable.    - If you are staying overnight, we will move you to your hospital room after you are awake.  - If you are going home, we will move you to another room. Friends and family may be able to join you. The length of time you spend in recovery depend on the type of medicine you  received, your medical condition, the type of surgery you had, or your response to the anesthesia given during your procedure.  - When you are discharged from the recovery room, the nurses will review instructions with you and your caregiver.  - Please wash your hands every time you touch the wound or change bandages or dressings.  - Do not submerge the wound in water.  You may not use a bathtub or hot tub until the wound is closed. The wait time frame is generally 2-3 weeks, but any open area can be a source of incoming bacteria, so it is better to be on the safe side and avoid water submersion until your wound is fully healed.  Keep all dressings clean and dry.   - If you had surgery on your arm or leg, elevate it as much as possible to help reduce swelling.  - You may put ice on the surgical area for comfort, keeping ice on area for up to 20 minutes then off for 40 minutes.  You may do this the first few days after surgery to help reduce pain and swelling.   - Many surgical wounds will have small white strips of tape on them called steri-strips. These are to help support the incision and surrounding skin. Do not remove these. The edges will curl and fall off on their own, typically within 7-10 days with normal showering and hygiene.   - Drink at least 8-10 glasses of liquid each day to help your body heal.  - Keep your lungs clear by coughing and taking deep breaths every couple hours.  This is especially important the first 48 hours after surgery.    - Restrictions:  - Carpal Tunnel Release  o Limited use of left hand for 2-3 weeks after surgery.  o OK for range of motion of fingers, wrist, and elbow  o No lifting more than 5 pounds until your stitches have been removed and incision is healed (about 3 weeks post op). You will then progress to activity as tolerated, but try to avoid any heavy lifting or repetitive motions.     - Notify your doctor if you have any of the following:   o Fever of 101 F or  higher  o Numbness and/or tingling  o Increased pain, redness or swelling  o Drainage from wound  o Prolonged or uncontrolled bleeding  o Difficulty with movement    Follow-up Appointments, in Clinic  If you don't already have an appointment scheduled, please call to set up an appointment at [phone].      ? Post-Op appointments with provider (2.5 weeks post op)    Dealing with pain  A nurse will check your comfort level often during your stay. He or she will work with you to manage your pain.  It s expected that you will have pain after surgery.  Our goal is to reduce or minimize pain by:   - Medicine doesn t work the same for everyone. If your medicine isn t working, tell your nurse. There may be other medicines or treatments we can try.  - Medication Refills.  If you need refills on your pain medication, please call the clinic as soon as possible.  It may take 72-business hours to obtain a refill.  Refills must be picked up at check-in 2, [facility] or mailed to a pharmacy of your choice.    - It is expected that you will wean off the pain medications in a timely manner.   - Constipation is a common side effect of pain medication, decreased activity and anesthesia from surgery.  Take a stool softener as prescribed by your doctor at the time of discharge.  You may also use over the counter medications as needed.  Be sure to increase your fiber (fruits and vegetables) and your water intake.      Please call the clinic at [phone], if you experience any problems or have questions.  If you are having an emergency, always call 911 or seek immediate evaluation at the Emergency Room.    Thank you for selecting the [facility] for your care!  ---------------------------------------------

## 2021-04-19 DIAGNOSIS — Z11.59 ENCOUNTER FOR SCREENING FOR OTHER VIRAL DISEASES: ICD-10-CM

## 2021-04-26 DIAGNOSIS — Z11.59 ENCOUNTER FOR SCREENING FOR OTHER VIRAL DISEASES: ICD-10-CM

## 2021-04-26 LAB
SARS-COV-2 RNA RESP QL NAA+PROBE: NORMAL
SPECIMEN SOURCE: NORMAL

## 2021-04-26 PROCEDURE — U0005 INFEC AGEN DETEC AMPLI PROBE: HCPCS | Performed by: PLASTIC SURGERY

## 2021-04-26 PROCEDURE — U0003 INFECTIOUS AGENT DETECTION BY NUCLEIC ACID (DNA OR RNA); SEVERE ACUTE RESPIRATORY SYNDROME CORONAVIRUS 2 (SARS-COV-2) (CORONAVIRUS DISEASE [COVID-19]), AMPLIFIED PROBE TECHNIQUE, MAKING USE OF HIGH THROUGHPUT TECHNOLOGIES AS DESCRIBED BY CMS-2020-01-R: HCPCS | Performed by: PLASTIC SURGERY

## 2021-04-27 LAB
LABORATORY COMMENT REPORT: NORMAL
SARS-COV-2 RNA RESP QL NAA+PROBE: NEGATIVE
SPECIMEN SOURCE: NORMAL

## 2021-04-30 ENCOUNTER — HOSPITAL ENCOUNTER (OUTPATIENT)
Facility: AMBULATORY SURGERY CENTER | Age: 76
Discharge: HOME | End: 2021-04-30
Attending: PLASTIC SURGERY | Admitting: PLASTIC SURGERY
Payer: MEDICARE

## 2021-04-30 VITALS
DIASTOLIC BLOOD PRESSURE: 74 MMHG | SYSTOLIC BLOOD PRESSURE: 146 MMHG | TEMPERATURE: 98.5 F | RESPIRATION RATE: 20 BRPM | BODY MASS INDEX: 21.98 KG/M2 | WEIGHT: 132.06 LBS | OXYGEN SATURATION: 98 %

## 2021-04-30 DIAGNOSIS — G56.02 CARPAL TUNNEL SYNDROME OF LEFT WRIST: ICD-10-CM

## 2021-04-30 PROCEDURE — G8918 PT W/O PREOP ORDER IV AB PRO: HCPCS

## 2021-04-30 PROCEDURE — 64721 CARPAL TUNNEL SURGERY: CPT | Mod: LT

## 2021-04-30 PROCEDURE — G8907 PT DOC NO EVENTS ON DISCHARG: HCPCS

## 2021-04-30 NOTE — BRIEF OP NOTE
[facility]    Brief Operative Note    Pre-operative diagnosis: Carpal tunnel syndrome of left wrist [G56.02]  Post-operative diagnosis Same as pre-operative diagnosis    Procedure: Procedure(s):  RELEASE, CARPAL TUNNEL, Left  Surgeon: Surgeon(s) and Role:     * MAYKEL Boateng MD - Primary  Anesthesia: Local   Estimated blood loss: Minimal  Drains: None  Specimens: * No specimens in log *  Findings:   None.  Complications: None.  Implants: * No implants in log *

## 2021-04-30 NOTE — DISCHARGE INSTRUCTIONS
CARPAL AND ULNAR NERVE RELEASE POST-OPERATIVE INSTRUCTIONS    Instructions       Have someone drive you home after surgery and help you at home for 1-2      days.      Get plenty of rest.      Follow balanced diet.      Decreased activity may promote constipation, so you may want to add      more raw fruit to your diet, and be sure to increase fluid intake.      Take pain medication as prescribed. Do not take aspirin or any products      containing aspirin.      Do not drink alcohol when taking pain medications.      Even when not taking pain medications, no alcohol for 3 weeks as it      causes fluid retention.      If you are taking vitamins with iron, resume these as tolerated.      Do not smoke, as smoking delays healing and increases the risk of      complications.    Activities      Do not drive until you are no longer taking any pain medications      (narcotics).      Start walking as soon as possible, this helps to reduce swelling and       lowers the chance of blood clots.      Resume normal activities gradually.      Return to work in 1-2 days, depending upon extent of surgery      No strenuous work or stress on wound for 2-3 weeks.    Incision Care      Keep ACE wrap on for 3 days then discontinue. Keep dry until then with plastic bag. After 3 days may get wet. Every hour raise your hand above your head and pump your fist 10 times. Rewrap the ACE if it gets loose or too tight.       Avoid exposing scars to sun for at least 12 months.      Always use a strong sunblock, if sun exposure is unavoidable (SPF 50 or      greater).      Inspect daily for signs of infection.      No tub soaking, bathing, or swimming while sutures or drains are in place.      Keep area clean and dry for first 24 hours.     What to Expect      Some swelling and bruising.      May have slight bleeding from incision.  Apply 4 x 4 gauze with slight pressure to       control bleeding.      Skin grafts and flaps may take several weeks or  months to heal; a support garment or      bandage may be necessary for up to a year.    Appearance      Final results of surgery may take a year or more.    Follow-Up Care      If external sutures have been used, they will be removed in 5-14 days.    Please note my office will call you 1-2 business days after your procedure to check up on how you're doing and to schedule your post-operative appointment.        When to Call      If you have increased swelling or bruising.      If swelling and redness persist after a few days.      If you have increased redness along the incision.      If you have severe or increased pain not relieved by medication.      If you have any side effects to medications; such as, rash, nausea,      headache, vomiting.      If you have an oral temperature over 100.4 degrees.      If you have any yellowish or greenish drainage from the incisions or      notice a foul odor.      If you have bleeding from the incisions that is difficult to control with      light pressure.      If you have loss of feeling or motion.    For Medical Questions, Please Call:      [phone], Monday - Friday, 8 a.m. - 4:30 p.m.      After hours and on weekends, call Hospital Paging at [phone] and      ask for the Plastic Surgeon on call.    [facility]   Adult Discharge Orders & Instructions     For 24 hours after surgery    1. Get plenty of rest.  A responsible adult must stay with you for at least 24 hours after you leave the hospital.   2. Do not drive or use heavy equipment.  If you have weakness or tingling, don't drive or use heavy equipment until this feeling goes away.  3. Do not drink alcohol.  4. Avoid strenuous or risky activities.  Ask for help when climbing stairs.   5. You may feel lightheaded.  IF so, sit for a few minutes before standing.  Have someone help you get up.   6. If you have nausea (feel sick to your stomach): Drink only clear liquids such as apple juice, ginger ale,  broth or 7-Up.  Rest may also help.  Be sure to drink enough fluids.  Move to a regular diet as you feel able.  7. You may have a slight fever. Call the doctor if your fever is over 100 F (37.7 C) (taken under the tongue) or lasts longer than 24 hours.  8. You may have a dry mouth, a sore throat, muscle aches or trouble sleeping.  These should go away after 24 hours.  9. Do not make important or legal decisions.     Call your doctor for any of the followin.  Signs of infection (fever, growing tenderness at the surgery site, a large amount of drainage or bleeding, severe pain, foul-smelling drainage, redness, swelling).    2. It has been over 8 to 10 hours since surgery and you are still not able to urinate (pass water).    3.  Headache for over 24 hours.    4.  Numbness, tingling or weakness the day after surgery (if you had spinal anesthesia).                  5. Signs of Covid-19 infection (temperature over 100 degrees, shortness of breath, cough, loss of taste/smell, generalized body aches, persistent headache,                  chills, sore throat, nausea/vomiting/diarrhea).

## 2021-04-30 NOTE — OP NOTE
PREOPERATIVE DIAGNOSIS:  Left carpal tunnel syndrome.       POSTOPERATIVE DIAGNOSIS:  Left carpal tunnel syndrome.       PROCEDURES:  Left open carpal tunnel release.       SURGEON:  Jen Boateng MD.     RESIDENT: Junior Lopez MD      ANESTHESIA:  Local.       COMPLICATIONS:  Nil.       DRAINS:  Nil.       SPECIMENS:  Nil.       BLOOD LOSS:  1 mL.       DESCRIPTION OF PROCEDURE:   After informed consent was obtained from the patient, the proper site was discussed with him and appropriately marked in the procedure room.  She was placed in supine position with her knees comfortably flexed and pillows underneath them.  Preoperative antibiotics were given in the OR.  Her left hand was then surgically prepped and draped in a standard surgical fashion.I injected 1% lidocaine mixed 1:100,000 epinephrine 20 mL in the wrist area I n the pre-op area. An Esmarch was used to exsanguinate the hand and forearm and the tourniquet was elevated to 250 mmHg pressure.  I then made an incision over the proximal palm in line with the radial aspect of the ring finger.  I then bluntly dissected down to the palmar fascia, opened the palmar fascia and then identified the transverse carpal ligament.  I looked for any nerve-like structure passing superficial to the transcarpal ligament, which I did not see.  I then made an incision with a 15 blade through the transcarpal ligament, again in line with the ulnar aspect of the ring finger, in an effort to leave part of the transcarpal ligament over the underlying median nerve.  I then went ahead and distally cut the transcarpal ligament with a sharp pair of scissors until the deep fat of the hand was identified and the entire transcarpal ligament distally was released.  Then proximally under direct vision, I released the transcarpal ligament and the flexor retinaculum, again releasing the entire median nerve in the process.  Within the carpal tunnel,  I did not see any masses.  I then let the  tourniquet down.  Minimal bleeding was seen superficially.  No deep bleeding was seen.  It was controlled with bipolar cautery.  I then closed the incision with 3-0 nylon suture in an interrupted horizontal mattress fashion.  A sterile dressing and an ACE wrap were placed.  The patient tolerated the procedure well.  All counts were correct at the end of the case.  The patient was sent to recovery room in stable condition.

## 2021-05-18 ENCOUNTER — OFFICE VISIT (OUTPATIENT)
Dept: ORTHOPEDICS | Facility: CLINIC | Age: 76
End: 2021-05-18
Payer: MEDICARE

## 2021-05-18 ENCOUNTER — TELEPHONE (OUTPATIENT)
Dept: ORTHOPEDICS | Facility: CLINIC | Age: 76
End: 2021-05-18

## 2021-05-18 VITALS — BODY MASS INDEX: 21.99 KG/M2 | HEIGHT: 65 IN | WEIGHT: 132 LBS

## 2021-05-18 DIAGNOSIS — G56.02 CARPAL TUNNEL SYNDROME OF LEFT WRIST: Primary | ICD-10-CM

## 2021-05-18 DIAGNOSIS — G56.01 CARPAL TUNNEL SYNDROME OF RIGHT WRIST: Primary | ICD-10-CM

## 2021-05-18 PROCEDURE — 99024 POSTOP FOLLOW-UP VISIT: CPT | Performed by: PLASTIC SURGERY

## 2021-05-18 ASSESSMENT — MIFFLIN-ST. JEOR: SCORE: 1089.63

## 2021-05-18 ASSESSMENT — PAIN SCALES - GENERAL: PAINLEVEL: NO PAIN (0)

## 2021-05-18 NOTE — PROGRESS NOTES
PRESENTING COMPLAINT:  Postoperative visit status post left carpal tunnel release done 04/30/2021.    HISTORY OF PRESENTING COMPLAINT:  Ms. Patel is 75 years old.  She is a couple of weeks out from surgery and has done extremely well.  Her symptoms have significantly improved.  No issues.    PHYSICAL EXAMINATION:  Vital signs stable.  She is afebrile, in no obvious distress.  Everything is healing in well.    ASSESSMENT AND PLAN:  Based on above finding the diagnosis of left carpal tunnel release was made.  Took out the sutures.  We will now proceed with right carpal tunnel release in 6 weeks.

## 2021-05-18 NOTE — LETTER
5/18/2021         RE: Radha Patel  [address]  [place] [place] [address]        Dear Colleague,    Thank you for referring your patient, Radha Patel, to the [facility]. Please see a copy of my visit note below.    PRESENTING COMPLAINT:  Postoperative visit status post left carpal tunnel release done 04/30/2021.    HISTORY OF PRESENTING COMPLAINT:  Ms. Patel is 75 years old.  She is a couple of weeks out from surgery and has done extremely well.  Her symptoms have significantly improved.  No issues.    PHYSICAL EXAMINATION:  Vital signs stable.  She is afebrile, in no obvious distress.  Everything is healing in well.    ASSESSMENT AND PLAN:  Based on above finding the diagnosis of left carpal tunnel release was made.  Took out the sutures.  We will now proceed with right carpal tunnel release in 6 weeks.          Again, thank you for allowing me to participate in the care of your patient.        Sincerely,        MAYKEL Boateng MD

## 2021-05-18 NOTE — TELEPHONE ENCOUNTER
Message left for the patient to call back to get surgery scheduled.  Sandra Lopez, Surgery Scheduling Coordinator

## 2021-05-18 NOTE — PATIENT INSTRUCTIONS
[facility]  [address] [place] [address]  Phone [phone]  Fax [phone]    SURGICAL INFORMATION & INSTRUCTIONS  Dr. Jen Boateng  Name of Surgery: Right open carpal tunnel release    Date of Surgery:     If you need to reschedule/schedule your surgery, please contact Sandra, our surgery scheduler at [place], at [phone].    Arrival Time:     Time of Surgery:      Please arrive early so that we can prepare you for surgery. If you arrive later than your scheduled arrival time, your surgery may be cancelled.  Please note that scheduled times may change, but you will always be notified if there is a change.      Location of Surgery:     ? [facility]  [address] [address] [place] [address]  2nd floor check-in  Phone [phone]  Fax [phone]  [email or web address]    Prior to surgery    ? Medical Leave Forms  Please fax any medical leave forms from your employer/school to [phone] (if seen in [place]). It can take up to 5 business days to complete the forms. We will fax them back to the number listed on the forms, if you would like a copy, please let us know and we will mail a copy to you. Do not bring with on day of surgery as the forms may get lost.    ? Call your insurance company  Ask if you need pre-approval for your surgery.  If pre-approval is needed, please call our surgery scheduler for assistance with the pre-approval process.   If you do not have insurance, please let us know.     ? Pre-Op Phone Call  You will receive a pre-op phone call 1-3 days before surgery to review your eating and drinking restrictions, review medications, and confirm surgery times.      ? 7-10 days BEFORE surgery  ? Stop taking aspirin, Plavix or aspirin products 10 days before surgery or as directed by your doctor.  ? Stop taking non-steroidal anti-inflammatory medications (naproxen/Aleve, ibuprofen/Advil/Motrin, celecoxib/Celebrex,  meloxicam/Mobic) 3 days before surgery or as directed by your surgeon.  This will reduce the risk of bleeding during surgery.  ? Stop taking fish oil (Omega-3-fatty acid) 1 week before surgery.  ? It is OK to take acetaminophen (Tylenol) up until 2 hours prior to surgery.  ? Take prescription medications as directed by your doctor. Discuss which medications to take or hold prior to your surgery, with your primary care doctor.  ? If you have diabetes, ask your primary care doctor or endocrinologist how you should take your medication(s).    ? COVID-19 Testing Prior to Surgery (see included handout)  o 3-4 days prior to surgery  o Call [phone] or [phone] to schedule  o Please stay at home and out of contact with other people after your COVID test    ? 24 hours BEFORE surgery  Stop drinking alcohol (beer, wine, liquor) at least 24-hours before and after your surgery.     ? Evening BEFORE surgery  - Take a shower - to help wash away bacteria (germs) from your skin.  It s normal to have bacteria on your skin and skin protects us from these germs.  When you have surgery, we cut the skin.  Sometimes germs get into the cuts and cause infection (illness caused by germs).  By following the showering instructions and using the special soap, you will lower the number of germs on your skin.  This decreases your chance of an infection.    - Buy or get 8 ounces of antiseptic surgical soap called 4% CHG.  Common brands of this soap are Hibiclens and Exidine.    - You can find it this soap at your local pharmacy, clinic or retail store.  If you have trouble finding it, ask your pharmacist to help you find the right substitute.  OK to use generic unscented soap if not able to purchase surgical soap.  - Do not shave within 12 inches of your incision (surgical cut) area for at least 3 days before surgery.  Shaving can make small cuts in the skin. This puts you at a higher risk of infection.    Items you will need for each  shower:   - Newly washed towel  - 4 ounces of one of the recommended soaps    Follow these instructions, the evening before surgery  - Wash your hair and body with your regular shampoo and soap. Make sure you rinse the shampoo and soap from your hair and body.  - Using clean hands, apply about 2 ounces of soap gently on your skin from the neck to your toes. Use on your groin area last. Do not use this soap on your face or head. If you get any soap in your eyes, ears or mouth, rinse right away.  - Once the soap has been on your skin for at least 1 minute, rinse off completely and repeat washing with the surgical soap one more time.  - Rinse well and dry off using a clean towel.  If you feel any tingling, itching or other irritation, rinse right away. It is normal to feel some coolness on the skin after using the antiseptic soap. Your skin may feel a bit dry after the shower, but do not use any lotions, creams or moisturizers. Do not use hair spray or other products in your hair.  - Dress in freshly washed clothes or pajamas. Use fresh pillowcases and sheets on your bed.    ? Day of Surgery  - Take another shower          Follow these instructions:      - Wash your hair and body with your regular shampoo and soap. Make sure you rinse the shampoo and soap from your hair and body.  - Using clean hands, apply about 2 ounces of soap gently on your skin from the neck to your toes. Use on your groin area last. Do not use this soap on your face or head. If you get any soap in your eyes, ears or mouth, rinse right away.  - Once the soap has been on your skin for at least 1 minute, rinse off completely and repeat washing with the surgical soap one more time.  - Rinse well and dry off using a clean towel.  If you feel any tingling, itching or other irritation, rinse right away. It is normal to feel some coolness on the skin after using the antiseptic soap. Your skin may feel a bit dry after the shower, but do not use any  lotions, creams or moisturizers. Do not use hair spray or other products in your hair.  - Dress in freshly washed clothes.  - Do not wear deodorant, cologne, lotion, makeup, nail polish or jewelry to surgery.    ? If there is any change in your health PRIOR to your surgery, please contact your surgeon's office.  Such as a fever, body aches, fatigue, any flu-like symptoms, rash, or any new injury to related body part.    ? Bring these items to the hospital/surgery center:   ? Insurance card(s)  ? Money for parking and co-pays, if needed  ? A list of all the medications you take, including vitamins, minerals, herbs and over the counter medications.    ? A copy of your Advance Health Care Directive, if you have one.  This tells us what treatment(s) you would or would not want, and who would make health care decisions, if you could no longer speak for yourself.    ? A case for glasses, contact lenses, hearing aids or dentures.   ? Your inhaler or CPAP machine, if you use these at home    ? Leave extra cash, jewelry and other valuables at home.       ? Other information:   Sleep Apnea: Let your nurse know if you have a history of sleep apnea, only if you are having surgery at the [facility].    When you arrive for surgery  When you get to the surgery center/hospital, you will:  - Check in. If you are under age 18, you must be with a parent or legal guardian.  - Sign consent forms, if you haven t already. These forms state that you know the risks and benefits of surgery. When you sign the forms, you give us permission to do the surgery. Do not sign them unless you understand what will happen during and after your surgery. If you have any questions about your surgery, ask to speak with your doctor before you sign the forms. If you don t understand the answers, ask again.  - Receive a copy of the Patient s Bill of Rights. If you do not receive a copy, please ask for one.  - Change into hospital clothes. Your  belongings will be placed in a bag. We will return them to you after surgery.  - Meet with the anesthesia provider. He or she will tell you what kind of anesthesia (medicine) will be used to keep you comfortable during surgery.  - Remember: it s okay to remind doctors and nurses to wash their hands before touching you.  - In most cases, your surgeon will use a marker to write his or her initials on the surgery site. This ensures that the exact site is operated on.  - For safety reasons, we will ask you the same questions many times. For example, we may ask your name and birth date over and over again.  - Friends and family can stay with you until it s time for surgery. While you re in surgery, they will be in the waiting area. Please note that cell phones are not allowed in some patient care areas.  - If you have questions about what will happen in the operating room, talk to your care team.  - You will meet with an anesthesiologist, before your surgery.  He or she may reference types of anesthesia commonly used for surgeries:   o General:  This involves the use of an IV for injection of medication and anesthesia. You are put into a sleep and have a breathing tube to assist you with breathing.  o Sedation:  You are asleep, but not so deply that you need a breathing tube.   o Local or Regional: a nerve is injected to numb the surgical area.  o Spinal: you are numbed from the waist down with medicine injected into your back.  o Femoral Nerve Block:  Anesthesia injected into the groin of leg which you are having surgery on.      After surgery  We will move you to a recovery room, where we will watch you closely. If you have any pain or discomfort, tell your nurse. He or she will try to make you comfortable.    - If you are staying overnight, we will move you to your hospital room after you are awake.  - If you are going home, we will move you to another room. Friends and family may be able to join you. The length of  time you spend in recovery depend on the type of medicine you received, your medical condition, the type of surgery you had, or your response to the anesthesia given during your procedure.  - When you are discharged from the recovery room, the nurses will review instructions with you and your caregiver.  - Please wash your hands every time you touch the wound or change bandages or dressings.  - Do not submerge the wound in water.  You may not use a bathtub or hot tub until the wound is closed. The wait time frame is generally 2-3 weeks, but any open area can be a source of incoming bacteria, so it is better to be on the safe side and avoid water submersion until your wound is fully healed.  Keep all dressings clean and dry.   - If you had surgery on your arm or leg, elevate it as much as possible to help reduce swelling.  - You may put ice on the surgical area for comfort, keeping ice on area for up to 20 minutes then off for 40 minutes.  You may do this the first few days after surgery to help reduce pain and swelling.   - Many surgical wounds will have small white strips of tape on them called steri-strips. These are to help support the incision and surrounding skin. Do not remove these. The edges will curl and fall off on their own, typically within 7-10 days with normal showering and hygiene.   - Drink at least 8-10 glasses of liquid each day to help your body heal.  - Keep your lungs clear by coughing and taking deep breaths every couple hours.  This is especially important the first 48 hours after surgery.    - Restrictions:  - Carpal Tunnel Release  o Limited use of right hand for 2-3 weeks after surgery.  o OK for range of motion of fingers, wrist, and elbow  o No lifting more than 5 pounds until your stitches have been removed and incision is healed (about 3 weeks post op). You will then progress to activity as tolerated, but try to avoid any heavy lifting or repetitive motions.    - Notify your doctor if  you have any of the following:   o Fever of 101 F or higher  o Numbness and/or tingling  o Increased pain, redness or swelling  o Drainage from wound  o Prolonged or uncontrolled bleeding  o Difficulty with movement    Follow-up Appointments, in Clinic  If you don't already have an appointment scheduled, please call to set up an appointment at [phone].      ? Post-Op appointments with provider (2.5 weeks post op)    Dealing with pain  A nurse will check your comfort level often during your stay. He or she will work with you to manage your pain.  It s expected that you will have pain after surgery.  Our goal is to reduce or minimize pain by:   - Medicine doesn t work the same for everyone. If your medicine isn t working, tell your nurse. There may be other medicines or treatments we can try.  - Medication Refills.  If you need refills on your pain medication, please call the clinic as soon as possible.  It may take 72-business hours to obtain a refill.  Refills must be picked up at check-in 2, [facility] or mailed to a pharmacy of your choice.    - It is expected that you will wean off the pain medications in a timely manner.   - Constipation is a common side effect of pain medication, decreased activity and anesthesia from surgery.  Take a stool softener as prescribed by your doctor at the time of discharge.  You may also use over the counter medications as needed.  Be sure to increase your fiber (fruits and vegetables) and your water intake.      Please call the clinic at [phone], if you experience any problems or have questions.  If you are having an emergency, always call [phone] or seek immediate evaluation at the Emergency Room.    Thank you for selecting the [facility] for your care!  ---------------------------------------------

## 2021-06-10 DIAGNOSIS — J43.2 CENTRILOBULAR EMPHYSEMA (H): ICD-10-CM

## 2021-06-10 RX ORDER — ALBUTEROL SULFATE 90 UG/1
2 AEROSOL, METERED RESPIRATORY (INHALATION) EVERY 6 HOURS PRN
Qty: 8 G | Refills: 11 | Status: SHIPPED | OUTPATIENT
Start: 2021-06-10 | End: 2022-11-14

## 2021-06-10 NOTE — TELEPHONE ENCOUNTER
Writer received a refill request from: Ra[facility]l in [place].     Medication:     albuterol (PROAIR HFA/PROVENTIL HFA/VENTOLIN HFA) 108 (90 Base) MCG/ACT inhaler 1 g 0 3/17/2021  --   Sig - Route: Inhale 2 puffs into the lungs every 6 hours as needed for shortness of breath / dyspnea or wheezing - Inhalation     Date last written: 03/17/2021  Dispensed amount: 1 inhaler  Refills: 0  Date last dispensed: 03/17/2021      Pt's last office visit: 03/17/2021  Next scheduled office visit: 09/13/2021      Per the RN/LPN medication refill protocol, writer is  to refill this request.     (If able to refill, please call the pt to inform them the RX was sent to the pharmacy. If unable to refill, route this encounter to the prescribing physician for authorization or further instructions)

## 2021-06-18 DIAGNOSIS — Z11.59 ENCOUNTER FOR SCREENING FOR OTHER VIRAL DISEASES: ICD-10-CM

## 2021-06-18 PROBLEM — G56.01 CARPAL TUNNEL SYNDROME OF RIGHT WRIST: Status: ACTIVE | Noted: 2021-06-18

## 2021-06-18 NOTE — TELEPHONE ENCOUNTER
Date Scheduled: 8-13-21  Facility: [facility]  Surgeon: Dr. Boateng   Post-op appointment scheduled:   Next 5 appointments (look out 90 days)    Aug 09, 2021  1:30 PM  Pre-procedure Covid with MG COVID LAB  [facility] ([facility]) [address] [place] [id number]  [phone]   Aug 27, 2021  2:00 PM  Return Visit with Chastity Bee MD  [facility] ([facility]) [address] [place] [id number]  [phone]   Aug 31, 2021  1:00 PM  Return Visit with MAYKEL Boateng MD  [facility] ([facility]) [address] [place] [id number]  [phone]   Sep 13, 2021  2:00 PM  Return Visit with Ann-Marie Rubio MD  [facility] ([facility]) [address] [place] [id number] [phone]           scheduled?: No  Surgery packet/instructions confirmed received?  Yes  Special Considerations:   Sandra Lopez, Surgery Scheduling Coordinator

## 2021-08-05 ENCOUNTER — TELEPHONE (OUTPATIENT)
Dept: ONCOLOGY | Facility: CLINIC | Age: 76
End: 2021-08-05

## 2021-08-05 NOTE — TELEPHONE ENCOUNTER
JACQUELINEM to re-schedule appointment with Dr PENNY. Dr PENNY will be out of the office.     Genia

## 2021-08-09 ENCOUNTER — LAB (OUTPATIENT)
Dept: LAB | Facility: CLINIC | Age: 76
End: 2021-08-09
Payer: MEDICARE

## 2021-08-09 DIAGNOSIS — Z11.59 ENCOUNTER FOR SCREENING FOR OTHER VIRAL DISEASES: ICD-10-CM

## 2021-08-09 PROCEDURE — U0003 INFECTIOUS AGENT DETECTION BY NUCLEIC ACID (DNA OR RNA); SEVERE ACUTE RESPIRATORY SYNDROME CORONAVIRUS 2 (SARS-COV-2) (CORONAVIRUS DISEASE [COVID-19]), AMPLIFIED PROBE TECHNIQUE, MAKING USE OF HIGH THROUGHPUT TECHNOLOGIES AS DESCRIBED BY CMS-2020-01-R: HCPCS

## 2021-08-09 PROCEDURE — U0005 INFEC AGEN DETEC AMPLI PROBE: HCPCS

## 2021-08-10 LAB — SARS-COV-2 RNA RESP QL NAA+PROBE: NEGATIVE

## 2021-08-13 ENCOUNTER — HOSPITAL ENCOUNTER (OUTPATIENT)
Facility: AMBULATORY SURGERY CENTER | Age: 76
Discharge: HOME | End: 2021-08-13
Attending: PLASTIC SURGERY | Admitting: PLASTIC SURGERY
Payer: MEDICARE

## 2021-08-13 VITALS
DIASTOLIC BLOOD PRESSURE: 56 MMHG | TEMPERATURE: 97.8 F | SYSTOLIC BLOOD PRESSURE: 147 MMHG | RESPIRATION RATE: 16 BRPM | OXYGEN SATURATION: 93 %

## 2021-08-13 DIAGNOSIS — G56.01 CARPAL TUNNEL SYNDROME OF RIGHT WRIST: ICD-10-CM

## 2021-08-13 PROCEDURE — 64721 CARPAL TUNNEL SURGERY: CPT | Mod: RT | Performed by: PLASTIC SURGERY

## 2021-08-13 PROCEDURE — 64721 CARPAL TUNNEL SURGERY: CPT | Mod: RT

## 2021-08-13 PROCEDURE — G8918 PT W/O PREOP ORDER IV AB PRO: HCPCS

## 2021-08-13 PROCEDURE — G8907 PT DOC NO EVENTS ON DISCHARG: HCPCS

## 2021-08-13 NOTE — OP NOTE
PREOPERATIVE DIAGNOSIS:  Right carpal tunnel syndrome.       POSTOPERATIVE DIAGNOSIS:  Right carpal tunnel syndrome.       PROCEDURES:  Right open carpal tunnel release.       SURGEON:  Jen Boateng MD.       ANESTHESIA:  Local.       COMPLICATIONS:  Nil.       DRAINS:  Nil.       SPECIMENS:  Nil.       BLOOD LOSS:  1 mL.       DESCRIPTION OF PROCEDURE:   After informed consent was obtained from the patient, the proper site was discussed with him and appropriately marked in the procedure room.  She was placed in supine position with her knees comfortably flexed and pillows underneath them.  Preoperative antibiotics were given in the OR.  Her right hand was then surgically prepped and draped in a standard surgical fashion. A sterile glove was placed over the hand. The glove was then cut to expose the volar wrist. I injected 1% lidocaine mixed 1:100,000 epinephrine 20 mL in the wrist area in the holding room. An Esmarch was used to exsanguinate the hand and forearm and the tourniquet was elevated to 250 mmHg pressure.  I then made an incision over the proximal palm in line with the radial aspect of the ring finger.  I then bluntly dissected down to the palmar fascia, opened the palmar fascia and then identified the transverse carpal ligament.  I looked for any nerve-like structure passing superficial to the transcarpal ligament, which I did not see.  I then made an incision with a 15 blade through the transcarpal ligament, again in line with the ulnar aspect of the ring finger, in an effort to leave part of the transcarpal ligament over the underlying median nerve.  I then went ahead and distally cut the transcarpal ligament with a sharp pair of scissors until the deep fat of the hand was identified and the entire transcarpal ligament distally was released.  Then proximally under direct vision, I released the transcarpal ligament and the flexor retinaculum, again releasing the entire median nerve in the process.   Within the carpal tunnel,  I did not see any masses.  I then let the tourniquet down.  Minimal bleeding was seen superficially.  No deep bleeding was seen.  It was controlled with bipolar cautery.  I then closed the incision with 3-0 nylon suture in an interrupted horizontal mattress fashion.  A sterile dressing and an ACE wrap were placed.  The patient tolerated the procedure well.  All counts were correct at the end of the case.  The patient was sent to recovery room in stable condition.

## 2021-08-13 NOTE — DISCHARGE INSTRUCTIONS
CARPAL AND ULNAR NERVE RELEASE POST-OPERATIVE INSTRUCTIONS    Instructions       Have someone drive you home after surgery and help you at home for 1-2      days.      Get plenty of rest.      Follow balanced diet.      Decreased activity may promote constipation, so you may want to add      more raw fruit to your diet, and be sure to increase fluid intake.      Take pain medication as prescribed. Do not take aspirin or any products      containing aspirin.      Do not drink alcohol when taking pain medications.      Even when not taking pain medications, no alcohol for 3 weeks as it      causes fluid retention.      If you are taking vitamins with iron, resume these as tolerated.      Do not smoke, as smoking delays healing and increases the risk of      complications.    Activities      Do not drive until you are no longer taking any pain medications      (narcotics).      Start walking as soon as possible, this helps to reduce swelling and       lowers the chance of blood clots.      Resume normal activities gradually.      Return to work in 1-2 days, depending upon extent of surgery      No strenuous work or stress on wound for 2-3 weeks.    Incision Care      Keep ACE wrap on for 3 days then discontinue. Keep dry until then with plastic bag. After 3 days may get wet. Every hour raise your hand above your head and pump your fist 10 times. Rewrap the ACE if it gets loose or too tight.       Avoid exposing scars to sun for at least 12 months.      Always use a strong sunblock, if sun exposure is unavoidable (SPF 50 or      greater).      Inspect daily for signs of infection.      No tub soaking, bathing, or swimming while sutures or drains are in place.      Keep area clean and dry for first 24 hours.     What to Expect      Some swelling and bruising.      May have slight bleeding from incision.  Apply 4 x 4 gauze with slight pressure to       control bleeding.      Skin grafts and flaps may take several weeks or  months to heal; a support garment or      bandage may be necessary for up to a year.    Appearance      Final results of surgery may take a year or more.    Follow-Up Care      If external sutures have been used, they will be removed in 5-14 days.    Please note my office will call you 1-2 business days after your procedure to check up on how you're doing and to schedule your post-operative appointment.        When to Call      If you have increased swelling or bruising.      If swelling and redness persist after a few days.      If you have increased redness along the incision.      If you have severe or increased pain not relieved by medication.      If you have any side effects to medications; such as, rash, nausea,      headache, vomiting.      If you have an oral temperature over 100.4 degrees.      If you have any yellowish or greenish drainage from the incisions or      notice a foul odor.      If you have bleeding from the incisions that is difficult to control with      light pressure.      If you have loss of feeling or motion.    For Medical Questions, Please Call:      [phone], Monday - Friday, 8 a.m. - 4:30 p.m.      After hours and on weekends, call Hospital Paging at [phone] and      ask for the Plastic Surgeon on call.   lung cancer

## 2021-08-24 DIAGNOSIS — Z79.811 AROMATASE INHIBITOR USE: ICD-10-CM

## 2021-08-24 DIAGNOSIS — M81.8 OTHER OSTEOPOROSIS WITHOUT CURRENT PATHOLOGICAL FRACTURE: ICD-10-CM

## 2021-08-24 DIAGNOSIS — C50.912 INVASIVE DUCTAL CARCINOMA OF LEFT BREAST (H): ICD-10-CM

## 2021-08-24 RX ORDER — ANASTROZOLE 1 MG/1
1 TABLET ORAL DAILY
Qty: 90 TABLET | Refills: 1 | Status: SHIPPED | OUTPATIENT
Start: 2021-08-24 | End: 2022-01-12

## 2021-08-24 NOTE — TELEPHONE ENCOUNTER
SUBJECTIVE/OBJECTIVE:                                                    Refill request receive for:  anastrozole (ARIMIDEX) 1 MG tablet    Last refill per fax: 05/22/2021  Date of LOV r/t Medication: 02/26/2021 Dr. Bee   Future appt scheduled? Yes: 09/07/2021 Dr. Bee   Date faxed to clinic: 08/20/2021    RECENT LABS/VITALS                                                      No results for input(s): CHOL, HDL, LDL, TRIG, CHOLHDLRATIO in the last 28832 hours.  Lab Results   Component Value Date    AST 13 02/26/2021     Lab Results   Component Value Date    ALT 22 02/26/2021     No results found for: A1C  Creatinine   Date Value Ref Range Status   02/26/2021 0.95 0.52 - 1.04 mg/dL Final   ]  Potassium   Date Value Ref Range Status   02/26/2021 4.1 3.4 - 5.3 mmol/L Final   ]  No results found for: TSH  BP Readings from Last 4 Encounters:   08/13/21 (!) 147/56   04/30/21 (!) 146/74   03/17/21 136/68   03/10/21 138/72       PLAN:                                                      Sent to provider to advise.    Shawanda Srinivasan CMA

## 2021-08-27 ENCOUNTER — INFUSION THERAPY VISIT (OUTPATIENT)
Dept: INFUSION THERAPY | Facility: CLINIC | Age: 76
End: 2021-08-27
Payer: MEDICARE

## 2021-08-27 ENCOUNTER — LAB (OUTPATIENT)
Dept: LAB | Facility: CLINIC | Age: 76
End: 2021-08-27
Payer: MEDICARE

## 2021-08-27 ENCOUNTER — ANCILLARY PROCEDURE (OUTPATIENT)
Dept: BONE DENSITY | Facility: CLINIC | Age: 76
End: 2021-08-27
Attending: INTERNAL MEDICINE
Payer: MEDICARE

## 2021-08-27 VITALS
HEART RATE: 74 BPM | RESPIRATION RATE: 16 BRPM | WEIGHT: 134.4 LBS | OXYGEN SATURATION: 97 % | SYSTOLIC BLOOD PRESSURE: 149 MMHG | DIASTOLIC BLOOD PRESSURE: 69 MMHG | TEMPERATURE: 97.7 F | BODY MASS INDEX: 22.37 KG/M2

## 2021-08-27 DIAGNOSIS — C50.912 INVASIVE DUCTAL CARCINOMA OF LEFT BREAST (H): ICD-10-CM

## 2021-08-27 DIAGNOSIS — M81.8 OTHER OSTEOPOROSIS WITHOUT CURRENT PATHOLOGICAL FRACTURE: Primary | ICD-10-CM

## 2021-08-27 DIAGNOSIS — E28.39 ESTROGEN DEFICIENCY: ICD-10-CM

## 2021-08-27 DIAGNOSIS — M81.8 OTHER OSTEOPOROSIS WITHOUT CURRENT PATHOLOGICAL FRACTURE: ICD-10-CM

## 2021-08-27 DIAGNOSIS — E83.52 HYPERCALCEMIA: ICD-10-CM

## 2021-08-27 DIAGNOSIS — Z79.811 AROMATASE INHIBITOR USE: ICD-10-CM

## 2021-08-27 LAB
ALBUMIN SERPL-MCNC: 3.7 G/DL (ref 3.4–5)
ALP SERPL-CCNC: 59 U/L (ref 40–150)
ALT SERPL W P-5'-P-CCNC: 28 U/L (ref 0–50)
ANION GAP SERPL CALCULATED.3IONS-SCNC: 5 MMOL/L (ref 3–14)
AST SERPL W P-5'-P-CCNC: 15 U/L (ref 0–45)
BILIRUB DIRECT SERPL-MCNC: <0.1 MG/DL (ref 0–0.2)
BILIRUB SERPL-MCNC: 0.5 MG/DL (ref 0.2–1.3)
BUN SERPL-MCNC: 20 MG/DL (ref 7–30)
CALCIUM SERPL-MCNC: 9.3 MG/DL (ref 8.5–10.1)
CHLORIDE BLD-SCNC: 111 MMOL/L (ref 94–109)
CO2 SERPL-SCNC: 24 MMOL/L (ref 20–32)
CREAT SERPL-MCNC: 1.05 MG/DL (ref 0.52–1.04)
ERYTHROCYTE [DISTWIDTH] IN BLOOD BY AUTOMATED COUNT: 12.4 % (ref 10–15)
GFR SERPL CREATININE-BSD FRML MDRD: 52 ML/MIN/1.73M2
GLUCOSE BLD-MCNC: 102 MG/DL (ref 70–99)
HCT VFR BLD AUTO: 35 % (ref 35–47)
HGB BLD-MCNC: 12 G/DL (ref 11.7–15.7)
HOLD SPECIMEN: NORMAL
MCH RBC QN AUTO: 31.1 PG (ref 26.5–33)
MCHC RBC AUTO-ENTMCNC: 34.3 G/DL (ref 31.5–36.5)
MCV RBC AUTO: 91 FL (ref 78–100)
PLATELET # BLD AUTO: 219 10E3/UL (ref 150–450)
POTASSIUM BLD-SCNC: 3.9 MMOL/L (ref 3.4–5.3)
PROT SERPL-MCNC: 7.1 G/DL (ref 6.8–8.8)
RBC # BLD AUTO: 3.86 10E6/UL (ref 3.8–5.2)
SODIUM SERPL-SCNC: 140 MMOL/L (ref 133–144)
WBC # BLD AUTO: 6.5 10E3/UL (ref 4–11)

## 2021-08-27 PROCEDURE — 85027 COMPLETE CBC AUTOMATED: CPT

## 2021-08-27 PROCEDURE — 99207 PR NO CHARGE LOS: CPT

## 2021-08-27 PROCEDURE — 80053 COMPREHEN METABOLIC PANEL: CPT

## 2021-08-27 PROCEDURE — 36415 COLL VENOUS BLD VENIPUNCTURE: CPT

## 2021-08-27 PROCEDURE — 96372 THER/PROPH/DIAG INJ SC/IM: CPT | Performed by: NURSE PRACTITIONER

## 2021-08-27 PROCEDURE — 77080 DXA BONE DENSITY AXIAL: CPT | Performed by: RADIOLOGY

## 2021-08-27 PROCEDURE — 82248 BILIRUBIN DIRECT: CPT

## 2021-08-27 NOTE — PROGRESS NOTES
Infusion Nursing Note:  Radha Patel presents today for Prolia injection.    Patient seen by provider today: No   present during visit today: Not Applicable.    Note:   Patient states she only takes Vitamin D.  States a doctor advised her not to take Calcium.    Denies any dental issues at this time.    Reminded patient to drink extra water today to protect kidneys.    Intravenous Access:  No Intravenous access/labs at this visit.    Treatment Conditions:  Lab Results   Component Value Date     08/27/2021     02/26/2021                   Lab Results   Component Value Date    POTASSIUM 3.9 08/27/2021    POTASSIUM 4.1 02/26/2021           No results found for: MAG         Lab Results   Component Value Date    CR 1.05 08/27/2021    CR 0.95 02/26/2021                   Lab Results   Component Value Date    BERTIN 9.3 08/27/2021    BERTIN 10.4 02/26/2021                Lab Results   Component Value Date    BILITOTAL 0.5 08/27/2021    BILITOTAL 0.5 02/26/2021           Lab Results   Component Value Date    ALBUMIN 3.7 08/27/2021    ALBUMIN 3.9 02/26/2021                    Lab Results   Component Value Date    ALT 28 08/27/2021    ALT 22 02/26/2021           Lab Results   Component Value Date    AST 15 08/27/2021    AST 13 02/26/2021       Results reviewed, labs MET treatment parameters, ok to proceed with treatment.      Post Infusion Assessment:  Patient tolerated injection without incident.       Discharge Plan:   AVS to patient via MYCHART.  Patient will stop by 's desk to schedule next injection before leaving today.  Patient discharged in stable condition accompanied by: self. Uses [employer] for transportation.  Departure Mode: Ambulatory.      Claudia Thomas RN

## 2021-08-30 NOTE — PROGRESS NOTES
Radha PENNY Patel comes into clinic today at the request of Dr. Boateng for wound check and removal of sutures.    S: The patient is status post right open carpal tunnel release, sx: 8/13/21.   There has been no history of infection or drainage. Pain for only about 2 days, otherwise, symptoms have been improving every day. No other complaints with right hand. Pt does not some left hand swelling, but it is minimal. She is not having any pain or any other issues, just noticed swelling.     O/A: 4 sutures are seen located at the base of the right palm. Incision is well approximated, clean, dry, intact, and no signs of infection. Small amount of swelling. Minimal soreness.     P: All sutures were easily removed today. Routine wound care discussed. The patient will follow up as needed. If there are any concerns or signs and symptoms of infection, patient will reach out to the clinic. Patient is agreeable with plan.    This service provided today was under the direct supervision of Dr. Oh, who was available if needed.    Joshua Suarez RN

## 2021-08-30 NOTE — PATIENT INSTRUCTIONS
"Thanks for coming today.  [facility]  [address] [place] [address]    To schedule future appointments in Ortho Clinic, you may call [phone].    To schedule ordered imaging by your provider:   Call Central Imaging [phone]    To schedule an injection ordered by your provider:  Call Central Imaging Injection scheduling line: [phone]  [email or web address] available online at:  [email or web address]    Please call if any further questions or concerns ([phone]).  Clinic hours 8 am to 5 pm.    Return to clinic (call) if symptoms worsen or fail to improve.    Patient Education     Stitches or Staple Removal  You were seen today for removal of your stitches or staples. Your wound is healing as expected. The wound has healed well enough that the stitches or staples can be removed. The wound will continue to heal for the next few months.   At this time there is no sign of infection.   Home care    If you have pain, take pain medicine as advised by your healthcare provider.     Keep your wound clean and protected by covering it with a bandage for the next week or so.     Wash your hands with soap and warm water before and after caring for your wound. This helps prevent infection.    Clean the wound gently with soap and clean, running water daily or as directed by your healthcare provider. Don't use iodine, alcohol, or other cleansers on the wound.  Gently pat it dry. Put on a new bandage, if needed. Don't reuse bandages.    If the area gets wet, gently pat it dry with a clean cloth. Replace the wet bandage with a dry one.    Check the wound daily for signs of infection. (These are listed under \"When to seek medical advice\" below.)    You may shower and bathe as usual. Swimming may be allowed, but check with your healthcare provider first.    Keep the wound out of prolonged direct sunlight. The new skin is very sensitive and can easily sunburn causing worse scarring.    Ask " your provider about using over-the-counter scar removing creams if your wound is highly visible.    Follow-up care  Follow up with your healthcare provider as advised.  When to seek medical advice   Call your healthcare provider if any of the following occur:    Wound reopens or bleeds    Signs of an infection, such as:  ? Increasing redness or swelling around the wound  ? Increased warmth from the wound  ? Worsening pain  ? Red streaking lines away from the wound  ? Fluid draining from the wound    Fever of 100.4 F (38 C) or higher, or as directed by your healthcare provider  Arianna last reviewed this educational content on 4/1/2018 2000-2021 [employer]. All rights reserved. This information is not intended as a substitute for professional medical care. Always follow your healthcare professional's instructions.

## 2021-08-31 ENCOUNTER — ALLIED HEALTH/NURSE VISIT (OUTPATIENT)
Dept: NURSING | Facility: CLINIC | Age: 76
End: 2021-08-31
Payer: MEDICARE

## 2021-08-31 DIAGNOSIS — G56.01 CARPAL TUNNEL SYNDROME OF RIGHT WRIST: Primary | ICD-10-CM

## 2021-08-31 PROCEDURE — 99024 POSTOP FOLLOW-UP VISIT: CPT

## 2021-09-13 ENCOUNTER — OFFICE VISIT (OUTPATIENT)
Dept: PULMONOLOGY | Facility: CLINIC | Age: 76
End: 2021-09-13
Payer: MEDICARE

## 2021-09-13 ENCOUNTER — OFFICE VISIT (OUTPATIENT)
Dept: NURSING | Facility: CLINIC | Age: 76
End: 2021-09-13
Payer: MEDICARE

## 2021-09-13 VITALS
WEIGHT: 134 LBS | DIASTOLIC BLOOD PRESSURE: 64 MMHG | TEMPERATURE: 97.2 F | SYSTOLIC BLOOD PRESSURE: 126 MMHG | HEART RATE: 59 BPM | OXYGEN SATURATION: 97 % | BODY MASS INDEX: 22.3 KG/M2 | RESPIRATION RATE: 12 BRPM

## 2021-09-13 VITALS — WEIGHT: 134.8 LBS | BODY MASS INDEX: 22.43 KG/M2 | HEART RATE: 75 BPM | OXYGEN SATURATION: 97 %

## 2021-09-13 DIAGNOSIS — J43.2 CENTRILOBULAR EMPHYSEMA (H): ICD-10-CM

## 2021-09-13 PROCEDURE — 94729 DIFFUSING CAPACITY: CPT | Performed by: INTERNAL MEDICINE

## 2021-09-13 PROCEDURE — 99214 OFFICE O/P EST MOD 30 MIN: CPT | Mod: 25 | Performed by: INTERNAL MEDICINE

## 2021-09-13 PROCEDURE — 94726 PLETHYSMOGRAPHY LUNG VOLUMES: CPT | Performed by: INTERNAL MEDICINE

## 2021-09-13 PROCEDURE — 94060 EVALUATION OF WHEEZING: CPT | Performed by: INTERNAL MEDICINE

## 2021-09-13 ASSESSMENT — PAIN SCALES - GENERAL: PAINLEVEL: NO PAIN (0)
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
## 2021-09-13 NOTE — PROGRESS NOTES
Radha Patel's goals for this visit include: Return  She requests these members of her care team be copied on today's visit information: PCP    PCP: Odalys, [facility]    Referring Provider:  No referring provider defined for this encounter.    /64   Pulse 59   Temp 97.2  F (36.2  C)   Resp 12   Wt 60.8 kg (134 lb)   SpO2 97%   BMI 22.30 kg/m      Do you need any medication refills at today's visit? Y      Ramiro Syed LPN  Pulmonary Medicine:  [facility]  Phone: [phone] Fax: [phone]

## 2021-09-13 NOTE — PATIENT INSTRUCTIONS
Patient Education     What Is COPD?  COPD stands for chronic obstructive pulmonary disease. It means the airways in your lungs are blocked (obstructed). This makes it hard to breathe. You may have trouble with daily activities because of shortness of breath. Over time the shortness of breath often gets worse. This makes it harder to take care of yourself and take part in activities. Chronic bronchitis and emphysema are 2 common types of COPD.  How did I get COPD?  Most people get COPD from smoking. Cigarette smoke damages lungs. This can develop into COPD over many years.  How COPD affects you  COPD makes you work harder to breathe. Air may get trapped in the lungs. This prevents your lungs from filling completely with fresh oxygen-filled air when you breathe in (inhale). It's harder to take deep breaths, especially when you are active and start breathing faster. Over time your lungs may become enlarged, filled with air that does not transfer oxygen into the blood. These problems lead to shortness of breath (also called dyspnea). Hoarse, whistling breathing (wheezing) and a chronic cough are common. So is feeling tired and worn out (fatigue).   What happens in chronic bronchitis?    The cells in the airways make more mucus than normal. The mucus builds up, narrowing the airways. This means less air travels into and out of the lungs. The lining of the airways may also become swollen (inflamed). This causes the airways to narrow even more.  What happens in emphysema?    The small airways are damaged and lose their stretchiness. The airways collapse when you exhale. This causes air to get trapped in the air sacs. This means that less oxygen enters the blood vessels. And less oxygen is delivered to all of the cells of your body. This makes it hard to breathe.  Damage to cilia    Cilia are small hairs that line and protect the airways. Smoking damages the cilia. Damaged cilia can t sweep mucus and particles away. Some  of the cilia are destroyed. This damage makes COPD worse.  [employer] last reviewed this educational content on 8/1/2018 2000-2021 [employer]. All rights reserved. This information is not intended as a substitute for professional medical care. Always follow your healthcare professional's instructions.

## 2021-09-14 LAB
DLCOUNC-%PRED-PRE: 58 %
DLCOUNC-PRE: 11.53 ML/MIN/MMHG
DLCOUNC-PRED: 19.7 ML/MIN/MMHG
ERV-%PRED-PRE: 59 %
ERV-PRE: 0.47 L
ERV-PRED: 0.79 L
EXPTIME-PRE: 5.91 SEC
FEF2575-%PRED-POST: 67 %
FEF2575-%PRED-PRE: 63 %
FEF2575-POST: 1.16 L/SEC
FEF2575-PRE: 1.09 L/SEC
FEF2575-PRED: 1.71 L/SEC
FEFMAX-%PRED-PRE: 82 %
FEFMAX-PRE: 4.4 L/SEC
FEFMAX-PRED: 5.31 L/SEC
FEV1-%PRED-PRE: 77 %
FEV1-PRE: 1.63 L
FEV1FEV6-PRE: 72 %
FEV1FEV6-PRED: 78 %
FEV1FVC-PRE: 71 %
FEV1FVC-PRED: 77 %
FEV1SVC-PRE: 64 %
FEV1SVC-PRED: 69 %
FIFMAX-PRE: 2.14 L/SEC
FRCPLETH-%PRED-PRE: 90 %
FRCPLETH-PRE: 2.51 L
FRCPLETH-PRED: 2.77 L
FVC-%PRED-PRE: 83 %
FVC-PRE: 2.31 L
FVC-PRED: 2.76 L
IC-%PRED-PRE: 91 %
IC-PRE: 2.07 L
IC-PRED: 2.27 L
RVPLETH-%PRED-PRE: 92 %
RVPLETH-PRE: 2.04 L
RVPLETH-PRED: 2.2 L
TLCPLETH-%PRED-PRE: 89 %
TLCPLETH-PRE: 4.58 L
TLCPLETH-PRED: 5.11 L
VA-%PRED-PRE: 77 %
VA-PRE: 3.76 L
VC-%PRED-PRE: 82 %
VC-PRE: 2.54 L
VC-PRED: 3.06 L

## 2021-10-02 ENCOUNTER — HEALTH MAINTENANCE LETTER (OUTPATIENT)
Age: 76
End: 2021-10-02

## 2021-12-29 ENCOUNTER — TELEPHONE (OUTPATIENT)
Dept: ONCOLOGY | Facility: CLINIC | Age: 76
End: 2021-12-29
Payer: MEDICARE

## 2021-12-29 NOTE — TELEPHONE ENCOUNTER
SHILPA to have patient call back so we can get her scheduled with CLAUDIA or Dr PENNY for a follow up.    Genia

## 2022-01-12 ENCOUNTER — ONCOLOGY VISIT (OUTPATIENT)
Dept: ONCOLOGY | Facility: CLINIC | Age: 77
End: 2022-01-12
Payer: MEDICARE

## 2022-01-12 VITALS
DIASTOLIC BLOOD PRESSURE: 58 MMHG | SYSTOLIC BLOOD PRESSURE: 140 MMHG | BODY MASS INDEX: 21.05 KG/M2 | TEMPERATURE: 98.3 F | HEART RATE: 62 BPM | RESPIRATION RATE: 16 BRPM | OXYGEN SATURATION: 96 % | WEIGHT: 131 LBS | HEIGHT: 66 IN

## 2022-01-12 DIAGNOSIS — M81.8 OTHER OSTEOPOROSIS WITHOUT CURRENT PATHOLOGICAL FRACTURE: ICD-10-CM

## 2022-01-12 DIAGNOSIS — E28.39 ESTROGEN DEFICIENCY: Primary | ICD-10-CM

## 2022-01-12 DIAGNOSIS — Z12.31 VISIT FOR SCREENING MAMMOGRAM: ICD-10-CM

## 2022-01-12 DIAGNOSIS — C50.912 INVASIVE DUCTAL CARCINOMA OF LEFT BREAST (H): ICD-10-CM

## 2022-01-12 DIAGNOSIS — Z79.811 AROMATASE INHIBITOR USE: ICD-10-CM

## 2022-01-12 DIAGNOSIS — R25.2 LEG CRAMPS: ICD-10-CM

## 2022-01-12 PROCEDURE — 99214 OFFICE O/P EST MOD 30 MIN: CPT | Performed by: NURSE PRACTITIONER

## 2022-01-12 RX ORDER — ANASTROZOLE 1 MG/1
1 TABLET ORAL DAILY
Qty: 90 TABLET | Refills: 1 | Status: SHIPPED | OUTPATIENT
Start: 2022-01-12 | End: 2022-07-13

## 2022-01-12 ASSESSMENT — PAIN SCALES - GENERAL: PAINLEVEL: NO PAIN (0)

## 2022-01-12 ASSESSMENT — MIFFLIN-ST. JEOR: SCORE: 1088.02
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
## 2022-01-12 NOTE — NURSING NOTE
"Oncology Rooming Note    January 12, 2022 2:57 PM   Radha Patel is a 76 year old female who presents for:    Chief Complaint   Patient presents with     Oncology Clinic Visit     Follow up     Initial Vitals: BP (!) 143/75 (BP Location: Right arm)   Pulse 62   Temp 98.3  F (36.8  C) (Oral)   Resp 16   Ht 1.664 m (5' 5.5\")   Wt 59.4 kg (131 lb)   SpO2 96%   BMI 21.47 kg/m   Estimated body mass index is 21.47 kg/m  as calculated from the following:    Height as of this encounter: 1.664 m (5' 5.5\").    Weight as of this encounter: 59.4 kg (131 lb). Body surface area is 1.66 meters squared.  No Pain (0) Comment: Data Unavailable   No LMP recorded. Patient has had a hysterectomy.  Allergies reviewed: Yes  Medications reviewed: Yes    Medications: Medication refills not needed today.  Pharmacy name entered into [facility]: [id number] - [place], [place] - [phone] [place]    Clinical concerns: started about 3-4 months ago- muscle cramps in legs almost every evening in recent weeks- will awake from sleep.        Lucille Nguyen LPN            "
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
## 2022-02-22 RX ORDER — EPINEPHRINE 1 MG/ML
0.3 INJECTION, SOLUTION INTRAMUSCULAR; SUBCUTANEOUS EVERY 5 MIN PRN
Status: CANCELLED | OUTPATIENT
Start: 2022-02-25

## 2022-02-22 RX ORDER — ALBUTEROL SULFATE 90 UG/1
1-2 AEROSOL, METERED RESPIRATORY (INHALATION)
Status: CANCELLED
Start: 2022-02-25

## 2022-02-22 RX ORDER — MEPERIDINE HYDROCHLORIDE 25 MG/ML
25 INJECTION INTRAMUSCULAR; INTRAVENOUS; SUBCUTANEOUS EVERY 30 MIN PRN
Status: CANCELLED | OUTPATIENT
Start: 2022-02-25

## 2022-02-22 RX ORDER — METHYLPREDNISOLONE SODIUM SUCCINATE 125 MG/2ML
125 INJECTION, POWDER, LYOPHILIZED, FOR SOLUTION INTRAMUSCULAR; INTRAVENOUS
Status: CANCELLED
Start: 2022-02-25

## 2022-02-22 RX ORDER — ALBUTEROL SULFATE 0.83 MG/ML
2.5 SOLUTION RESPIRATORY (INHALATION)
Status: CANCELLED | OUTPATIENT
Start: 2022-02-25

## 2022-02-22 RX ORDER — DIPHENHYDRAMINE HYDROCHLORIDE 50 MG/ML
50 INJECTION INTRAMUSCULAR; INTRAVENOUS
Status: CANCELLED
Start: 2022-02-25

## 2022-02-22 RX ORDER — SODIUM CHLORIDE 9 MG/ML
1000 INJECTION, SOLUTION INTRAVENOUS CONTINUOUS PRN
Status: CANCELLED
Start: 2022-02-25

## 2022-02-22 RX ORDER — EPINEPHRINE 0.3 MG/.3ML
0.3 INJECTION SUBCUTANEOUS EVERY 5 MIN PRN
Status: CANCELLED | OUTPATIENT
Start: 2022-02-25

## 2022-02-25 ENCOUNTER — LAB (OUTPATIENT)
Dept: LAB | Facility: CLINIC | Age: 77
End: 2022-02-25
Payer: MEDICARE

## 2022-02-25 ENCOUNTER — INFUSION THERAPY VISIT (OUTPATIENT)
Dept: INFUSION THERAPY | Facility: CLINIC | Age: 77
End: 2022-02-25
Payer: MEDICARE

## 2022-02-25 VITALS
TEMPERATURE: 97.7 F | SYSTOLIC BLOOD PRESSURE: 145 MMHG | OXYGEN SATURATION: 97 % | RESPIRATION RATE: 18 BRPM | HEART RATE: 70 BPM | DIASTOLIC BLOOD PRESSURE: 67 MMHG

## 2022-02-25 DIAGNOSIS — Z79.811 AROMATASE INHIBITOR USE: ICD-10-CM

## 2022-02-25 DIAGNOSIS — C50.912 INVASIVE DUCTAL CARCINOMA OF LEFT BREAST (H): ICD-10-CM

## 2022-02-25 DIAGNOSIS — M81.8 OTHER OSTEOPOROSIS WITHOUT CURRENT PATHOLOGICAL FRACTURE: Primary | ICD-10-CM

## 2022-02-25 LAB
CALCIUM SERPL-MCNC: 10.1 MG/DL (ref 8.5–10.1)
CREAT SERPL-MCNC: 0.93 MG/DL (ref 0.52–1.04)
GFR SERPL CREATININE-BSD FRML MDRD: 63 ML/MIN/1.73M2

## 2022-02-25 PROCEDURE — 36415 COLL VENOUS BLD VENIPUNCTURE: CPT | Performed by: NURSE PRACTITIONER

## 2022-02-25 PROCEDURE — 96372 THER/PROPH/DIAG INJ SC/IM: CPT | Performed by: INTERNAL MEDICINE

## 2022-02-25 PROCEDURE — 99207 PR NO CHARGE LOS: CPT

## 2022-02-25 PROCEDURE — 82310 ASSAY OF CALCIUM: CPT | Performed by: NURSE PRACTITIONER

## 2022-02-25 PROCEDURE — 82565 ASSAY OF CREATININE: CPT | Performed by: NURSE PRACTITIONER

## 2022-02-25 ASSESSMENT — PAIN SCALES - GENERAL: PAINLEVEL: NO PAIN (0)

## 2022-02-25 NOTE — PROGRESS NOTES
Infusion Nursing Note:  Radha Patel presents today for Prolia    Patient seen by provider today: No   present during visit today: Not Applicable.    Note: Assessment performed by Jina JORGENSEN RN prior to injection today. Patient denies symptoms/concerns following previous injection.    Intravenous Access:  No Intravenous access/labs at this visit.    Treatment Conditions:  Patient continues to take Calcium and Vitamin D daily as directed. Denies jaw pain, nor any upcoming dental procedures.    Lab Results   Component Value Date     08/27/2021    POTASSIUM 3.9 08/27/2021    CR 0.93 02/25/2022    BERTIN 10.1 02/25/2022    BILITOTAL 0.5 08/27/2021    ALBUMIN 3.7 08/27/2021    ALT 28 08/27/2021    AST 15 08/27/2021     Results reviewed, labs MET treatment parameters, ok to proceed with treatment.      Post Infusion Assessment:  Patient tolerated injection without incident.  Site patent and intact, free from redness, edema or discomfort.       Discharge Plan:   Patient discharged in stable condition accompanied by: self.  Departure Mode: Ambulatory.      Lucille Nguyen LPN

## 2022-03-19 ENCOUNTER — HEALTH MAINTENANCE LETTER (OUTPATIENT)
Age: 77
End: 2022-03-19

## 2022-04-25 ENCOUNTER — TELEPHONE (OUTPATIENT)
Dept: PULMONOLOGY | Facility: CLINIC | Age: 77
End: 2022-04-25
Payer: MEDICARE

## 2022-04-27 ENCOUNTER — ANCILLARY PROCEDURE (OUTPATIENT)
Dept: MAMMOGRAPHY | Facility: CLINIC | Age: 77
End: 2022-04-27
Attending: NURSE PRACTITIONER
Payer: MEDICARE

## 2022-04-27 DIAGNOSIS — Z12.31 VISIT FOR SCREENING MAMMOGRAM: ICD-10-CM

## 2022-04-27 PROCEDURE — 77063 BREAST TOMOSYNTHESIS BI: CPT | Mod: GC

## 2022-04-27 PROCEDURE — 77067 SCR MAMMO BI INCL CAD: CPT | Mod: GC

## 2022-05-14 ENCOUNTER — HEALTH MAINTENANCE LETTER (OUTPATIENT)
Age: 77
End: 2022-05-14

## 2022-07-13 ENCOUNTER — LAB (OUTPATIENT)
Dept: LAB | Facility: CLINIC | Age: 77
End: 2022-07-13
Attending: NURSE PRACTITIONER
Payer: MEDICARE

## 2022-07-13 ENCOUNTER — ONCOLOGY VISIT (OUTPATIENT)
Dept: ONCOLOGY | Facility: CLINIC | Age: 77
End: 2022-07-13
Attending: NURSE PRACTITIONER
Payer: MEDICARE

## 2022-07-13 VITALS
RESPIRATION RATE: 18 BRPM | OXYGEN SATURATION: 96 % | TEMPERATURE: 98 F | DIASTOLIC BLOOD PRESSURE: 83 MMHG | HEART RATE: 71 BPM | WEIGHT: 133.9 LBS | BODY MASS INDEX: 21.52 KG/M2 | HEIGHT: 66 IN | SYSTOLIC BLOOD PRESSURE: 153 MMHG

## 2022-07-13 DIAGNOSIS — M25.472 ANKLE EDEMA, BILATERAL: Primary | ICD-10-CM

## 2022-07-13 DIAGNOSIS — C50.912 INVASIVE DUCTAL CARCINOMA OF LEFT BREAST (H): ICD-10-CM

## 2022-07-13 DIAGNOSIS — Z79.811 AROMATASE INHIBITOR USE: ICD-10-CM

## 2022-07-13 DIAGNOSIS — R25.2 LEG CRAMPS: ICD-10-CM

## 2022-07-13 DIAGNOSIS — M81.8 OTHER OSTEOPOROSIS WITHOUT CURRENT PATHOLOGICAL FRACTURE: ICD-10-CM

## 2022-07-13 DIAGNOSIS — M25.471 ANKLE EDEMA, BILATERAL: Primary | ICD-10-CM

## 2022-07-13 DIAGNOSIS — E28.39 ESTROGEN DEFICIENCY: ICD-10-CM

## 2022-07-13 DIAGNOSIS — Z12.31 VISIT FOR SCREENING MAMMOGRAM: ICD-10-CM

## 2022-07-13 LAB
ALBUMIN SERPL-MCNC: 3.9 G/DL (ref 3.4–5)
ALP SERPL-CCNC: 52 U/L (ref 40–150)
ALT SERPL W P-5'-P-CCNC: 20 U/L (ref 0–50)
ANION GAP SERPL CALCULATED.3IONS-SCNC: 3 MMOL/L (ref 3–14)
AST SERPL W P-5'-P-CCNC: 11 U/L (ref 0–45)
BASOPHILS # BLD AUTO: 0 10E3/UL (ref 0–0.2)
BASOPHILS NFR BLD AUTO: 0 %
BILIRUB DIRECT SERPL-MCNC: 0.1 MG/DL (ref 0–0.2)
BILIRUB SERPL-MCNC: 0.4 MG/DL (ref 0.2–1.3)
BUN SERPL-MCNC: 16 MG/DL (ref 7–30)
CALCIUM SERPL-MCNC: 9.8 MG/DL (ref 8.5–10.1)
CHLORIDE BLD-SCNC: 113 MMOL/L (ref 94–109)
CO2 SERPL-SCNC: 28 MMOL/L (ref 20–32)
CREAT SERPL-MCNC: 0.86 MG/DL (ref 0.52–1.04)
EOSINOPHIL # BLD AUTO: 0.1 10E3/UL (ref 0–0.7)
EOSINOPHIL NFR BLD AUTO: 2 %
ERYTHROCYTE [DISTWIDTH] IN BLOOD BY AUTOMATED COUNT: 12.5 % (ref 10–15)
GFR SERPL CREATININE-BSD FRML MDRD: 69 ML/MIN/1.73M2
GLUCOSE BLD-MCNC: 105 MG/DL (ref 70–99)
HCT VFR BLD AUTO: 36.4 % (ref 35–47)
HGB BLD-MCNC: 12.8 G/DL (ref 11.7–15.7)
IMM GRANULOCYTES # BLD: 0 10E3/UL
IMM GRANULOCYTES NFR BLD: 0 %
LYMPHOCYTES # BLD AUTO: 0.8 10E3/UL (ref 0.8–5.3)
LYMPHOCYTES NFR BLD AUTO: 11 %
MCH RBC QN AUTO: 31.6 PG (ref 26.5–33)
MCHC RBC AUTO-ENTMCNC: 35.2 G/DL (ref 31.5–36.5)
MCV RBC AUTO: 90 FL (ref 78–100)
MONOCYTES # BLD AUTO: 0.8 10E3/UL (ref 0–1.3)
MONOCYTES NFR BLD AUTO: 11 %
NEUTROPHILS # BLD AUTO: 5.5 10E3/UL (ref 1.6–8.3)
NEUTROPHILS NFR BLD AUTO: 76 %
NRBC # BLD AUTO: 0 10E3/UL
NRBC BLD AUTO-RTO: 0 /100
PLATELET # BLD AUTO: 223 10E3/UL (ref 150–450)
POTASSIUM BLD-SCNC: 4.1 MMOL/L (ref 3.4–5.3)
PROT SERPL-MCNC: 7.2 G/DL (ref 6.8–8.8)
RBC # BLD AUTO: 4.05 10E6/UL (ref 3.8–5.2)
SODIUM SERPL-SCNC: 144 MMOL/L (ref 133–144)
WBC # BLD AUTO: 7.3 10E3/UL (ref 4–11)

## 2022-07-13 PROCEDURE — 99214 OFFICE O/P EST MOD 30 MIN: CPT | Performed by: NURSE PRACTITIONER

## 2022-07-13 PROCEDURE — 36415 COLL VENOUS BLD VENIPUNCTURE: CPT

## 2022-07-13 PROCEDURE — 82248 BILIRUBIN DIRECT: CPT

## 2022-07-13 PROCEDURE — 85025 COMPLETE CBC W/AUTO DIFF WBC: CPT

## 2022-07-13 PROCEDURE — 80053 COMPREHEN METABOLIC PANEL: CPT

## 2022-07-13 RX ORDER — ANASTROZOLE 1 MG/1
1 TABLET ORAL DAILY
Qty: 90 TABLET | Refills: 3 | Status: SHIPPED | OUTPATIENT
Start: 2022-07-13 | End: 2023-09-01

## 2022-07-13 ASSESSMENT — PAIN SCALES - GENERAL: PAINLEVEL: NO PAIN (0)
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
## 2022-08-23 RX ORDER — SODIUM CHLORIDE 9 MG/ML
1000 INJECTION, SOLUTION INTRAVENOUS CONTINUOUS PRN
Status: CANCELLED
Start: 2022-08-29

## 2022-08-23 RX ORDER — METHYLPREDNISOLONE SODIUM SUCCINATE 125 MG/2ML
125 INJECTION, POWDER, LYOPHILIZED, FOR SOLUTION INTRAMUSCULAR; INTRAVENOUS
Status: CANCELLED
Start: 2022-08-29

## 2022-08-23 RX ORDER — ALBUTEROL SULFATE 90 UG/1
1-2 AEROSOL, METERED RESPIRATORY (INHALATION)
Status: CANCELLED
Start: 2022-08-29

## 2022-08-23 RX ORDER — EPINEPHRINE 0.3 MG/.3ML
0.3 INJECTION SUBCUTANEOUS EVERY 5 MIN PRN
Status: CANCELLED | OUTPATIENT
Start: 2022-08-29

## 2022-08-23 RX ORDER — MEPERIDINE HYDROCHLORIDE 25 MG/ML
25 INJECTION INTRAMUSCULAR; INTRAVENOUS; SUBCUTANEOUS EVERY 30 MIN PRN
Status: CANCELLED | OUTPATIENT
Start: 2022-08-29

## 2022-08-23 RX ORDER — EPINEPHRINE 1 MG/ML
0.3 INJECTION, SOLUTION INTRAMUSCULAR; SUBCUTANEOUS EVERY 5 MIN PRN
Status: CANCELLED | OUTPATIENT
Start: 2022-08-29

## 2022-08-23 RX ORDER — DIPHENHYDRAMINE HYDROCHLORIDE 50 MG/ML
50 INJECTION INTRAMUSCULAR; INTRAVENOUS
Status: CANCELLED
Start: 2022-08-29

## 2022-08-23 RX ORDER — ALBUTEROL SULFATE 0.83 MG/ML
2.5 SOLUTION RESPIRATORY (INHALATION)
Status: CANCELLED | OUTPATIENT
Start: 2022-08-29

## 2022-08-29 ENCOUNTER — LAB (OUTPATIENT)
Dept: LAB | Facility: CLINIC | Age: 77
End: 2022-08-29
Payer: MEDICARE

## 2022-08-29 ENCOUNTER — INFUSION THERAPY VISIT (OUTPATIENT)
Dept: INFUSION THERAPY | Facility: CLINIC | Age: 77
End: 2022-08-29
Payer: MEDICARE

## 2022-08-29 VITALS
TEMPERATURE: 97.5 F | OXYGEN SATURATION: 95 % | WEIGHT: 136.6 LBS | BODY MASS INDEX: 22.39 KG/M2 | HEART RATE: 69 BPM | SYSTOLIC BLOOD PRESSURE: 134 MMHG | DIASTOLIC BLOOD PRESSURE: 59 MMHG

## 2022-08-29 DIAGNOSIS — C50.912 INVASIVE DUCTAL CARCINOMA OF LEFT BREAST (H): ICD-10-CM

## 2022-08-29 DIAGNOSIS — M81.8 OTHER OSTEOPOROSIS WITHOUT CURRENT PATHOLOGICAL FRACTURE: Primary | ICD-10-CM

## 2022-08-29 DIAGNOSIS — Z79.811 AROMATASE INHIBITOR USE: ICD-10-CM

## 2022-08-29 LAB
CALCIUM SERPL-MCNC: 10 MG/DL (ref 8.5–10.1)
CREAT SERPL-MCNC: 0.88 MG/DL (ref 0.52–1.04)
GFR SERPL CREATININE-BSD FRML MDRD: 67 ML/MIN/1.73M2
HOLD SPECIMEN: NORMAL
HOLD SPECIMEN: NORMAL

## 2022-08-29 PROCEDURE — 82310 ASSAY OF CALCIUM: CPT | Performed by: NURSE PRACTITIONER

## 2022-08-29 PROCEDURE — 96372 THER/PROPH/DIAG INJ SC/IM: CPT | Performed by: NURSE PRACTITIONER

## 2022-08-29 PROCEDURE — 36415 COLL VENOUS BLD VENIPUNCTURE: CPT | Performed by: NURSE PRACTITIONER

## 2022-08-29 PROCEDURE — 82565 ASSAY OF CREATININE: CPT | Performed by: NURSE PRACTITIONER

## 2022-08-29 PROCEDURE — 99207 PR NO CHARGE LOS: CPT

## 2022-08-29 ASSESSMENT — PAIN SCALES - GENERAL: PAINLEVEL: NO PAIN (0)

## 2022-08-29 NOTE — PROGRESS NOTES
Infusion Nursing Note:  Radha PENNY Patel presents today for Prolia.    Patient seen by provider today: No   present during visit today: Not Applicable.    Note: Assessed by Taqueria RAMIREZ RN.    Intravenous Access:  No Intravenous access/labs at this visit.    Treatment Conditions:  Lab Results   Component Value Date     07/13/2022    POTASSIUM 4.1 07/13/2022    CR 0.88 08/29/2022    BERTIN 10.0 08/29/2022    BILITOTAL 0.4 07/13/2022    ALBUMIN 3.9 07/13/2022    ALT 20 07/13/2022    AST 11 07/13/2022       Post Infusion Assessment:  Patient tolerated injection without incident.     Discharge Plan:   Patient discharged in stable condition accompanied by: self.  Departure Mode: Ambulatory.      Jo Ann Curran LPN

## 2022-09-02 NOTE — PROGRESS NOTES
Karli is a 21 year old who is being evaluated via a billable telephone visit.      What phone number would you like to be contacted at? [phone]  How would you like to obtain your AVS? Nikhil    Assessment & Plan         ADHD (attention deficit hyperactivity disorder), inattentive type  .  Patient given postdated prescriptions for 3 months time. Follow-up in 6 months for medication recheck.  Continue non-medication behavioral modifications to improve attention and organization including having a regular schedule, breaking projects into smaller manageable time periods, work in quiet distraction free environment, achieve regular sleep pattern and healthy diet, remain physically active, as well as using lists, reminders, and calendars.    - amphetamine-dextroamphetamine (ADDERALL) 20 MG tablet; Take 1 tablet (20 mg) by mouth 2 times daily for 30 days  - amphetamine-dextroamphetamine (ADDERALL) 20 MG tablet; Take 1 tablet (20 mg) by mouth 2 times daily for 30 days  - amphetamine-dextroamphetamine (ADDERALL) 20 MG tablet; Take 1 tablet (20 mg) by mouth 2 times daily for 30 days    :[id number]}       Ramona Ann Aaseby-Aguilera, PA-C  [facility]    Laila Calvillo is a 21 year old presenting for the following health issues:  No chief complaint on file.    ADHD: Taking  adderall 20 mg bid.   Working well at this dosage.  Denies tics, palpitations, insomnia or trouble with appetite  HPI     Medication Followup of Adderall 20MG    Taking Medication as prescribed: yes    Side Effects:  None    Medication Helping Symptoms:  yes        Review of Systems   Constitutional, HEENT, cardiovascular, pulmonary, gi and gu systems are negative, except as otherwise noted.      Objective           Vitals:  No vitals were obtained today due to virtual visit.    Physical Exam   healthy, alert and no distress  PSYCH: Alert and oriented times 3; coherent speech, normal   rate and volume, able to articulate logical  No change in History and Physical since the last visit.  I went over the planned procedure in detail today.  All risks, benefits and alternatives were explained in detail again.  All questions were answered.  The patient understood the plan and all that was discussed and wants to proceed with the planned procedure today.  The patient understands the team approach to care in the operating room and agrees to the procedure as such.  The patient has been cleared by medicine for this procedure and all laboratory tests are stable.   thoughts, able   to abstract reason, no tangential thoughts, no hallucinations   or delusions  Her affect is normal  RESP: No cough, no audible wheezing, able to talk in full sentences  Remainder of exam unable to be completed due to telephone visits            Phone call duration: 10 minutes    [unfilled]  @[facility]@

## 2022-09-03 ENCOUNTER — HEALTH MAINTENANCE LETTER (OUTPATIENT)
Age: 77
End: 2022-09-03

## 2022-11-14 ENCOUNTER — OFFICE VISIT (OUTPATIENT)
Dept: PULMONOLOGY | Facility: CLINIC | Age: 77
End: 2022-11-14
Payer: MEDICARE

## 2022-11-14 VITALS
HEIGHT: 66 IN | DIASTOLIC BLOOD PRESSURE: 58 MMHG | BODY MASS INDEX: 21.53 KG/M2 | WEIGHT: 134 LBS | RESPIRATION RATE: 14 BRPM | SYSTOLIC BLOOD PRESSURE: 112 MMHG | OXYGEN SATURATION: 96 % | HEART RATE: 75 BPM

## 2022-11-14 DIAGNOSIS — J43.2 CENTRILOBULAR EMPHYSEMA (H): ICD-10-CM

## 2022-11-14 PROCEDURE — 99215 OFFICE O/P EST HI 40 MIN: CPT | Performed by: INTERNAL MEDICINE

## 2022-11-14 RX ORDER — ALBUTEROL SULFATE 90 UG/1
2 AEROSOL, METERED RESPIRATORY (INHALATION) EVERY 6 HOURS PRN
Qty: 8 G | Refills: 11 | Status: CANCELLED | OUTPATIENT
Start: 2022-11-14

## 2022-11-14 RX ORDER — ALBUTEROL SULFATE 90 UG/1
2 AEROSOL, METERED RESPIRATORY (INHALATION) EVERY 6 HOURS PRN
Qty: 8 G | Refills: 11 | Status: SHIPPED | OUTPATIENT
Start: 2022-11-14 | End: 2024-06-21

## 2022-11-14 ASSESSMENT — PAIN SCALES - GENERAL: PAINLEVEL: NO PAIN (0)

## 2022-11-14 NOTE — PROGRESS NOTES
"Radha Patel's goals for this visit include: Return  She requests these members of her care team be copied on today's visit information: PCP    [facility]    Referring Provider:  No referring provider defined for this encounter.    /58   Pulse 75   Resp 14   Ht 1.664 m (5' 5.51\")   Wt 60.8 kg (134 lb)   SpO2 96%   BMI 21.95 kg/m      Do you need any medication refills at today's visit? N      Ramiro Syed LPN  Pulmonary Medicine:  [facility]  Phone: [phone] Fax: [phone]      "
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
## 2023-01-30 ENCOUNTER — ONCOLOGY VISIT (OUTPATIENT)
Dept: ONCOLOGY | Facility: CLINIC | Age: 78
End: 2023-01-30
Payer: MEDICARE

## 2023-01-30 VITALS
SYSTOLIC BLOOD PRESSURE: 137 MMHG | WEIGHT: 133 LBS | DIASTOLIC BLOOD PRESSURE: 71 MMHG | OXYGEN SATURATION: 96 % | HEIGHT: 66 IN | HEART RATE: 68 BPM | BODY MASS INDEX: 21.38 KG/M2

## 2023-01-30 DIAGNOSIS — Z86.73 HISTORY OF STROKE: ICD-10-CM

## 2023-01-30 DIAGNOSIS — E28.39 ESTROGEN DEFICIENCY: ICD-10-CM

## 2023-01-30 DIAGNOSIS — M25.472 ANKLE EDEMA, BILATERAL: ICD-10-CM

## 2023-01-30 DIAGNOSIS — C50.912 INVASIVE DUCTAL CARCINOMA OF LEFT BREAST (H): Primary | ICD-10-CM

## 2023-01-30 DIAGNOSIS — Z79.811 AROMATASE INHIBITOR USE: ICD-10-CM

## 2023-01-30 DIAGNOSIS — Z12.31 VISIT FOR SCREENING MAMMOGRAM: ICD-10-CM

## 2023-01-30 DIAGNOSIS — M81.8 OTHER OSTEOPOROSIS WITHOUT CURRENT PATHOLOGICAL FRACTURE: ICD-10-CM

## 2023-01-30 DIAGNOSIS — M25.471 ANKLE EDEMA, BILATERAL: ICD-10-CM

## 2023-01-30 DIAGNOSIS — R25.2 LEG CRAMPS: ICD-10-CM

## 2023-01-30 PROCEDURE — 99215 OFFICE O/P EST HI 40 MIN: CPT | Performed by: NURSE PRACTITIONER

## 2023-01-30 ASSESSMENT — PAIN SCALES - GENERAL: PAINLEVEL: NO PAIN (0)

## 2023-01-30 NOTE — NURSING NOTE
"Oncology Rooming Note    January 30, 2023 9:46 AM   Radha Patel is a 77 year old female who presents for:    Chief Complaint   Patient presents with     Oncology Clinic Visit     Follow up     Initial Vitals: /71 (BP Location: Right arm, Patient Position: Chair, Cuff Size: Adult Regular)   Pulse 68   Ht 1.664 m (5' 5.5\")   Wt 60.3 kg (133 lb)   SpO2 96%   BMI 21.80 kg/m   Estimated body mass index is 21.8 kg/m  as calculated from the following:    Height as of this encounter: 1.664 m (5' 5.5\").    Weight as of this encounter: 60.3 kg (133 lb). Body surface area is 1.67 meters squared.  No Pain (0) Comment: Data Unavailable   No LMP recorded. Patient has had a hysterectomy.  Allergies reviewed: Yes  Medications reviewed: Yes    Medications: Medication refills not needed today.  Pharmacy name entered into [employer]: [id number] [place], [place] - [address] [place]    Clinical concerns: NO Saumya was notified.      Leanne Suárez CMA              "
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
## 2023-02-03 ENCOUNTER — PATIENT OUTREACH (OUTPATIENT)
Dept: ONCOLOGY | Facility: CLINIC | Age: 78
End: 2023-02-03
Payer: MEDICARE

## 2023-02-03 NOTE — PROGRESS NOTES
Breast Cancer Index testing submitted to [employer] via fax.   Pathology report case # [id number] collected on 12/21/2017 at [facility], face sheet, copy of insurance cards and recent oncology visit note faxed to [phone].

## 2023-02-23 ENCOUNTER — PATIENT OUTREACH (OUTPATIENT)
Dept: ONCOLOGY | Facility: CLINIC | Age: 78
End: 2023-02-23
Payer: MEDICARE
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
## 2023-02-23 NOTE — PROGRESS NOTES
Telephone call with patient to discuss results of the breast cancer index test.  Shared that the predictive test suggested there is no benefit to extending endocrine therapy.  Would still like to see her in June as she completes her 5 years of use for survivorship visit and she should continue to use the tamoxifen until that time.  This will also coordinate with her next mammogram. Saumya Greco,CNP

## 2023-04-28 ENCOUNTER — ANCILLARY PROCEDURE (OUTPATIENT)
Dept: MAMMOGRAPHY | Facility: CLINIC | Age: 78
End: 2023-04-28
Attending: NURSE PRACTITIONER
Payer: MEDICARE

## 2023-04-28 DIAGNOSIS — Z12.31 VISIT FOR SCREENING MAMMOGRAM: ICD-10-CM

## 2023-04-28 PROCEDURE — 77063 BREAST TOMOSYNTHESIS BI: CPT | Mod: GC | Performed by: RADIOLOGY

## 2023-04-28 PROCEDURE — 77067 SCR MAMMO BI INCL CAD: CPT | Mod: GC | Performed by: RADIOLOGY

## 2023-04-29 ENCOUNTER — HEALTH MAINTENANCE LETTER (OUTPATIENT)
Age: 78
End: 2023-04-29

## 2023-07-31 ENCOUNTER — DOCUMENTATION ONLY (OUTPATIENT)
Dept: LAB | Facility: CLINIC | Age: 78
End: 2023-07-31

## 2023-07-31 DIAGNOSIS — C50.912 INVASIVE DUCTAL CARCINOMA OF LEFT BREAST (H): Primary | ICD-10-CM

## 2023-07-31 NOTE — PROGRESS NOTES
Radha Patel has an upcoming lab appointment:    Future Appointments   Date Time Provider Department Center   8/1/2023 10:15 AM LAB ONC [facility] [place]   8/1/2023 10:45 AM Saumya Barker APRN CNP [facility]   8/1/2023 11:30 AM [facility] [place]     Patient is scheduled for labs there is no order available. Please review and place either future orders or HMPO (Review of Health Maintenance Protocol Orders), as appropriate.    Health Maintenance Due   Topic    ANNUAL REVIEW OF HM ORDERS     HEPATITIS C SCREENING     LIPID      Dorita ZAIDIT
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
## 2023-08-01 ENCOUNTER — LAB (OUTPATIENT)
Dept: LAB | Facility: CLINIC | Age: 78
End: 2023-08-01
Payer: MEDICARE

## 2023-08-01 ENCOUNTER — INFUSION THERAPY VISIT (OUTPATIENT)
Dept: INFUSION THERAPY | Facility: CLINIC | Age: 78
End: 2023-08-01
Payer: MEDICARE

## 2023-08-01 ENCOUNTER — ONCOLOGY VISIT (OUTPATIENT)
Dept: ONCOLOGY | Facility: CLINIC | Age: 78
End: 2023-08-01
Attending: NURSE PRACTITIONER
Payer: MEDICARE

## 2023-08-01 VITALS
WEIGHT: 131.9 LBS | SYSTOLIC BLOOD PRESSURE: 126 MMHG | DIASTOLIC BLOOD PRESSURE: 61 MMHG | BODY MASS INDEX: 21.2 KG/M2 | HEIGHT: 66 IN | OXYGEN SATURATION: 97 % | HEART RATE: 60 BPM

## 2023-08-01 DIAGNOSIS — M81.8 OTHER OSTEOPOROSIS WITHOUT CURRENT PATHOLOGICAL FRACTURE: ICD-10-CM

## 2023-08-01 DIAGNOSIS — R25.2 LEG CRAMPS: ICD-10-CM

## 2023-08-01 DIAGNOSIS — E28.39 ESTROGEN DEFICIENCY: ICD-10-CM

## 2023-08-01 DIAGNOSIS — C50.912 INVASIVE DUCTAL CARCINOMA OF LEFT BREAST (H): ICD-10-CM

## 2023-08-01 DIAGNOSIS — Z12.31 VISIT FOR SCREENING MAMMOGRAM: ICD-10-CM

## 2023-08-01 DIAGNOSIS — Z79.811 AROMATASE INHIBITOR USE: ICD-10-CM

## 2023-08-01 DIAGNOSIS — R74.8 ELEVATED ALKALINE PHOSPHATASE LEVEL: ICD-10-CM

## 2023-08-01 DIAGNOSIS — M81.8 OTHER OSTEOPOROSIS WITHOUT CURRENT PATHOLOGICAL FRACTURE: Primary | ICD-10-CM

## 2023-08-01 DIAGNOSIS — R74.8 ELEVATED ALKALINE PHOSPHATASE LEVEL: Primary | ICD-10-CM

## 2023-08-01 DIAGNOSIS — Z86.73 HISTORY OF STROKE: ICD-10-CM

## 2023-08-01 LAB
ALBUMIN SERPL BCG-MCNC: 4.2 G/DL (ref 3.5–5.2)
ALP SERPL-CCNC: 166 U/L (ref 35–104)
ALT SERPL W P-5'-P-CCNC: 16 U/L (ref 0–50)
AST SERPL W P-5'-P-CCNC: 17 U/L (ref 0–45)
BASOPHILS # BLD AUTO: 0 10E3/UL (ref 0–0.2)
BASOPHILS NFR BLD AUTO: 0 %
BILIRUB DIRECT SERPL-MCNC: <0.2 MG/DL (ref 0–0.3)
BILIRUB SERPL-MCNC: 0.4 MG/DL
EOSINOPHIL # BLD AUTO: 0.2 10E3/UL (ref 0–0.7)
EOSINOPHIL NFR BLD AUTO: 2 %
ERYTHROCYTE [DISTWIDTH] IN BLOOD BY AUTOMATED COUNT: 12.4 % (ref 10–15)
HCT VFR BLD AUTO: 36.1 % (ref 35–47)
HGB BLD-MCNC: 12.4 G/DL (ref 11.7–15.7)
HOLD SPECIMEN: NORMAL
IMM GRANULOCYTES # BLD: 0 10E3/UL
IMM GRANULOCYTES NFR BLD: 0 %
LYMPHOCYTES # BLD AUTO: 0.8 10E3/UL (ref 0.8–5.3)
LYMPHOCYTES NFR BLD AUTO: 13 %
MCH RBC QN AUTO: 31.1 PG (ref 26.5–33)
MCHC RBC AUTO-ENTMCNC: 34.3 G/DL (ref 31.5–36.5)
MCV RBC AUTO: 91 FL (ref 78–100)
MONOCYTES # BLD AUTO: 0.5 10E3/UL (ref 0–1.3)
MONOCYTES NFR BLD AUTO: 8 %
NEUTROPHILS # BLD AUTO: 4.7 10E3/UL (ref 1.6–8.3)
NEUTROPHILS NFR BLD AUTO: 77 %
NRBC # BLD AUTO: 0 10E3/UL
NRBC BLD AUTO-RTO: 0 /100
PLATELET # BLD AUTO: 212 10E3/UL (ref 150–450)
PROT SERPL-MCNC: 6.5 G/DL (ref 6.4–8.3)
RBC # BLD AUTO: 3.99 10E6/UL (ref 3.8–5.2)
WBC # BLD AUTO: 6.2 10E3/UL (ref 4–11)

## 2023-08-01 PROCEDURE — 36415 COLL VENOUS BLD VENIPUNCTURE: CPT

## 2023-08-01 PROCEDURE — 80076 HEPATIC FUNCTION PANEL: CPT

## 2023-08-01 PROCEDURE — 85025 COMPLETE CBC W/AUTO DIFF WBC: CPT

## 2023-08-01 PROCEDURE — 99215 OFFICE O/P EST HI 40 MIN: CPT | Mod: 25 | Performed by: NURSE PRACTITIONER

## 2023-08-01 PROCEDURE — 96372 THER/PROPH/DIAG INJ SC/IM: CPT | Performed by: NURSE PRACTITIONER

## 2023-08-01 PROCEDURE — 82977 ASSAY OF GGT: CPT

## 2023-08-01 RX ORDER — MEPERIDINE HYDROCHLORIDE 25 MG/ML
25 INJECTION INTRAMUSCULAR; INTRAVENOUS; SUBCUTANEOUS EVERY 30 MIN PRN
Status: CANCELLED | OUTPATIENT
Start: 2023-08-01

## 2023-08-01 RX ORDER — ALBUTEROL SULFATE 0.83 MG/ML
2.5 SOLUTION RESPIRATORY (INHALATION)
Status: CANCELLED | OUTPATIENT
Start: 2023-08-01

## 2023-08-01 RX ORDER — EPINEPHRINE 0.3 MG/.3ML
0.3 INJECTION SUBCUTANEOUS EVERY 5 MIN PRN
Status: CANCELLED | OUTPATIENT
Start: 2023-08-01

## 2023-08-01 RX ORDER — SODIUM CHLORIDE 9 MG/ML
1000 INJECTION, SOLUTION INTRAVENOUS CONTINUOUS PRN
Status: CANCELLED
Start: 2023-08-01

## 2023-08-01 RX ORDER — ALBUTEROL SULFATE 90 UG/1
1-2 AEROSOL, METERED RESPIRATORY (INHALATION)
Status: CANCELLED
Start: 2023-08-01

## 2023-08-01 RX ORDER — METHYLPREDNISOLONE SODIUM SUCCINATE 125 MG/2ML
125 INJECTION, POWDER, LYOPHILIZED, FOR SOLUTION INTRAMUSCULAR; INTRAVENOUS
Status: CANCELLED
Start: 2023-08-01

## 2023-08-01 RX ORDER — DIPHENHYDRAMINE HYDROCHLORIDE 50 MG/ML
50 INJECTION INTRAMUSCULAR; INTRAVENOUS
Status: CANCELLED
Start: 2023-08-01

## 2023-08-01 RX ORDER — EPINEPHRINE 1 MG/ML
0.3 INJECTION, SOLUTION INTRAMUSCULAR; SUBCUTANEOUS EVERY 5 MIN PRN
Status: CANCELLED | OUTPATIENT
Start: 2023-08-01

## 2023-08-01 ASSESSMENT — PAIN SCALES - GENERAL: PAINLEVEL: NO PAIN (0)

## 2023-08-01 NOTE — NURSING NOTE
"Oncology Rooming Note    August 1, 2023 10:35 AM   Radha Patel is a 77 year old female who presents for:    Chief Complaint   Patient presents with    Oncology Clinic Visit     Follow up     Initial Vitals: /61 (BP Location: Right arm, Patient Position: Chair, Cuff Size: Adult Regular)   Pulse 60   Ht 1.664 m (5' 5.5\")   Wt 59.8 kg (131 lb 14.4 oz)   SpO2 97%   BMI 21.62 kg/m   Estimated body mass index is 21.62 kg/m  as calculated from the following:    Height as of this encounter: 1.664 m (5' 5.5\").    Weight as of this encounter: 59.8 kg (131 lb 14.4 oz). Body surface area is 1.66 meters squared.  No Pain (0) Comment: Data Unavailable   No LMP recorded. Patient has had a hysterectomy.  Allergies reviewed: Yes  Medications reviewed: Yes    Medications: Medication refills not needed today.  Pharmacy name entered into [facility]: [id number] - [place], [place] - [address] [place]    Clinical concerns: NO  Saumya was notified.      Leanne Suárez CMA              "
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
## 2023-08-01 NOTE — PROGRESS NOTES
Infusion Nursing Note:  Radha Patel presents today for   Chief Complaint   Patient presents with    Allied Health Visit     Prolia     .    Patient seen by provider today: Yes: juan daniel Barker CNP   present during visit today: Not Applicable.    Note: N/A.      Treatment Conditions:  Lab Results   Component Value Date     07/13/2022    POTASSIUM 4.1 07/13/2022    CR 0.88 08/29/2022    BERTIN 10.0 08/29/2022    BILITOTAL 0.4 08/01/2023    ALBUMIN 4.2 08/01/2023    ALT 16 08/01/2023    AST 17 08/01/2023       Results reviewed, labs MET treatment parameters, ok to proceed with treatment.      Post Infusion Assessment:  Patient tolerated injection without incident.  Site patent and intact, free from redness, edema or discomfort.       Discharge Plan:   Patient discharged in stable condition accompanied by: self.  Departure Mode: Ambulatory.      Leanne Suárez CMA

## 2023-08-02 ENCOUNTER — PATIENT OUTREACH (OUTPATIENT)
Dept: ONCOLOGY | Facility: CLINIC | Age: 78
End: 2023-08-02
Payer: MEDICARE

## 2023-08-02 NOTE — PROGRESS NOTES
[facility]:  Cancer Care Note    Received request from provider Saumya Braker CNP, to assist with submitting an order for Breast Cancer Index (BCI) testing for this patient.  However, upon review of patient's chart, appears that BCI testing was already completed in February 2023.  Copy of results report was printed and left on Saumya's desk for review.  Saumya was also notified via staff message.    Taqueria Funez, RN, BSN, OCN  RN Care Coordinator - Oncology  [facility]

## 2023-08-03 LAB — GGT SERPL-CCNC: 20 U/L (ref 5–36)

## 2023-09-01 DIAGNOSIS — C50.912 INVASIVE DUCTAL CARCINOMA OF LEFT BREAST (H): ICD-10-CM

## 2023-09-01 DIAGNOSIS — Z79.811 AROMATASE INHIBITOR USE: ICD-10-CM

## 2023-09-01 DIAGNOSIS — M81.8 OTHER OSTEOPOROSIS WITHOUT CURRENT PATHOLOGICAL FRACTURE: ICD-10-CM

## 2023-09-01 RX ORDER — ANASTROZOLE 1 MG/1
1 TABLET ORAL DAILY
Qty: 90 TABLET | Refills: 0 | Status: SHIPPED | OUTPATIENT
Start: 2023-09-01 | End: 2023-09-14

## 2023-09-01 NOTE — TELEPHONE ENCOUNTER
SUBJECTIVE/OBJECTIVE:                                                    Refill request receive for:  anastrozole (ARIMIDEX) 1 MG tablet     Last refill: 7/13/2022  Date of LOV r/t Medication: 8/1/2023  Future appt scheduled? Yes: 2/1/2024     RECENT LABS/VITALS                                                      No results for input(s): CHOL, HDL, LDL, TRIG, CHOLHDLRATIO in the last 33349 hours.  Lab Results   Component Value Date    AST 17 08/01/2023    AST 13 02/26/2021     Lab Results   Component Value Date    ALT 16 08/01/2023    ALT 22 02/26/2021     No results found for: A1C  Creatinine   Date Value Ref Range Status   08/29/2022 0.88 0.52 - 1.04 mg/dL Final   02/26/2021 0.95 0.52 - 1.04 mg/dL Final   ]  Potassium   Date Value Ref Range Status   07/13/2022 4.1 3.4 - 5.3 mmol/L Final   02/26/2021 4.1 3.4 - 5.3 mmol/L Final   ]  No results found for: TSH  BP Readings from Last 4 Encounters:   08/01/23 126/61   01/30/23 137/71   11/14/22 112/58   08/29/22 134/59       PLAN:                                                      Sent to provider to advise.    Cami Vincent RN

## 2023-09-04 DIAGNOSIS — M81.8 OTHER OSTEOPOROSIS WITHOUT CURRENT PATHOLOGICAL FRACTURE: ICD-10-CM

## 2023-09-04 DIAGNOSIS — Z79.811 AROMATASE INHIBITOR USE: ICD-10-CM

## 2023-09-04 DIAGNOSIS — C50.912 INVASIVE DUCTAL CARCINOMA OF LEFT BREAST (H): ICD-10-CM

## 2023-09-05 DIAGNOSIS — R74.8 ALKALINE PHOSPHATASE ELEVATION: Primary | ICD-10-CM

## 2023-09-05 DIAGNOSIS — C50.912 INVASIVE DUCTAL CARCINOMA OF LEFT BREAST (H): ICD-10-CM

## 2023-09-06 RX ORDER — ANASTROZOLE 1 MG/1
1 TABLET ORAL DAILY
Qty: 90 TABLET | Refills: 0 | OUTPATIENT
Start: 2023-09-06

## 2023-09-10 DIAGNOSIS — C50.912 INVASIVE DUCTAL CARCINOMA OF LEFT BREAST (H): ICD-10-CM

## 2023-09-10 DIAGNOSIS — Z79.811 AROMATASE INHIBITOR USE: ICD-10-CM

## 2023-09-10 DIAGNOSIS — M81.8 OTHER OSTEOPOROSIS WITHOUT CURRENT PATHOLOGICAL FRACTURE: ICD-10-CM

## 2023-09-11 RX ORDER — ANASTROZOLE 1 MG/1
1 TABLET ORAL DAILY
Qty: 90 TABLET | Refills: 0 | OUTPATIENT
Start: 2023-09-11

## 2023-09-14 ENCOUNTER — TELEPHONE (OUTPATIENT)
Dept: ONCOLOGY | Facility: CLINIC | Age: 78
End: 2023-09-14
Payer: MEDICARE

## 2023-09-14 DIAGNOSIS — Z79.811 AROMATASE INHIBITOR USE: ICD-10-CM

## 2023-09-14 DIAGNOSIS — C50.912 INVASIVE DUCTAL CARCINOMA OF LEFT BREAST (H): ICD-10-CM

## 2023-09-14 DIAGNOSIS — M81.8 OTHER OSTEOPOROSIS WITHOUT CURRENT PATHOLOGICAL FRACTURE: ICD-10-CM

## 2023-09-14 RX ORDER — ANASTROZOLE 1 MG/1
1 TABLET ORAL DAILY
Qty: 90 TABLET | Refills: 0 | OUTPATIENT
Start: 2023-09-14

## 2023-09-14 NOTE — TELEPHONE ENCOUNTER
On third attempt to contact patient was able to leave a message that we have requested a new lab visit for any related to her elevated alk phos level with her August visit.  Asked her to make that appointment and get the lab result and if everything is fine we will let her know that otherwise we will follow-up with the lab results.  Also shared that her pharmacy continues to ask repeatedly for a refill of her anastrozole which we discontinued with last visit and made her aware to let the pharmacy know that she will not be picking up any further anastrozole since her 5 years has been completed and BCI testing shows no advantage to continuation of plan.  Saumya Barker,Cnp

## 2024-01-24 ENCOUNTER — ANCILLARY PROCEDURE (OUTPATIENT)
Dept: BONE DENSITY | Facility: CLINIC | Age: 79
End: 2024-01-24
Attending: NURSE PRACTITIONER
Payer: MEDICARE

## 2024-01-24 DIAGNOSIS — Z86.73 HISTORY OF STROKE: ICD-10-CM

## 2024-01-24 DIAGNOSIS — E28.39 ESTROGEN DEFICIENCY: ICD-10-CM

## 2024-01-24 DIAGNOSIS — Z79.811 AROMATASE INHIBITOR USE: ICD-10-CM

## 2024-01-24 DIAGNOSIS — C50.912 INVASIVE DUCTAL CARCINOMA OF LEFT BREAST (H): ICD-10-CM

## 2024-01-24 DIAGNOSIS — M81.8 OTHER OSTEOPOROSIS WITHOUT CURRENT PATHOLOGICAL FRACTURE: ICD-10-CM

## 2024-01-24 PROCEDURE — 77080 DXA BONE DENSITY AXIAL: CPT | Performed by: RADIOLOGY
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
## 2024-02-13 ENCOUNTER — ONCOLOGY SURVIVORSHIP VISIT (OUTPATIENT)
Dept: ONCOLOGY | Facility: CLINIC | Age: 79
End: 2024-02-13
Attending: NURSE PRACTITIONER
Payer: MEDICARE

## 2024-02-13 VITALS
OXYGEN SATURATION: 96 % | RESPIRATION RATE: 16 BRPM | BODY MASS INDEX: 21.52 KG/M2 | HEART RATE: 67 BPM | DIASTOLIC BLOOD PRESSURE: 57 MMHG | WEIGHT: 129.2 LBS | SYSTOLIC BLOOD PRESSURE: 145 MMHG | HEIGHT: 65 IN

## 2024-02-13 DIAGNOSIS — E28.39 ESTROGEN DEFICIENCY: ICD-10-CM

## 2024-02-13 DIAGNOSIS — Z86.73 HISTORY OF STROKE: ICD-10-CM

## 2024-02-13 DIAGNOSIS — Z12.31 ENCOUNTER FOR SCREENING MAMMOGRAM FOR BREAST CANCER: Primary | ICD-10-CM

## 2024-02-13 DIAGNOSIS — C50.912 INVASIVE DUCTAL CARCINOMA OF LEFT BREAST (H): ICD-10-CM

## 2024-02-13 DIAGNOSIS — M81.8 OTHER OSTEOPOROSIS WITHOUT CURRENT PATHOLOGICAL FRACTURE: ICD-10-CM

## 2024-02-13 DIAGNOSIS — R25.2 LEG CRAMPS: ICD-10-CM

## 2024-02-13 DIAGNOSIS — Z79.811 AROMATASE INHIBITOR USE: ICD-10-CM

## 2024-02-13 PROCEDURE — 99215 OFFICE O/P EST HI 40 MIN: CPT | Performed by: NURSE PRACTITIONER

## 2024-02-13 PROCEDURE — G0463 HOSPITAL OUTPT CLINIC VISIT: HCPCS | Performed by: NURSE PRACTITIONER

## 2024-02-13 ASSESSMENT — PAIN SCALES - GENERAL: PAINLEVEL: NO PAIN (0)

## 2024-02-13 NOTE — NURSING NOTE
"Oncology Rooming Note    February 13, 2024 3:03 PM   Radha Patel is a 78 year old female who presents for:    Chief Complaint   Patient presents with    Oncology Clinic Visit     Follow Up     Initial Vitals: BP (!) 145/57   Pulse 67   Resp 16   Ht 1.651 m (5' 5\")   Wt 58.6 kg (129 lb 3.2 oz)   SpO2 96%   BMI 21.50 kg/m   Estimated body mass index is 21.5 kg/m  as calculated from the following:    Height as of this encounter: 1.651 m (5' 5\").    Weight as of this encounter: 58.6 kg (129 lb 3.2 oz). Body surface area is 1.64 meters squared.  No Pain (0) Comment: Data Unavailable   No LMP recorded. Patient has had a hysterectomy.  Allergies reviewed: Yes  Medications reviewed: Yes    Medications: Medication refills not needed today.  Pharmacy name entered into [facility]: [id number] - [place], [place] [facility] [id number]    Frailty Screening:   Is the patient here for a new oncology consult visit in cancer care? 2. No      Clinical concerns: No Concerns       Ivy Pang MA              "

## 2024-06-20 DIAGNOSIS — J43.2 CENTRILOBULAR EMPHYSEMA (H): ICD-10-CM

## 2024-06-21 RX ORDER — ALBUTEROL SULFATE 90 UG/1
2 AEROSOL, METERED RESPIRATORY (INHALATION) EVERY 6 HOURS PRN
Qty: 18 G | Refills: 0 | Status: SHIPPED | OUTPATIENT
Start: 2024-06-21

## 2024-06-21 NOTE — TELEPHONE ENCOUNTER
albuterol (PROAIR HFA/PROVENTIL HFA/VENTOLIN HFA) 108 (90 Base) MCG/ACT inhaler 8 g 11 11/14/2022 -- No   Sig - Route: Inhale 2 puffs into the lungs every 6 hours as needed for shortness of breath / dyspnea or wheezing - Inhalation           Last Office Visit: 11/14/22  Future Office visit:       Routing refill request to provider for review/approval because:  Did not pass protocol.

## 2024-07-06 ENCOUNTER — HEALTH MAINTENANCE LETTER (OUTPATIENT)
Age: 79
End: 2024-07-06

## 2024-10-24 DIAGNOSIS — J43.2 CENTRILOBULAR EMPHYSEMA (H): ICD-10-CM

## 2024-10-24 RX ORDER — ALBUTEROL SULFATE 90 UG/1
2 INHALANT RESPIRATORY (INHALATION) EVERY 6 HOURS PRN
Qty: 18 G | Refills: 0 | Status: SHIPPED | OUTPATIENT
Start: 2024-10-24

## 2024-10-24 NOTE — TELEPHONE ENCOUNTER
albuterol (PROAIR HFA/PROVENTIL HFA/VENTOLIN HFA) 108 (90 Base) MCG/ACT inhaler Inhale 2 puffs into the lungs every 6 hours as needed for shortness of breath / dyspnea or wheezing 18 g 0 ordered 06/21/2024     Last Office Visit: 10/14/2022  Future Office visit:  11/06/2024    Routing refill request to provider for review/approval because:  Needs follow up apt    Ramiro Syed LPN  Pulmonary Medicine:  [facility]  Phone: [phone] Fax: [phone]

## 2025-02-17 DIAGNOSIS — J43.2 CENTRILOBULAR EMPHYSEMA (H): ICD-10-CM

## 2025-02-17 RX ORDER — ALBUTEROL SULFATE 90 UG/1
INHALANT RESPIRATORY (INHALATION)
Qty: 18 G | Refills: 0 | Status: SHIPPED | OUTPATIENT
Start: 2025-02-17

## 2025-02-17 NOTE — TELEPHONE ENCOUNTER
albuterol (PROAIR HFA/PROVENTIL HFA/VENTOLIN HFA) 108 (90 Base) MCG/ACT inhaler Inhale 2 puffs into the lungs every 6 hours as needed for shortness of breath / dyspnea or wheezing 18 g 0 ordered 10/24/2024 -- -- Will send refills at next appointment 11/06/2024. Pharmacy may dispense brand covered by insurance (Proair, or proventil or ventolin or generic albuterol inhaler)      Last office visit: Visit date not found ; last virtual visit: Visit date not found with prescribing provider:     Future Office Visit:  3/19/25    Rick Banuelos RN on 2/17/2025 at 9:01 AM

## 2025-06-04 DIAGNOSIS — J43.2 CENTRILOBULAR EMPHYSEMA (H): ICD-10-CM

## 2025-06-04 RX ORDER — ALBUTEROL SULFATE 90 UG/1
INHALANT RESPIRATORY (INHALATION)
Qty: 18 G | Refills: 0 | Status: SHIPPED | OUTPATIENT
Start: 2025-06-04

## 2025-07-13 ENCOUNTER — HEALTH MAINTENANCE LETTER (OUTPATIENT)
Age: 80
End: 2025-07-13

## (undated) DEVICE — DRSG XEROFORM 1X8"

## (undated) DEVICE — GLOVE PROTEXIS BLUE W/NEU-THERA 7.0  2D73EB70

## (undated) DEVICE — PREP CHLORAPREP CLEAR 3ML 260400

## (undated) DEVICE — GLOVE PROTEXIS W/NEU-THERA 7.0  2D73TE70

## (undated) DEVICE — DRAPE STERI TOWEL SM 1000

## (undated) DEVICE — BNDG ELASTIC 4"X5YDS UNSTERILE 6611-40

## (undated) DEVICE — PREP CHLORAPREP 26ML TINTED ORANGE  260815

## (undated) DEVICE — NDL 19GA 1.5"

## (undated) DEVICE — PACK HAND WRIST SOP15HWFSP

## (undated) DEVICE — GLOVE PROTEXIS W/NEU-THERA 8.0  2D73TE80